# Patient Record
Sex: FEMALE | Race: WHITE | NOT HISPANIC OR LATINO | Employment: OTHER | URBAN - METROPOLITAN AREA
[De-identification: names, ages, dates, MRNs, and addresses within clinical notes are randomized per-mention and may not be internally consistent; named-entity substitution may affect disease eponyms.]

---

## 2017-01-18 PROBLEM — D23.0 OTHER BENIGN NEOPLASM OF SKIN OF LIP: Status: ACTIVE | Noted: 2017-01-18

## 2017-01-18 PROBLEM — D49.2 NEOPLASM OF UNSPECIFIED BEHAVIOR OF BONE, SOFT TISSUE, AND SKIN: Status: ACTIVE | Noted: 2017-01-18

## 2017-05-23 ENCOUNTER — PATIENT OUTREACH (OUTPATIENT)
Dept: HEALTH INFORMATION MANAGEMENT | Facility: OTHER | Age: 77
End: 2017-05-23

## 2017-05-23 NOTE — PROGRESS NOTES
Outcome: REQUESTED A CALL BACK -- PATIENT DID NOT HAVE HER CALENDER HANDY AND WILL CALL BACK. OK WITH US CALLING BACK AT A LATER TIME AS WELL. I ALSO GAVE HER A LIST OF PROVIDERS THAT ARE CURRENTLY ACCEPTING PATIENTS IN [facility] AND [facility].    WebIZ Checked & Epic Updated:  yes    HealthConnect Verified: no    Attempt # 1ST

## 2017-06-13 ENCOUNTER — HOSPITAL ENCOUNTER (EMERGENCY)
Facility: MEDICAL CENTER | Age: 77
End: 2017-06-13
Attending: EMERGENCY MEDICINE
Payer: MEDICARE

## 2017-06-13 ENCOUNTER — APPOINTMENT (OUTPATIENT)
Dept: RADIOLOGY | Facility: MEDICAL CENTER | Age: 77
End: 2017-06-13
Attending: EMERGENCY MEDICINE
Payer: MEDICARE

## 2017-06-13 VITALS
HEART RATE: 57 BPM | BODY MASS INDEX: 19.99 KG/M2 | RESPIRATION RATE: 16 BRPM | WEIGHT: 117.06 LBS | TEMPERATURE: 97.9 F | HEIGHT: 64 IN | DIASTOLIC BLOOD PRESSURE: 66 MMHG | OXYGEN SATURATION: 97 % | SYSTOLIC BLOOD PRESSURE: 151 MMHG

## 2017-06-13 DIAGNOSIS — R11.0 NAUSEA: ICD-10-CM

## 2017-06-13 DIAGNOSIS — R10.9 FLANK PAIN: ICD-10-CM

## 2017-06-13 LAB
ALBUMIN SERPL BCP-MCNC: 4 G/DL (ref 3.2–4.9)
ALBUMIN/GLOB SERPL: 1.5 G/DL
ALP SERPL-CCNC: 60 U/L (ref 30–99)
ALT SERPL-CCNC: 10 U/L (ref 2–50)
ANION GAP SERPL CALC-SCNC: 7 MMOL/L (ref 0–11.9)
APPEARANCE UR: CLEAR
AST SERPL-CCNC: 17 U/L (ref 12–45)
BASOPHILS # BLD AUTO: 1.2 % (ref 0–1.8)
BASOPHILS # BLD: 0.04 K/UL (ref 0–0.12)
BILIRUB SERPL-MCNC: 0.6 MG/DL (ref 0.1–1.5)
BILIRUB UR QL STRIP.AUTO: NEGATIVE
BUN SERPL-MCNC: 24 MG/DL (ref 8–22)
CALCIUM SERPL-MCNC: 9.2 MG/DL (ref 8.5–10.5)
CHLORIDE SERPL-SCNC: 104 MMOL/L (ref 96–112)
CO2 SERPL-SCNC: 22 MMOL/L (ref 20–33)
COLOR UR: NORMAL
CREAT SERPL-MCNC: 0.78 MG/DL (ref 0.5–1.4)
CULTURE IF INDICATED INDCX: NO UA CULTURE
EOSINOPHIL # BLD AUTO: 0.19 K/UL (ref 0–0.51)
EOSINOPHIL NFR BLD: 5.7 % (ref 0–6.9)
ERYTHROCYTE [DISTWIDTH] IN BLOOD BY AUTOMATED COUNT: 42 FL (ref 35.9–50)
GFR SERPL CREATININE-BSD FRML MDRD: >60 ML/MIN/1.73 M 2
GLOBULIN SER CALC-MCNC: 2.6 G/DL (ref 1.9–3.5)
GLUCOSE SERPL-MCNC: 67 MG/DL (ref 65–99)
GLUCOSE UR STRIP.AUTO-MCNC: NEGATIVE MG/DL
HCT VFR BLD AUTO: 43 % (ref 37–47)
HGB BLD-MCNC: 14.3 G/DL (ref 12–16)
IMM GRANULOCYTES # BLD AUTO: 0.01 K/UL (ref 0–0.11)
IMM GRANULOCYTES NFR BLD AUTO: 0.3 % (ref 0–0.9)
KETONES UR STRIP.AUTO-MCNC: NEGATIVE MG/DL
LEUKOCYTE ESTERASE UR QL STRIP.AUTO: NEGATIVE
LIPASE SERPL-CCNC: 53 U/L (ref 11–82)
LYMPHOCYTES # BLD AUTO: 1.18 K/UL (ref 1–4.8)
LYMPHOCYTES NFR BLD: 35.6 % (ref 22–41)
MCH RBC QN AUTO: 31 PG (ref 27–33)
MCHC RBC AUTO-ENTMCNC: 33.3 G/DL (ref 33.6–35)
MCV RBC AUTO: 93.3 FL (ref 81.4–97.8)
MICRO URNS: NORMAL
MONOCYTES # BLD AUTO: 0.29 K/UL (ref 0–0.85)
MONOCYTES NFR BLD AUTO: 8.8 % (ref 0–13.4)
NEUTROPHILS # BLD AUTO: 1.6 K/UL (ref 2–7.15)
NEUTROPHILS NFR BLD: 48.4 % (ref 44–72)
NITRITE UR QL STRIP.AUTO: NEGATIVE
NRBC # BLD AUTO: 0 K/UL
NRBC BLD AUTO-RTO: 0 /100 WBC
PH UR STRIP.AUTO: 5.5 [PH]
PLATELET # BLD AUTO: 190 K/UL (ref 164–446)
PMV BLD AUTO: 9.2 FL (ref 9–12.9)
POTASSIUM SERPL-SCNC: 3.6 MMOL/L (ref 3.6–5.5)
PROT SERPL-MCNC: 6.6 G/DL (ref 6–8.2)
PROT UR QL STRIP: NEGATIVE MG/DL
RBC # BLD AUTO: 4.61 M/UL (ref 4.2–5.4)
RBC UR QL AUTO: NEGATIVE
SODIUM SERPL-SCNC: 133 MMOL/L (ref 135–145)
SP GR UR STRIP.AUTO: 1.01
WBC # BLD AUTO: 3.3 K/UL (ref 4.8–10.8)

## 2017-06-13 PROCEDURE — 36415 COLL VENOUS BLD VENIPUNCTURE: CPT

## 2017-06-13 PROCEDURE — 99284 EMERGENCY DEPT VISIT MOD MDM: CPT

## 2017-06-13 PROCEDURE — 700111 HCHG RX REV CODE 636 W/ 250 OVERRIDE (IP): Performed by: EMERGENCY MEDICINE

## 2017-06-13 PROCEDURE — 700117 HCHG RX CONTRAST REV CODE 255: Performed by: EMERGENCY MEDICINE

## 2017-06-13 PROCEDURE — 96375 TX/PRO/DX INJ NEW DRUG ADDON: CPT

## 2017-06-13 PROCEDURE — 85025 COMPLETE CBC W/AUTO DIFF WBC: CPT

## 2017-06-13 PROCEDURE — 80053 COMPREHEN METABOLIC PANEL: CPT

## 2017-06-13 PROCEDURE — 83690 ASSAY OF LIPASE: CPT

## 2017-06-13 PROCEDURE — 74177 CT ABD & PELVIS W/CONTRAST: CPT

## 2017-06-13 PROCEDURE — 81003 URINALYSIS AUTO W/O SCOPE: CPT

## 2017-06-13 PROCEDURE — 96374 THER/PROPH/DIAG INJ IV PUSH: CPT

## 2017-06-13 RX ORDER — ONDANSETRON 2 MG/ML
4 INJECTION INTRAMUSCULAR; INTRAVENOUS ONCE
Status: COMPLETED | OUTPATIENT
Start: 2017-06-13 | End: 2017-06-13

## 2017-06-13 RX ORDER — HYDROCODONE BITARTRATE AND ACETAMINOPHEN 5; 325 MG/1; MG/1
.5-1 TABLET ORAL EVERY 6 HOURS PRN
Qty: 10 TAB | Refills: 0 | Status: SHIPPED | OUTPATIENT
Start: 2017-06-13 | End: 2017-06-29

## 2017-06-13 RX ORDER — ONDANSETRON 4 MG/1
4 TABLET, ORALLY DISINTEGRATING ORAL EVERY 8 HOURS PRN
Qty: 20 TAB | Refills: 0 | Status: SHIPPED | OUTPATIENT
Start: 2017-06-13 | End: 2017-06-29

## 2017-06-13 RX ADMIN — HYDROMORPHONE HYDROCHLORIDE 1 MG: 1 INJECTION, SOLUTION INTRAMUSCULAR; INTRAVENOUS; SUBCUTANEOUS at 13:12

## 2017-06-13 RX ADMIN — IOHEXOL 100 ML: 350 INJECTION, SOLUTION INTRAVENOUS at 13:28

## 2017-06-13 RX ADMIN — ONDANSETRON 4 MG: 2 INJECTION INTRAMUSCULAR; INTRAVENOUS at 13:12

## 2017-06-13 ASSESSMENT — PAIN SCALES - GENERAL
PAINLEVEL_OUTOF10: 3
PAINLEVEL_OUTOF10: 7

## 2017-06-13 NOTE — ED AVS SNAPSHOT
Home Care Instructions                                                                                                                Mariama Gonzales   MRN: [id number]    Department:  [facility], Emergency Dept   Date of Visit:  6/13/2017            [facility], Emergency Dept    [address] [place] [id number]    Phone:  [phone]      You were seen by     Amarjit Khanna M.D.      Your Diagnosis Was     Flank pain     R10.9       These are the medications you received during your hospitalization from 06/13/2017 1126 to 06/13/2017 1456     Date/Time Order Dose Route Action    06/13/2017 1312 HYDROmorphone (DILAUDID) injection 0.5-1 mg 1 mg Intravenous Given    06/13/2017 1312 ondansetron (ZOFRAN) syringe/vial injection 4 mg 4 mg Intravenous Given    06/13/2017 1328 iohexol (OMNIPAQUE) 350 mg/mL 100 mL Intravenous Given      Follow-up Information     1. Follow up with [facility], Emergency Dept.    Specialty:  Emergency Medicine    Why:  If symptoms worsen    Contact information    [address] [place] [id number] [phone]        2. Follow up with Danielito Corona M.D..    Specialty:  Family Medicine    Contact information    [address] [facility] [address]  [phone]        Medication Information     Review all of your home medications and newly ordered medications with your primary doctor and/or pharmacist as soon as possible. Follow medication instructions as directed by your doctor and/or pharmacist.     Please keep your complete medication list with you and share with your physician. Update the information when medications are discontinued, doses are changed, or new medications (including over-the-counter products) are added; and carry medication information at all times in the event of emergency situations.               Medication List      START taking these medications        Instructions    Morning Afternoon Evening  Bedtime    hydrocodone-acetaminophen 5-325 MG Tabs per tablet   Commonly known as:  NORCO        Take 0.5-1 Tabs by mouth every 6 hours as needed.   Dose:  0.5-1 Tab                        ondansetron 4 MG Tbdp   Commonly known as:  ZOFRAN ODT        Take 1 Tab by mouth every 8 hours as needed for Nausea/Vomiting.   Dose:  4 mg                          ASK your doctor about these medications        Instructions    Morning Afternoon Evening Bedtime    amoxicillin-clavulanate 875-125 MG Tabs   Commonly known as:  AUGMENTIN        Take 1 Tab by mouth 2 times a day.   Dose:  1 Tab                        aspirin  MG Tbec   Commonly known as:  ECOTRIN        Take 1 Tab by mouth every day.   Dose:  325 mg                        BIOTIN 5000 PO        Take  by mouth every day.                        CALCIUM MAGNESIUM PO        Take  by mouth.                        doxycycline 100 MG Tabs   Commonly known as:  VIBRAMYCIN        Take 1 Tab by mouth 2 times a day.   Dose:  100 mg                        FOLIC ACID PO        Take  by mouth every day.                        GLUCOSAMINE-CHONDROITIN PO        Take  by mouth.                        LEVSIN PO        Take  by mouth as needed.                        MULTIVITAMIN PO        Take  by mouth every day.                        NAPROSYN 375 MG Tabs   Generic drug:  naproxen        Take 375 mg by mouth 2 times a day, with meals.   Dose:  375 mg                        PEPCID PO        Take  by mouth.                        ranitidine 150 MG Tabs   Commonly known as:  ZANTAC        Take 150 mg by mouth 2 times a day.   Dose:  150 mg                        SIMVASTATIN PO        Take 10 mg by mouth every evening.   Dose:  10 mg                        Turmeric Curcumin Caps        Take  by mouth.                        * VALIUM PO        Take 5 mg by mouth as needed.   Dose:  5 mg                        * diazepam 10 MG tablet   Commonly known as:  VALIUM        Take 1 Tab by  mouth every 12 hours as needed for Anxiety.   Dose:  10 mg                        VITAMIN D3 PO        Take 50,000 mg by mouth every 7 days.   Dose:  25392 mg                        * Notice:  This list has 2 medication(s) that are the same as other medications prescribed for you. Read the directions carefully, and ask your doctor or other care provider to review them with you.         Where to Get Your Medications      These medications were sent to [id number] [place] - [address]  [address] [place] [address]     Phone:  [phone]    - ondansetron 4 MG Tbdp      You can get these medications from any pharmacy     Bring a paper prescription for each of these medications    - hydrocodone-acetaminophen 5-325 MG Tabs per tablet            Procedures and tests performed during your visit     CARDIAC MONITORING    CBC WITH DIFFERENTIAL    COMP METABOLIC PANEL    CONSENT FOR CONTRAST INJECTION    CT-ABDOMEN-PELVIS WITH    ESTIMATED GFR    LIPASE    O2 Protocol    SALINE LOCK    URINALYSIS,CULTURE IF INDICATED        Discharge Instructions       Please follow-up with your primary care provider for blood pressure management.    Back Pain, Adult  Back pain is very common in adults. The cause of back pain is rarely dangerous and the pain often gets better over time. The cause of your back pain may not be known. Some common causes of back pain include:  · Strain of the muscles or ligaments supporting the spine.  · Wear and tear (degeneration) of the spinal disks.  · Arthritis.  · Direct injury to the back.  For many people, back pain may return. Since back pain is rarely dangerous, most people can learn to manage this condition on their own.  HOME CARE INSTRUCTIONS  Watch your back pain for any changes. The following actions may help to lessen any discomfort you are feeling:  · Remain active. It is stressful on your back to sit or  one place for long periods of time. Do not sit, drive, or stand  in one place for more than 30 minutes at a time. Take short walks on even surfaces as soon as you are able. Try to increase the length of time you walk each day.  · Exercise regularly as directed by your health care provider. Exercise helps your back heal faster. It also helps avoid future injury by keeping your muscles strong and flexible.  · Do not stay in bed. Resting more than 1-2 days can delay your recovery.  · Pay attention to your body when you bend and lift. The most comfortable positions are those that put less stress on your recovering back. Always use proper lifting techniques, including:  ¨ Bending your knees.  ¨ Keeping the load close to your body.  ¨ Avoiding twisting.  · Find a comfortable position to sleep. Use a firm mattress and lie on your side with your knees slightly bent. If you lie on your back, put a pillow under your knees.  · Avoid feeling anxious or stressed. Stress increases muscle tension and can worsen back pain. It is important to recognize when you are anxious or stressed and learn ways to manage it, such as with exercise.  · Take medicines only as directed by your health care provider. Over-the-counter medicines to reduce pain and inflammation are often the most helpful. Your health care provider may prescribe muscle relaxant drugs. These medicines help dull your pain so you can more quickly return to your normal activities and healthy exercise.  · Apply ice to the injured area:  ¨ Put ice in a plastic bag.  ¨ Place a towel between your skin and the bag.  ¨ Leave the ice on for 20 minutes, 2-3 times a day for the first 2-3 days. After that, ice and heat may be alternated to reduce pain and spasms.  · Maintain a healthy weight. Excess weight puts extra stress on your back and makes it difficult to maintain good posture.  SEEK MEDICAL CARE IF:  · You have pain that is not relieved with rest or medicine.  · You have increasing pain going down into the legs or buttocks.  · You have pain  that does not improve in one week.  · You have night pain.  · You lose weight.  · You have a fever or chills.  SEEK IMMEDIATE MEDICAL CARE IF:   · You develop new bowel or bladder control problems.  · You have unusual weakness or numbness in your arms or legs.  · You develop nausea or vomiting.  · You develop abdominal pain.  · You feel faint.     This information is not intended to replace advice given to you by your health care provider. Make sure you discuss any questions you have with your health care provider.     Document Released: 12/18/2006 Document Revised: 01/08/2016 Document Reviewed: 04/21/2015  [email or web address] Interactive Patient Education ©2016 [email or web address]            Patient Information     Patient Information    Following emergency treatment: all patient requiring follow-up care must return either to a private physician or a clinic if your condition worsens before you are able to obtain further medical attention, please return to the emergency room.     Billing Information    At [facility], we work to make the billing process streamlined for our patients.  Our Representatives are here to answer any questions you may have regarding your hospital bill.  If you have insurance coverage and have supplied your insurance information to us, we will submit a claim to your insurer on your behalf.  Should you have any questions regarding your bill, we can be reached online or by phone as follows:  Online: You are able pay your bills online or live chat with our representatives about any billing questions you may have. We are here to help Monday - Friday from 8:00am to 7:30pm and 9:00am - 12:00pm on Saturdays.  Please visit [email or web address]  for more information.   Phone:  [phone] or [phone]    Please note that your emergency physician, surgeon, pathologist, radiologist, anesthesiologist, and other specialists are not employed by [facility] and will therefore bill  separately for their services.  Please contact them directly for any questions concerning their bills at the numbers below:     Emergency Physician Services:  [phone]  [facility]:  [phone]  [facility]:  [phone]  [facility]:  [phone]    1. Your final bill may vary from the amount quoted upon discharge if all procedures are not complete at that time, or if your doctor has additional procedures of which we are not aware. You will receive an additional bill if you return to the Emergency Department at [facility] for suture removal regardless of the facility of which the sutures were placed.     2. Please arrange for settlement of this account at the emergency registration.    3. All self-pay accounts are due in full at the time of treatment.  If you are unable to meet this obligation then payment is expected within 4-5 days.     4. If you have had radiology studies (CT, X-ray, Ultrasound, MRI), you have received a preliminary result during your emergency department visit. Please contact the radiology department [phone] to receive a copy of your final result. Please discuss the Final result with your primary physician or with the follow up physician provided.     Crisis Hotline:  [place] Crisis Hotline:  4-262-CEHGBBK or 1-498.768.5577  [place] Crisis Hotline:    1-565.527.6610 or 875-036-4611         ED Discharge Follow Up Questions    1. In order to provide you with very good care, we would like to follow up with a phone call in the next few days.  May we have your permission to contact you?     YES /  NO    2. What is the best phone number to call you? (       )_____-__________    3. What is the best time to call you?      Morning  /  Afternoon  /  Evening                   Patient Signature:  ____________________________________________________________    Date:  ____________________________________________________________

## 2017-06-13 NOTE — ED AVS SNAPSHOT
6/13/2017    Mariama Gonzales  [address] [place] [address]    Dear Mariama:    [facility] wants to ensure your discharge home is safe and you or your loved ones have had all of your questions answered regarding your care after you leave the hospital.    Below is a list of resources and contact information should you have any questions regarding your hospital stay, follow-up instructions, or active medical symptoms.    Questions or Concerns Regarding… Contact   Medical Questions Related to Your Discharge  (7 days a week, 8am-5pm) Contact a Nurse Care Coordinator   [phone]   Medical Questions Not Related to Your Discharge  (24 hours a day / 7 days a week)  Contact the Nurse Health Line   [phone]    Medications or Discharge Instructions Refer to your discharge packet   or contact your [facility] Primary Care Provider   [phone]   Follow-up Appointment(s) Schedule your appointment via [facility]   or contact Scheduling [phone]   Billing Review your statement via [facility]  or contact Billing [phone]   Medical Records Review your records via [facility]   or contact Medical Records [phone]     You may receive a telephone call within two days of discharge. This call is to make certain you understand your discharge instructions and have the opportunity to have any questions answered. You can also easily access your medical information, test results and upcoming appointments via the [facility] free online health management tool. You can learn more and sign up at [employer]. For assistance setting up your [facility] account, please call [phone].    Once again, we want to ensure your discharge home is safe and that you have a clear understanding of any next steps in your care. If you have any questions or concerns, please do not hesitate to contact us, we are here for you. Thank you for choosing [facility] for your healthcare needs.    Sincerely,    Your [facility] Healthcare Team

## 2017-06-13 NOTE — ED AVS SNAPSHOT
Opinews[employer] Access Code: [email or web address]  Expires: 7/13/2017 12:46 PM    OpinewsTV  A secure, online tool to manage your health information     [employer]’s OpinewsTV® is a secure, online tool that connects you to your personalized health information from the privacy of your home -- day or night - making it very easy for you to manage your healthcare. Once the activation process is completed, you can even access your medical information using the OpinewsTV christian, which is available for free in the Apple Christian store or Google Play store.     OpinewsTV provides the following levels of access (as shown below):   My Chart Features   [facility] Primary Care Doctor [facility]  Specialists [facility]  Urgent  Care Non-[facility]  Primary Care  Doctor   Email your healthcare team securely and privately 24/7 X X X X   Manage appointments: schedule your next appointment; view details of past/upcoming appointments X      Request prescription refills. X      View recent personal medical records, including lab and immunizations X X X X   View health record, including health history, allergies, medications X X X X   Read reports about your outpatient visits, procedures, consult and ER notes X X X X   See your discharge summary, which is a recap of your hospital and/or ER visit that includes your diagnosis, lab results, and care plan. X X       How to register for OpinewsTV:  1. Go to  [email or web address].  2. Click on the Sign Up Now box, which takes you to the New Member Sign Up page. You will need to provide the following information:  a. Enter your Opine[employer] Access Code exactly as it appears at the top of this page. (You will not need to use this code after you’ve completed the sign-up process. If you do not sign up before the expiration date, you must request a new code.)   b. Enter your date of birth.   c. Enter your home email address.   d. Click Submit, and follow the next screen’s instructions.  3. Create a OpinewsTV ID. This will be your OpinewsTV  login ID and cannot be changed, so think of one that is secure and easy to remember.  4. Create a [employer] [place] password. You can change your password at any time.  5. Enter your Password Reset Question and Answer. This can be used at a later time if you forget your password.   6. Enter your e-mail address. This allows you to receive e-mail notifications when new information is available in [employer] [place].  7. Click Sign Up. You can now view your health information.    For assistance activating your [employer] [place] account, call [phone]

## 2017-06-13 NOTE — ED NOTES
Reviewed all discharge instructions with patient, Reviewed all prescriptions, discussed no driving or ETOH on narcotics, Pt denies questions. Pt escorted to lobby with daughter.

## 2017-06-13 NOTE — ED NOTES
"Chief Complaint   Patient presents with   • Flank Pain     pt recently diagnosed with UTI, reports that is has gotten worse and was sent for CT of right kidney.     Blood pressure 151/66, pulse 69, temperature 36.6 °C (97.9 °F), temperature source Temporal, resp. rate 16, height 1.626 m (5' 4\"), weight 53.1 kg (117 lb 1 oz), SpO2 99 %.  Pt informed of wait times. Educated on triage process.  Asked to return to triage RN for any new or worsening of symptoms. Thanked for patience.        "

## 2017-06-13 NOTE — DISCHARGE INSTRUCTIONS
Please follow-up with your primary care provider for blood pressure management.    Back Pain, Adult  Back pain is very common in adults. The cause of back pain is rarely dangerous and the pain often gets better over time. The cause of your back pain may not be known. Some common causes of back pain include:  · Strain of the muscles or ligaments supporting the spine.  · Wear and tear (degeneration) of the spinal disks.  · Arthritis.  · Direct injury to the back.  For many people, back pain may return. Since back pain is rarely dangerous, most people can learn to manage this condition on their own.  HOME CARE INSTRUCTIONS  Watch your back pain for any changes. The following actions may help to lessen any discomfort you are feeling:  · Remain active. It is stressful on your back to sit or  one place for long periods of time. Do not sit, drive, or  one place for more than 30 minutes at a time. Take short walks on even surfaces as soon as you are able. Try to increase the length of time you walk each day.  · Exercise regularly as directed by your health care provider. Exercise helps your back heal faster. It also helps avoid future injury by keeping your muscles strong and flexible.  · Do not stay in bed. Resting more than 1-2 days can delay your recovery.  · Pay attention to your body when you bend and lift. The most comfortable positions are those that put less stress on your recovering back. Always use proper lifting techniques, including:  ¨ Bending your knees.  ¨ Keeping the load close to your body.  ¨ Avoiding twisting.  · Find a comfortable position to sleep. Use a firm mattress and lie on your side with your knees slightly bent. If you lie on your back, put a pillow under your knees.  · Avoid feeling anxious or stressed. Stress increases muscle tension and can worsen back pain. It is important to recognize when you are anxious or stressed and learn ways to manage it, such as with exercise.  · Take  medicines only as directed by your health care provider. Over-the-counter medicines to reduce pain and inflammation are often the most helpful. Your health care provider may prescribe muscle relaxant drugs. These medicines help dull your pain so you can more quickly return to your normal activities and healthy exercise.  · Apply ice to the injured area:  ¨ Put ice in a plastic bag.  ¨ Place a towel between your skin and the bag.  ¨ Leave the ice on for 20 minutes, 2-3 times a day for the first 2-3 days. After that, ice and heat may be alternated to reduce pain and spasms.  · Maintain a healthy weight. Excess weight puts extra stress on your back and makes it difficult to maintain good posture.  SEEK MEDICAL CARE IF:  · You have pain that is not relieved with rest or medicine.  · You have increasing pain going down into the legs or buttocks.  · You have pain that does not improve in one week.  · You have night pain.  · You lose weight.  · You have a fever or chills.  SEEK IMMEDIATE MEDICAL CARE IF:   · You develop new bowel or bladder control problems.  · You have unusual weakness or numbness in your arms or legs.  · You develop nausea or vomiting.  · You develop abdominal pain.  · You feel faint.     This information is not intended to replace advice given to you by your health care provider. Make sure you discuss any questions you have with your health care provider.     Document Released: 12/18/2006 Document Revised: 01/08/2016 Document Reviewed: 04/21/2015  [employer] Interactive Patient Education ©2016 [employer]

## 2017-06-13 NOTE — ED PROVIDER NOTES
ED Provider Note    Scribed for Amarjit Khanna M.D. by Anam Carr. 6/13/2017  12:24 PM    Primary care provider: Danielito Corona M.D.  Means of arrival: Walk in  History obtained from: Patient  History limited by: None    CHIEF COMPLAINT  Chief Complaint   Patient presents with   • Flank Pain     pt recently diagnosed with UTI, reports that is has gotten worse and was sent for CT of right kidney.       CHANTAL Gonzales is a 76 y.o. female who presents to the Emergency Department complaining of right sided flank pain onset 2 weeks ago. Patient states she initially believed the pain was due to her sciatica. She reports the pain has been gradually worsening. The pain radiated to her stomach area recently, prompting her to visit Urgent Care. After doing a culture at Urgent Care, she was put on macrobid. Patient completed taking the macrobid 7 days ago with no relief. She denies exacerbation of the pain when sitting up. Patient reports associated chills, nausea, vomiting, and back pain. She denies dysuria or fevers. Patient denies any past abdominal surgeries.    REVIEW OF SYSTEMS  Pertinent positives include right sided flank pain, abdominal pain, chills, nausea, vomiting, back pain. Pertinent negatives include no dysuria, fevers.  All other systems reviewed and negative.  C    PAST MEDICAL HISTORY   has a past medical history of Lupus; Sjogren's syndrome; Fibromyalgia; Lichen planus; GERD (gastroesophageal reflux disease); Mitral valve prolapse; Arthritis; Kidney calculi; GERD (gastroesophageal reflux disease); Dental disorder; Snoring; CATARACT; Pain; Psychiatric problem; Shingles (10/4/11); Other convulsions; Allergy, unspecified not elsewhere classified; Anxiety; Unspecified asthma(493.90); Blood transfusion, without reported diagnosis; Depression; Headache(784.0); Headache, classical migraine; Osteoporosis, unspecified; Urinary tract infection, site not specified; and Migraine without aura, without  mention of intractable migraine without mention of status migrainosus.    SURGICAL HISTORY   has past surgical history that includes mastectomy (1972); mammoplasty augmentation (1988); other (2007); breast biopsy (1970's); other (1988); shoulder decompression arthroscopic (10/6/2011); clavicle distal excision (10/6/2011); closed reduction (8/6/2012); hip cannulated screw (8/6/2012); hip cannulated screw (1/28/2014); abdominal exploration; eye surgery; and open reduction.    SOCIAL HISTORY  Social History   Substance Use Topics   • Smoking status: Former Smoker -- 34 years     Quit date: 01/28/1978   • Smokeless tobacco: Former User     Quit date: 05/27/1978   • Alcohol Use: Yes      Comment: rarely      History   Drug Use No       FAMILY HISTORY  Family History   Problem Relation Age of Onset   • Arthritis Mother    • Cancer Father    • Heart Disease Father    • Hypertension Father    • Diabetes Maternal Aunt    • Cancer Maternal Uncle    • Heart Disease Maternal Grandmother    • Diabetes Maternal Grandfather    • Genetic Paternal Grandmother    • Heart Disease Paternal Grandfather    • Hyperlipidemia Paternal Grandfather    • Diabetes Maternal Aunt    • Genetic Maternal Uncle    • Cancer Paternal Aunt    • Cancer Paternal Aunt    • Cancer Paternal Uncle        CURRENT MEDICATIONS  No current facility-administered medications on file prior to encounter.     Current Outpatient Prescriptions on File Prior to Encounter   Medication Sig Dispense Refill   • amoxicillin-clavulanate (AUGMENTIN) 875-125 MG Tab Take 1 Tab by mouth 2 times a day. 14 Tab 0   • doxycycline (VIBRAMYCIN) 100 MG Tab Take 1 Tab by mouth 2 times a day. 20 Tab 0   • aspirin EC (ECOTRIN) 325 MG Tablet Delayed Response Take 1 Tab by mouth every day. 40 Tab 0   • diazepam (VALIUM) 10 MG tablet Take 1 Tab by mouth every 12 hours as needed for Anxiety. 15 Tab 0   • Famotidine (PEPCID PO) Take  by mouth.     • GLUCOSAMINE-CHONDROITIN PO Take  by mouth.    "  • Calcium-Magnesium-Vitamin D (CALCIUM MAGNESIUM PO) Take  by mouth.     • FOLIC ACID PO Take  by mouth every day.     • ranitidine (ZANTAC) 150 MG TABS Take 150 mg by mouth 2 times a day.     • Hyoscyamine Sulfate (LEVSIN PO) Take  by mouth as needed.     • Diazepam (VALIUM PO) Take 5 mg by mouth as needed.     • SIMVASTATIN PO Take 10 mg by mouth every evening.     • BIOTIN 5000 PO Take  by mouth every day.     • Cholecalciferol (VITAMIN D3 PO) Take 50,000 mg by mouth every 7 days.     • Multiple Vitamin (MULTIVITAMIN PO) Take  by mouth every day.     • naproxen (NAPROSYN) 375 MG TABS Take 375 mg by mouth 2 times a day, with meals.     • Misc Natural Products (TURMERIC CURCUMIN) CAPS Take  by mouth.        ALLERGIES  Allergies   Allergen Reactions   • Ciprofloxacin    • Other Drug      \"tumor necrosis factor drugs\" infections   • Sulfa Drugs Hives       PHYSICAL EXAM  VITAL SIGNS: /66 mmHg  Pulse 69  Temp(Src) 36.6 °C (97.9 °F) (Temporal)  Resp 16  Ht 1.626 m (5' 4\")  Wt 53.1 kg (117 lb 1 oz)  BMI 20.08 kg/m2  SpO2 99%  Constitutional: Well developed, Well nourished, mild distress, Non-toxic appearance.   HENT: Normocephalic, Atraumatic, Bilateral external ears normal, Oropharynx moist, No oral exudates.   Eyes: PERRLA, EOMI, Conjunctiva normal, No discharge.   Neck: No tenderness, Supple, No stridor.   Lymphatic: No lymphadenopathy noted.   Cardiovascular: Normal heart rate, Normal rhythm.   Thorax & Lungs: Clear to auscultation bilaterally, No respiratory distress, No wheezing, No crackles.   Abdomen: Soft, Slight right-sided abdominal tenderness, lower greater than upper. No masses, No pulsatile masses. Right CVA tenderness.  Skin: Warm, Dry, No erythema, No rash.   Extremities:, No edema No cyanosis.   Musculoskeletal: No tenderness to palpation or major deformities noted.  Intact distal pulses  Neurologic: Awake, alert. Moves all extremities spontaneously.  Psychiatric: Affect normal, Judgment " normal. Slightly anxious.        LABS  Results for orders placed or performed during the hospital encounter of 06/13/17   CBC WITH DIFFERENTIAL   Result Value Ref Range    WBC 3.3 (L) 4.8 - 10.8 K/uL    RBC 4.61 4.20 - 5.40 M/uL    Hemoglobin 14.3 12.0 - 16.0 g/dL    Hematocrit 43.0 37.0 - 47.0 %    MCV 93.3 81.4 - 97.8 fL    MCH 31.0 27.0 - 33.0 pg    MCHC 33.3 (L) 33.6 - 35.0 g/dL    RDW 42.0 35.9 - 50.0 fL    Platelet Count 190 164 - 446 K/uL    MPV 9.2 9.0 - 12.9 fL    Neutrophils-Polys 48.40 44.00 - 72.00 %    Lymphocytes 35.60 22.00 - 41.00 %    Monocytes 8.80 0.00 - 13.40 %    Eosinophils 5.70 0.00 - 6.90 %    Basophils 1.20 0.00 - 1.80 %    Immature Granulocytes 0.30 0.00 - 0.90 %    Nucleated RBC 0.00 /100 WBC    Neutrophils (Absolute) 1.60 (L) 2.00 - 7.15 K/uL    Lymphs (Absolute) 1.18 1.00 - 4.80 K/uL    Monos (Absolute) 0.29 0.00 - 0.85 K/uL    Eos (Absolute) 0.19 0.00 - 0.51 K/uL    Baso (Absolute) 0.04 0.00 - 0.12 K/uL    Immature Granulocytes (abs) 0.01 0.00 - 0.11 K/uL    NRBC (Absolute) 0.00 K/uL   COMP METABOLIC PANEL   Result Value Ref Range    Sodium 133 (L) 135 - 145 mmol/L    Potassium 3.6 3.6 - 5.5 mmol/L    Chloride 104 96 - 112 mmol/L    Co2 22 20 - 33 mmol/L    Anion Gap 7.0 0.0 - 11.9    Glucose 67 65 - 99 mg/dL    Bun 24 (H) 8 - 22 mg/dL    Creatinine 0.78 0.50 - 1.40 mg/dL    Calcium 9.2 8.5 - 10.5 mg/dL    AST(SGOT) 17 12 - 45 U/L    ALT(SGPT) 10 2 - 50 U/L    Alkaline Phosphatase 60 30 - 99 U/L    Total Bilirubin 0.6 0.1 - 1.5 mg/dL    Albumin 4.0 3.2 - 4.9 g/dL    Total Protein 6.6 6.0 - 8.2 g/dL    Globulin 2.6 1.9 - 3.5 g/dL    A-G Ratio 1.5 g/dL   LIPASE   Result Value Ref Range    Lipase 53 11 - 82 U/L   URINALYSIS,CULTURE IF INDICATED   Result Value Ref Range    Color Lt. Yellow     Character Clear     Specific Gravity 1.010 <1.035    Ph 5.5 5.0-8.0    Glucose Negative Negative mg/dL    Ketones Negative Negative mg/dL    Protein Negative Negative mg/dL    Bilirubin Negative  Negative    Nitrite Negative Negative    Leukocyte Esterase Negative Negative    Occult Blood Negative Negative    Micro Urine Req see below     Culture Indicated No UA Culture   ESTIMATED GFR   Result Value Ref Range    GFR If African American >60 >60 mL/min/1.73 m 2    GFR If Non African American >60 >60 mL/min/1.73 m 2    All labs reviewed by me.    RADIOLOGY  CT-ABDOMEN-PELVIS WITH   Final Result      1.  Mild left pelvocaliectasis. Nonspecific finding but could be related to infection.   2.  No right hydronephrosis.   3.  Gallbladder is not well demonstrated.   4.  Diverticula of the colon. No evidence diverticulitis. No free fluid.   5.  The appendix is not delineated.              COURSE & MEDICAL DECISION MAKING  Pertinent Labs & Imaging studies reviewed. (See chart for details)    I reviewed the patient's medical records which showed no prior records.    12:24 PM - Patient seen and examined at bedside. Ordered estimated GFR, CBC, CMP, lipase, urinalysis culture to evaluate her symptoms. The differential diagnoses include but are not limited to: pyelonephritis, appendicitis, gallbladder, kidney stone    2:46 PM Patient reevaluated at bedside. Discussed lab and radiology results as seen above which show no infection or stones. Advised using heat compresses for the pain and to follow up with PCP.     2:49 PM The patient will be discharged with Norco and Zofran ODT and should return if symptoms worsen or if new symptoms arise. The patient understands and agrees to plan.      Decision Making:  Patient with right-sided back pain since she was recently diagnosed with urinary tract infection, workup here shows a normal CT scan along with blood tests and urine, I believe the patient's back pain is likely muscle skeletal nature, we'll get the patient perception for some pain medicines, she'll follow up with her primary care physician, follow-up with physical therapy or chiropractic, use heat, return with worsening  symptoms.    I reviewed prescription monitoring program for patient's narcotic use before prescribing a scheduled drug.The patient will not drink alcohol nor drive with prescribed medications. The patient will return for new or worsening symptoms and is stable at the time of discharge.    The patient is referred to a primary physician for blood pressure management, diabetic screening, and for all other preventative health concerns.    DISPOSITION:  Patient will be discharged home in stable condition.    FOLLOW UP:  [facility], Emergency Dept  [address] [facility] [id number] [phone]    If symptoms worsen    Danielito Corona M.D.  [address] [facility] [address]  [phone]            OUTPATIENT MEDICATIONS:  Discharge Medication List as of 6/13/2017  2:56 PM      START taking these medications    Details   hydrocodone-acetaminophen (NORCO) 5-325 MG Tab per tablet Take 0.5-1 Tabs by mouth every 6 hours as needed., Disp-10 Tab, R-0, Print Rx Paper      ondansetron (ZOFRAN ODT) 4 MG TABLET DISPERSIBLE Take 1 Tab by mouth every 8 hours as needed for Nausea/Vomiting., Disp-20 Tab, R-0, Normal                FINAL IMPRESSION  1. Flank pain    2. Nausea          I, Anam Carr (Scribe), am scribing for, and in the presence of, Amarjit Khanna M.D..    Electronically signed by: Anam Carr (Scribe), 6/13/2017    IAmarjit M.D. personally performed the services described in this documentation, as scribed by Anam Carr in my presence, and it is both accurate and complete.    The note accurately reflects work and decisions made by me.  Amarjit Khanna  6/13/2017  5:48 PM

## 2017-06-19 ENCOUNTER — HOSPITAL ENCOUNTER (EMERGENCY)
Dept: HOSPITAL 8 - ED | Age: 77
Discharge: HOME | End: 2017-06-19
Payer: MEDICARE

## 2017-06-19 VITALS — WEIGHT: 114.2 LBS | HEIGHT: 64 IN | BODY MASS INDEX: 19.5 KG/M2

## 2017-06-19 VITALS — SYSTOLIC BLOOD PRESSURE: 165 MMHG

## 2017-06-19 VITALS — DIASTOLIC BLOOD PRESSURE: 73 MMHG

## 2017-06-19 DIAGNOSIS — M79.7: ICD-10-CM

## 2017-06-19 DIAGNOSIS — M32.9: ICD-10-CM

## 2017-06-19 DIAGNOSIS — R10.9: Primary | ICD-10-CM

## 2017-06-19 DIAGNOSIS — E07.9: ICD-10-CM

## 2017-06-19 DIAGNOSIS — J45.909: ICD-10-CM

## 2017-06-19 DIAGNOSIS — E78.5: ICD-10-CM

## 2017-06-19 LAB
AST SERPL-CCNC: 23 U/L (ref 15–37)
BUN SERPL-MCNC: 17 MG/DL (ref 7–18)

## 2017-06-19 PROCEDURE — 71010: CPT

## 2017-06-19 PROCEDURE — 96374 THER/PROPH/DIAG INJ IV PUSH: CPT

## 2017-06-19 PROCEDURE — 99285 EMERGENCY DEPT VISIT HI MDM: CPT

## 2017-06-19 PROCEDURE — 85025 COMPLETE CBC W/AUTO DIFF WBC: CPT

## 2017-06-19 PROCEDURE — 74176 CT ABD & PELVIS W/O CONTRAST: CPT

## 2017-06-19 PROCEDURE — 81003 URINALYSIS AUTO W/O SCOPE: CPT

## 2017-06-19 PROCEDURE — 87040 BLOOD CULTURE FOR BACTERIA: CPT

## 2017-06-19 PROCEDURE — 96361 HYDRATE IV INFUSION ADD-ON: CPT

## 2017-06-19 PROCEDURE — 96375 TX/PRO/DX INJ NEW DRUG ADDON: CPT

## 2017-06-19 PROCEDURE — 36415 COLL VENOUS BLD VENIPUNCTURE: CPT

## 2017-06-19 PROCEDURE — 83605 ASSAY OF LACTIC ACID: CPT

## 2017-06-19 PROCEDURE — 80053 COMPREHEN METABOLIC PANEL: CPT

## 2017-06-20 NOTE — PROGRESS NOTES
Attempt #:2    WebIZ Checked & Epic Updated: yes  HealthConnect Verified: no  Verify PCP: yes    Communication Preference Obtained: yes     Review Care Team: yes    Annual Wellness Visit Scheduling  1. Scheduling Status:Scheduled- Scheduled AWV and New Patient Apt.      Care Gap Scheduling (Attempt to Schedule EACH Overdue Care Gap!)     Health Maintenance Due   Topic Date Due   • Annual Wellness Visit  Scheduled   • PAP SMEAR  Excluded   • COLONOSCOPY  Has apt. Scheduled already   • IMM DTaP/Tdap/Td Vaccine (1 - Tdap) Informed/ Will discuss at New Pt Apt.   • MAMMOGRAM  Informed/ Will discuss at New Pt Apt.   • BONE DENSITY  Informed/ Will discuss at New Pt Apt.         Facio Activation: sent activation code  Facio Chirstian: no  Virtual Visits: no  Opt In to Text Messages: no

## 2017-06-29 ENCOUNTER — HOSPITAL ENCOUNTER (OUTPATIENT)
Facility: MEDICAL CENTER | Age: 77
End: 2017-06-29
Attending: INTERNAL MEDICINE
Payer: MEDICARE

## 2017-06-29 ENCOUNTER — OFFICE VISIT (OUTPATIENT)
Dept: MEDICAL GROUP | Facility: PHYSICIAN GROUP | Age: 77
End: 2017-06-29
Payer: MEDICARE

## 2017-06-29 VITALS
HEART RATE: 72 BPM | BODY MASS INDEX: 19.46 KG/M2 | OXYGEN SATURATION: 96 % | SYSTOLIC BLOOD PRESSURE: 128 MMHG | DIASTOLIC BLOOD PRESSURE: 84 MMHG | RESPIRATION RATE: 16 BRPM | HEIGHT: 64 IN | TEMPERATURE: 99.4 F | WEIGHT: 114 LBS

## 2017-06-29 DIAGNOSIS — L43.9 LICHEN PLANUS: ICD-10-CM

## 2017-06-29 DIAGNOSIS — J45.909 REACTIVE AIRWAY DISEASE WITHOUT COMPLICATION: ICD-10-CM

## 2017-06-29 DIAGNOSIS — E78.5 OTHER AND UNSPECIFIED HYPERLIPIDEMIA: ICD-10-CM

## 2017-06-29 DIAGNOSIS — R10.9 RIGHT LATERAL ABDOMINAL PAIN: ICD-10-CM

## 2017-06-29 DIAGNOSIS — G43.019 INTRACTABLE MIGRAINE WITHOUT AURA AND WITHOUT STATUS MIGRAINOSUS: ICD-10-CM

## 2017-06-29 DIAGNOSIS — I35.9 AORTIC VALVE DISEASE: ICD-10-CM

## 2017-06-29 DIAGNOSIS — M35.00 SJOGREN'S SYNDROME, WITH UNSPECIFIED ORGAN INVOLVEMENT (HCC): ICD-10-CM

## 2017-06-29 DIAGNOSIS — E87.1 HYPONATREMIA: ICD-10-CM

## 2017-06-29 DIAGNOSIS — I05.9 MITRAL VALVE DISORDER: ICD-10-CM

## 2017-06-29 DIAGNOSIS — K22.70 BARRETT'S ESOPHAGUS WITHOUT DYSPLASIA: ICD-10-CM

## 2017-06-29 DIAGNOSIS — M32.9 SYSTEMIC LUPUS ERYTHEMATOSUS, UNSPECIFIED SLE TYPE, UNSPECIFIED ORGAN INVOLVEMENT STATUS (HCC): ICD-10-CM

## 2017-06-29 DIAGNOSIS — R10.9 RIGHT FLANK PAIN: ICD-10-CM

## 2017-06-29 LAB
APPEARANCE UR: ABNORMAL
BILIRUB UR STRIP-MCNC: ABNORMAL MG/DL
COLOR UR AUTO: ABNORMAL
GLUCOSE UR STRIP.AUTO-MCNC: ABNORMAL MG/DL
KETONES UR STRIP.AUTO-MCNC: 160 MG/DL
LEUKOCYTE ESTERASE UR QL STRIP.AUTO: ABNORMAL
NITRITE UR QL STRIP.AUTO: ABNORMAL
PH UR STRIP.AUTO: 6.5 [PH] (ref 5–8)
PROT UR QL STRIP: ABNORMAL MG/DL
RBC UR QL AUTO: ABNORMAL
SP GR UR STRIP.AUTO: 1
UROBILINOGEN UR STRIP-MCNC: ABNORMAL MG/DL

## 2017-06-29 PROCEDURE — 87186 SC STD MICRODIL/AGAR DIL: CPT

## 2017-06-29 PROCEDURE — 81002 URINALYSIS NONAUTO W/O SCOPE: CPT | Performed by: INTERNAL MEDICINE

## 2017-06-29 PROCEDURE — 81001 URINALYSIS AUTO W/SCOPE: CPT

## 2017-06-29 PROCEDURE — 99204 OFFICE O/P NEW MOD 45 MIN: CPT | Performed by: INTERNAL MEDICINE

## 2017-06-29 PROCEDURE — 87086 URINE CULTURE/COLONY COUNT: CPT

## 2017-06-29 PROCEDURE — 87077 CULTURE AEROBIC IDENTIFY: CPT

## 2017-06-29 RX ORDER — CIPROFLOXACIN 500 MG/1
500 TABLET, FILM COATED ORAL 2 TIMES DAILY
Qty: 10 TAB | Refills: 0 | Status: SHIPPED | OUTPATIENT
Start: 2017-06-29 | End: 2017-11-03

## 2017-06-29 RX ORDER — CURCUMIN 100 %
POWDER (GRAM) MISCELLANEOUS
COMMUNITY

## 2017-06-29 RX ORDER — ALBUTEROL SULFATE 90 UG/1
2 AEROSOL, METERED RESPIRATORY (INHALATION) EVERY 6 HOURS PRN
Qty: 8.5 G | Refills: 1 | Status: SHIPPED | OUTPATIENT
Start: 2017-06-29 | End: 2018-06-26

## 2017-06-29 RX ORDER — TOPIRAMATE 100 MG/1
100 TABLET, FILM COATED ORAL 2 TIMES DAILY
COMMUNITY
End: 2017-11-03

## 2017-06-29 ASSESSMENT — PATIENT HEALTH QUESTIONNAIRE - PHQ9
CLINICAL INTERPRETATION OF PHQ2 SCORE: 4
5. POOR APPETITE OR OVEREATING: 0 - NOT AT ALL

## 2017-06-29 ASSESSMENT — PAIN SCALES - GENERAL: PAINLEVEL: 7=MODERATE-SEVERE PAIN

## 2017-06-29 NOTE — MR AVS SNAPSHOT
"        Mariama Gonzales   2017 4:20 PM   Office Visit   MRN: [id number]    Department:  [facility]   Dept Phone:  [phone]    Description:  Female : 1940   Provider:  Todd Coronel M.D.           Reason for Visit     Establish Care PT HAS ABDOMINAL/BACK PX WAS TREATED AT [facility]     Referral Needed ent-SINUS AND EARS KEEP POPPING FEELS LIKE SHE IS UNDER WATER      Allergies as of 2017     Allergen Noted Reactions    Ciprofloxacin 2017       Other Drug 2010       \"tumor necrosis factor drugs\" infections    Sulfa Drugs 2010   Hives      You were diagnosed with     Gtz's esophagus without dysplasia   [596336]       Right lateral abdominal pain   [069011]       Right flank pain   [190486]       Systemic lupus erythematosus, unspecified SLE type, unspecified organ involvement status (CMS-HCC)   [3014988]       Other and unspecified hyperlipidemia   [272.4.ICD-9-CM]       Intractable migraine without aura and without status migrainosus   [440164]       Hyponatremia   [128193]       Aortic valve disease   [477119]       Lichen planus   [697.0.ICD-9-CM]       Mitral valve disorder   [031272]       Sjogren's syndrome, with unspecified organ involvement (CMS-HCC)   [1374186]       Reactive airway disease without complication   [6441110]         Vital Signs     Blood Pressure Pulse Temperature Respirations Height Weight    128/84 mmHg 72 37.4 °C (99.4 °F) 16 1.626 m (5' 4\") 51.71 kg (114 lb)    Body Mass Index Oxygen Saturation Breastfeeding? Smoking Status          19.56 kg/m2 96% No Former Smoker        Basic Information     Date Of Birth Sex Race Ethnicity Preferred Language    1940 Female White Non- English      Your appointments     2017  3:00 PM   ANNUAL EXAM PREVENTATIVE with SOCRATES Zuluaga   [facility] [place] (--)    [address]  [address]  [place] [id number] [phone]            2017  2:20 PM   Established Patient with " Todd Coronel M.D.   [facility] [place] (--)    [address] [place] [address]  [address]  [place] [id number] [phone]           You will be receiving a confirmation call a few days before your appointment from our automated call confirmation system.              Problem List              ICD-10-CM Priority Class Noted - Resolved    Other and unspecified hyperlipidemia E78.5 High  7/14/2011 - Present    Lupus (systemic lupus erythematosus) (CMS-HCC) M32.9   7/14/2011 - Present    Sjogren's syndrome (CMS-[facility]) M35.00   7/14/2011 - Present    Aortic valve disease I35.9   7/14/2011 - Present    Mitral valve disorder I05.9   7/14/2011 - Present    Lichen planus L43.9   7/14/2011 - Present    Hyponatremia E87.1   1/28/2014 - Present    Migraine without aura G43.009   Unknown - Present    Gtz's esophagus without dysplasia K22.70   6/29/2017 - Present    Right lateral abdominal pain R10.9   6/29/2017 - Present    Right flank pain R10.9   6/29/2017 - Present    Reactive airway disease without complication J45.909   6/29/2017 - Present      Health Maintenance        Date Due Completion Dates    PAP SMEAR 6/27/1962 ---    COLONOSCOPY 6/27/1991 ---    IMM DTaP/Tdap/Td Vaccine (1 - Tdap) 8/7/2010 8/6/2010    MAMMOGRAM 8/26/2014 8/26/2013, 6/29/2012, 12/3/2010, 8/11/2009, 8/11/2009, 7/28/2008, 7/28/2008, 5/8/2007, 5/8/2007, 2/23/2006, 10/20/2004    BONE DENSITY 12/3/2015 12/3/2010            Current Immunizations     13-VALENT PCV PREVNAR 11/24/2015    Hep A/HEP B Combined Vaccine (TwinRix) 12/16/2013, 11/16/2013    Influenza TIV (IM) 10/28/2013    Influenza Vaccine Adult HD 11/24/2015    Pneumococcal polysaccharide vaccine (PPSV-23) 12/28/2012    SHINGLES VACCINE 1/4/2012    TD Vaccine 8/6/2010      Below and/or attached are the medications your provider expects you to take. Review all of your home medications and newly ordered medications with your provider and/or pharmacist. Follow medication instructions as directed by your  provider and/or pharmacist. Please keep your medication list with you and share with your provider. Update the information when medications are discontinued, doses are changed, or new medications (including over-the-counter products) are added; and carry medication information at all times in the event of emergency situations     Allergies:  CIPROFLOXACIN - (reactions not documented)     OTHER DRUG - (reactions not documented)     SULFA DRUGS - Hives               Medications  Valid as of: June 29, 2017 -  5:34 PM    Generic Name Brand Name Tablet Size Instructions for use    Albuterol Sulfate (Aero Soln) albuterol 108 (90 BASE) MCG/ACT Inhale 2 Puffs by mouth every 6 hours as needed for Shortness of Breath.        Aspirin (Tablet Delayed Response) ECOTRIN 325 MG Take 1 Tab by mouth every day.        Biotin   Take  by mouth every day.        Ciprofloxacin HCl (Tab) CIPRO 500 MG Take 1 Tab by mouth 2 times a day.        Coenzyme Q10 (Cap) coenzyme Q-10 30 MG Take 60 mg by mouth every day.        Folic Acid   Take  by mouth every day.        Glucosamine-Chondroitin   Take  by mouth.        Hyoscyamine Sulfate   Take  by mouth as needed.        Multiple Vitamins-Minerals   Take  by mouth every day.        Naproxen (Tab) NAPROSYN 375 MG Take 375 mg by mouth 2 times a day, with meals.        Simvastatin   Take 10 mg by mouth every evening.        Topiramate (Tab) TOPAMAX 100 MG Take 100 mg by mouth 2 times a day.        Turmeric (Curcuma Longa) (Powder) Curcumin  by Does not apply route.        .                 Medicines prescribed today were sent to:     [id number] [place] - [address]    [address] [facility] [place] [address]    Phone: [phone] Fax: [phone]    Open 24 Hours?: No      Medication refill instructions:       If your prescription bottle indicates you have medication refills left, it is not necessary to call your provider’s office. Please contact your pharmacy and they will refill your  medication.    If your prescription bottle indicates you do not have any refills left, you may request refills at any time through one of the following ways: The online Travel.ru system (except Urgent Care), by calling your provider’s office, or by asking your pharmacy to contact your provider’s office with a refill request. Medication refills are processed only during regular business hours and may not be available until the next business day. Your provider may request additional information or to have a follow-up visit with you prior to refilling your medication.   *Please Note: Medication refills are assigned a new Rx number when refilled electronically. Your pharmacy may indicate that no refills were authorized even though a new prescription for the same medication is available at the pharmacy. Please request the medicine by name with the pharmacy before contacting your provider for a refill.        Your To Do List     Future Labs/Procedures Complete By Expires    URINALYSIS,CULTURE IF INDICATED  As directed 6/29/2018         Travel.ru Access Code: [email or web address]  Expires: 7/13/2017 12:46 PM    Travel.ru  A secure, online tool to manage your health information     [employer]’s Travel.ru® is a secure, online tool that connects you to your personalized health information from the privacy of your home -- day or night - making it very easy for you to manage your healthcare. Once the activation process is completed, you can even access your medical information using the Travel.ru christian, which is available for free in the Apple Christian store or Google Play store.     Travel.ru provides the following levels of access (as shown below):   My Chart Features   [facility] Primary Care Doctor [facility]  Specialists [facility]  Urgent  Care Non-[facility]  Primary Care  Doctor   Email your healthcare team securely and privately 24/7 X X X    Manage appointments: schedule your next appointment; view details of past/upcoming appointments X      Request  prescription refills. X      View recent personal medical records, including lab and immunizations X X X X   View health record, including health history, allergies, medications X X X X   Read reports about your outpatient visits, procedures, consult and ER notes X X X X   See your discharge summary, which is a recap of your hospital and/or ER visit that includes your diagnosis, lab results, and care plan. X X       How to register for Adviqo:  1. Go to  [email or web address].  2. Click on the Sign Up Now box, which takes you to the New Member Sign Up page. You will need to provide the following information:  a. Enter your Adviqo Access Code exactly as it appears at the top of this page. (You will not need to use this code after you’ve completed the sign-up process. If you do not sign up before the expiration date, you must request a new code.)   b. Enter your date of birth.   c. Enter your home email address.   d. Click Submit, and follow the next screen’s instructions.  3. Create a Adviqo ID. This will be your Adviqo login ID and cannot be changed, so think of one that is secure and easy to remember.  4. Create a Adviqo password. You can change your password at any time.  5. Enter your Password Reset Question and Answer. This can be used at a later time if you forget your password.   6. Enter your e-mail address. This allows you to receive e-mail notifications when new information is available in Adviqo.  7. Click Sign Up. You can now view your health information.    For assistance activating your Adviqo account, call [phone]

## 2017-06-30 LAB
APPEARANCE UR: ABNORMAL
BACTERIA #/AREA URNS HPF: ABNORMAL /HPF
BILIRUB UR QL STRIP.AUTO: NEGATIVE
CAOX CRY #/AREA URNS HPF: ABNORMAL /HPF
COLOR UR: ABNORMAL
CULTURE IF INDICATED INDCX: YES UA CULTURE
EPI CELLS #/AREA URNS HPF: NEGATIVE /HPF
GLUCOSE UR STRIP.AUTO-MCNC: NEGATIVE MG/DL
HYALINE CASTS #/AREA URNS LPF: ABNORMAL /LPF
KETONES UR STRIP.AUTO-MCNC: ABNORMAL MG/DL
LEUKOCYTE ESTERASE UR QL STRIP.AUTO: ABNORMAL
MICRO URNS: ABNORMAL
NITRITE UR QL STRIP.AUTO: NEGATIVE
PH UR STRIP.AUTO: 6 [PH]
PROT UR QL STRIP: NEGATIVE MG/DL
RBC # URNS HPF: ABNORMAL /HPF
RBC UR QL AUTO: NEGATIVE
SP GR UR STRIP.AUTO: 1.02
WBC #/AREA URNS HPF: ABNORMAL /HPF

## 2017-06-30 NOTE — PROGRESS NOTES
New Patient to Establish    Reason to establish: Right flank pain, denied abdominal pain, chronic medical problems    Mariama Gonzales is a 77 y.o. female here today for evaluation and management of:    Other and unspecified hyperlipidemia   (2013). Will reevaluate at next apt    Aortic valve disease  Mild AI, not symptomatic . Follows with cardiology.     Gtz's esophagus without dysplasia  She has gastroenterologist. Not sure why she is not on PPI. She denies dysphagia, intervention would loss, hematochezia, melanotic stool. Sometimes she is nauseated.    Hyponatremia  Chronic since 2015. Not symptomatic. euvolemic    Lichen planus  She has lischen planus of her mouth. Stable     Lupus (systemic lupus erythematosus)  She tells me that she has lupus and sjogren. JOSÉ ANTONIO negative, RF negative. Then she tells me that she has fibromyalgia. Not on meds??     Migraine without aura  She tells me that she has migraines headache. She is on Topamax for prevention.    Mitral valve disorder  History of mitral valve prolapse. She denies palpitation, chest pain, shortness of breath. Echocardiogram with Dr. Grimaldo at [place]    Right flank pain  She tells me that she is having right upper abdominal pain and right flank pain for 5 weeks. Pain is worse when she would lie in her back. She had a slight fever today. She denies chills. She has some dysuria. Pain is almost constant and she described most that sharp pain . She feels that the pain starts in her back and goes to the right upper quadrant abdomen . She denies diarrhea . She has nausea because of Gtz's esophagus . She has never had kidney stone .She was treated for UTI 3 weeks ago with macrobid. She saw gastroenterology who ordered CT scan with contrast. She denies hematuria. She has no pain on the left side. She had done an abdomen/pelvis  CT 06/13/2017.   1.  Mild left pelvocaliectasis. Nonspecific finding but could be related to infection.  2.  No right  hydronephrosis.  3.  Gallbladder is not well demonstrated.  4.  Diverticula of the colon. No evidence diverticulitis. No free fluid.  5.  The appendix is not delineated    Right lateral abdominal pain  See above    Sjogren's syndrome  No sjogren antibody on file. Follow up with rheumatology. She has joint pain chronically         Past Medical History   Diagnosis Date   • Lupus (CMS-[facility])    • Sjogren's syndrome (CMS-[facility])    • Fibromyalgia    • Lichen planus    • GERD (gastroesophageal reflux disease)    • Mitral valve prolapse    • Arthritis    • Kidney calculi    • GERD (gastroesophageal reflux disease)    • Dental disorder      lichen planus   • Snoring    • CATARACT    • Pain      joints, muscles   • Psychiatric problem      depression   • Shingles 10/4/11     dr velasco aware of this back of head   • Other convulsions      Isolated seizure March, 2014   • Allergy, unspecified not elsewhere classified    • Anxiety    • Unspecified asthma    • Blood transfusion, without reported diagnosis    • Depression    • Headache    • Headache, classical migraine    • Osteoporosis, unspecified    • Urinary tract infection, site not specified    • Migraine without aura, without mention of intractable migraine without mention of status migrainosus        Current Outpatient Prescriptions   Medication Sig Dispense Refill   • coenzyme Q-10 30 MG capsule Take 60 mg by mouth every day.     • Turmeric, Curcuma Longa, (CURCUMIN) Powder by Does not apply route.     • topiramate (TOPAMAX) 100 MG Tab Take 100 mg by mouth 2 times a day.     • ciprofloxacin (CIPRO) 500 MG Tab Take 1 Tab by mouth 2 times a day. 10 Tab 0   • albuterol 108 (90 BASE) MCG/ACT Aero Soln inhalation aerosol Inhale 2 Puffs by mouth every 6 hours as needed for Shortness of Breath. 8.5 g 1   • aspirin EC (ECOTRIN) 325 MG Tablet Delayed Response Take 1 Tab by mouth every day. 40 Tab 0   • GLUCOSAMINE-CHONDROITIN PO Take  by mouth.     • FOLIC ACID PO Take  by mouth every  day.     • Hyoscyamine Sulfate (LEVSIN PO) Take  by mouth as needed.     • SIMVASTATIN PO Take 10 mg by mouth every evening.     • BIOTIN 5000 PO Take  by mouth every day.     • Multiple Vitamin (MULTIVITAMIN PO) Take  by mouth every day.     • naproxen (NAPROSYN) 375 MG TABS Take 375 mg by mouth 2 times a day, with meals.       No current facility-administered medications for this visit.       Allergies as of 06/29/2017 - Jason as Reviewed 06/29/2017   Allergen Reaction Noted   • Ciprofloxacin  06/13/2017   • Other drug  12/01/2010   • Sulfa drugs Hives 12/01/2010       Social History     Social History   • Marital Status:      Spouse Name: N/A   • Number of Children: N/A   • Years of Education: N/A     Occupational History   • Not on file.     Social History Main Topics   • Smoking status: Former Smoker -- 34 years     Quit date: 01/28/1978   • Smokeless tobacco: Former User     Quit date: 05/27/1978   • Alcohol Use: Yes      Comment: rarely   • Drug Use: No   • Sexual Activity: Not on file     Other Topics Concern   • Not on file     Social History Narrative       Family History   Problem Relation Age of Onset   • Arthritis Mother    • Cancer Father    • Heart Disease Father    • Hypertension Father    • Diabetes Maternal Aunt    • Cancer Maternal Uncle    • Heart Disease Maternal Grandmother    • Diabetes Maternal Grandfather    • Genetic Paternal Grandmother    • Heart Disease Paternal Grandfather    • Hyperlipidemia Paternal Grandfather    • Diabetes Maternal Aunt    • Genetic Maternal Uncle    • Cancer Paternal Aunt    • Cancer Paternal Aunt    • Cancer Paternal Uncle        Past Surgical History   Procedure Laterality Date   • Mastectomy  1972     b/l   • Mammoplasty augmentation  1988     removed, post op complications   • Other  2007     reconstruction of shoulder to cover chest wall   • Breast biopsy  1970's     x 5   • Other  1988     17 reconstruction surgeries post mammoplasty   • Shoulder  "decompression arthroscopic  10/6/2011     Performed by LARY CAR at SURGERY [facility]   • Clavicle distal excision  10/6/2011     Performed by LARY CAR at [facility] ORS   • Closed reduction  8/6/2012     Performed by PEDRO DELGADO at [facility] ORS   • Hip cannulated screw  8/6/2012     Performed by PEDRO DELGADO at [facility] ORS   • Hip cannulated screw  1/28/2014     Performed by Pedro Delgado M.D. at SURGERY [facility] ORS   • Abdominal exploration     • Eye surgery     • Open reduction         ROS: All systems reviewed are negative except for HPI    /84 mmHg  Pulse 72  Temp(Src) 37.4 °C (99.4 °F)  Resp 16  Ht 1.626 m (5' 4\")  Wt 51.71 kg (114 lb)  BMI 19.56 kg/m2  SpO2 96%  Breastfeeding? No    Physical Exam  General:  Alert and oriented, No apparent distress.  Eyes: Pupils equal and reactive. No scleral icterus. EOMI  Throat: Clear no erythema or exudates noted. Lichen palnus . Oral mucosa moist, oral dental intact  Neck: Supple. No cervical or supraclavicular lymphadenopathy noted. Thyroid not enlarged.  Lungs: normal effort,  Clear to auscultation  Cardiovascular: Regular rate and rhythm. No murmurs, rubs or gallops, pulses intact   Abdomen:  Soft, +BS, some tenderness on the right side. No rebound or guarding noted. No hepato or splenomegaly . Gibson signs negative  Extremities: No clubbing, cyanosis, edema.  Neuro: cranial nerves intact, sensation intact   Back she has positive CVA in the right flank   Skin: Clear. No rash or suspicious skin lesions noted.      Assessment and Plan    1. Gtz's esophagus without dysplasia  Stable, not sure why she is not on PPI. Will try to get records at next apt     2. Right lateral abdominal pain  3. Right flank pain  UA dipstick in the clinic positive for LE, trace blood. Concerned for possible complicated UTI.  CVA positive.   Discussed with patient in regards to treatment. Agreed to " start antibiotic ciprofloxacin. She reports that has some muscle from cipro but she agreed to continue treatment since it is the best option for complicated UTI  - POCT Urinalysis    4. Systemic lupus erythematosus, unspecified SLE type, unspecified organ involvement status (CMS-[facility])  5. Sjogren's syndrome, with unspecified organ involvement (CMS-[facility])  Rheumatologic markers negative in the past. CRP slightly elevated in the past. Will try to get some records at next apt       6. Other and unspecified hyperlipidemia  Continue same treatment. Reevaluate at next appointment    7. Intractable migraine without aura and without status migrainosus  Stable continue, Topamax    8. Hyponatremia  Continue monitor. Patient have had one episode of seizure because of hyponatremia. 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 Aortic valve disease  10. Mitral valve disorder  Asymptomatic, stable, follow-up with cardiology      11. Lichen planus  Continue to monitor      Total 40 minutes face-to-face time spent with patient, with greater than 50% of the total time discussing patient's issues and symptoms as listed above in assessment and plan, as well as managing coordination of care for future evaluation and treatment.      Followup: Return in about 2 weeks (around 7/13/2017) for Long.      Signed by: Todd Coronel M.D.

## 2017-06-30 NOTE — ASSESSMENT & PLAN NOTE
She tells me that she has lupus and sjogren. JOSÉ ANTONIO negative, RF negative. Then she tells me that she has fibromyalgia. Not on meds??

## 2017-06-30 NOTE — ASSESSMENT & PLAN NOTE
History of mitral valve prolapse. She denies palpitation, chest pain, shortness of breath. Echocardiogram with Dr. Grimaldo at [place]

## 2017-06-30 NOTE — ASSESSMENT & PLAN NOTE
She has gastroenterologist. Not sure why she is not on PPI. She denies dysphagia, intervention would loss, hematochezia, melanotic stool. Sometimes she is nauseated.

## 2017-06-30 NOTE — ASSESSMENT & PLAN NOTE
She tells me that she is having right upper abdominal pain and right flank pain for 5 weeks. Pain is worse when she would lie in her back. She had a slight fever today. She denies chills. She has some dysuria. Pain is almost constant and she described most that sharp pain . She feels that the pain starts in her back and goes to the right upper quadrant abdomen . She denies diarrhea . She has nausea because of Gtz's esophagus . She has never had kidney stone .She was treated for UTI 3 weeks ago with macrobid. She saw gastroenterology who ordered CT scan with contrast. She denies hematuria. She has no pain on the left side. She had done an abdomen/pelvis  CT 06/13/2017.   1.  Mild left pelvocaliectasis. Nonspecific finding but could be related to infection.  2.  No right hydronephrosis.  3.  Gallbladder is not well demonstrated.  4.  Diverticula of the colon. No evidence diverticulitis. No free fluid.  5.  The appendix is not delineated

## 2017-07-02 LAB
BACTERIA UR CULT: ABNORMAL
BACTERIA UR CULT: ABNORMAL
SIGNIFICANT IND 70042: ABNORMAL
SOURCE SOURCE: ABNORMAL

## 2017-07-05 ENCOUNTER — TELEPHONE (OUTPATIENT)
Dept: MEDICAL GROUP | Facility: PHYSICIAN GROUP | Age: 77
End: 2017-07-05

## 2017-07-06 ENCOUNTER — TELEPHONE (OUTPATIENT)
Dept: MEDICAL GROUP | Facility: PHYSICIAN GROUP | Age: 77
End: 2017-07-06

## 2017-07-06 NOTE — TELEPHONE ENCOUNTER
Called and spoke with patient regarding  Lab results. Patient said she got her cat scan done 06/13/2017 and still has not received a call with her results. CAMILLE

## 2017-07-06 NOTE — TELEPHONE ENCOUNTER
----- Message from Todd Coronel M.D. sent at 7/5/2017 12:32 PM PDT -----  Please let the patient noted that she had urinary tract infection and bacteria is sensitive to the antibiotic I have prescribed her last  Thank you,  Todd Coronel M.D.

## 2017-07-06 NOTE — TELEPHONE ENCOUNTER
Called and spoke with patients son. Asked where they got labs done. Son said [facility]. Advised patients son to call [facility] to get results from them. CAMILLE

## 2017-08-02 ENCOUNTER — TELEPHONE (OUTPATIENT)
Dept: MEDICAL GROUP | Facility: PHYSICIAN GROUP | Age: 77
End: 2017-08-02

## 2017-08-02 NOTE — TELEPHONE ENCOUNTER
ESTABLISHED PATIENT PRE-VISIT PLANNING     Note: Patient will not be contacted if there is no indication to call.     1.  Reviewed notes from the last few office visits within the medical group: Yes    2.  If any orders were placed at last visit or intended to be done for this visit (i.e. 6 mos follow-up), do we have Results/Consult Notes?        •  Labs - Labs were not ordered at last office visit.       •  Imaging - Imaging was not ordered at last office visit.       •  Referrals - No referrals were ordered at last office visit.    3. Is this appointment scheduled as a Hospital Follow-Up? No    4.  Immunizations were updated in [facility] using WebIZ?: Yes       •  Web Iz Recommendations: FLU, HEPATITIS A , HEPATITIS B and TDAP    5.  Patient is due for the following Health Maintenance Topics:   Health Maintenance Due   Topic Date Due   • Annual Wellness Visit  1940   • PAP SMEAR  06/27/1962   • COLONOSCOPY  06/27/1991   • IMM DTaP/Tdap/Td Vaccine (1 - Tdap) 08/07/2010   • MAMMOGRAM  08/26/2014   • BONE DENSITY  12/03/2015       - Patient has completed FLU, HEPATITIS A , HEPATITIS B, PNEUMOVAX (PPSV23), PREVNAR (PCV13) , TD and ZOSTAVAX (Shingles) Immunization(s) per WebIZ. Chart has been updated.    6.  Patient was NOT informed to arrive 15 min prior to their scheduled appointment and bring in their medication bottles.

## 2017-08-03 ENCOUNTER — OFFICE VISIT (OUTPATIENT)
Dept: MEDICAL GROUP | Facility: PHYSICIAN GROUP | Age: 77
End: 2017-08-03
Payer: MEDICARE

## 2017-08-03 ENCOUNTER — HOSPITAL ENCOUNTER (OUTPATIENT)
Dept: LAB | Facility: MEDICAL CENTER | Age: 77
End: 2017-08-03
Attending: INTERNAL MEDICINE
Payer: MEDICARE

## 2017-08-03 ENCOUNTER — HOSPITAL ENCOUNTER (OUTPATIENT)
Facility: MEDICAL CENTER | Age: 77
End: 2017-08-03
Attending: INTERNAL MEDICINE
Payer: MEDICARE

## 2017-08-03 VITALS
TEMPERATURE: 98.4 F | OXYGEN SATURATION: 95 % | DIASTOLIC BLOOD PRESSURE: 70 MMHG | BODY MASS INDEX: 19.12 KG/M2 | HEIGHT: 64 IN | HEART RATE: 86 BPM | SYSTOLIC BLOOD PRESSURE: 108 MMHG | RESPIRATION RATE: 16 BRPM | WEIGHT: 112 LBS

## 2017-08-03 DIAGNOSIS — R10.9 RIGHT FLANK PAIN: ICD-10-CM

## 2017-08-03 DIAGNOSIS — R10.9 RIGHT LATERAL ABDOMINAL PAIN: ICD-10-CM

## 2017-08-03 DIAGNOSIS — M35.00 SJOGREN'S SYNDROME, WITH UNSPECIFIED ORGAN INVOLVEMENT (HCC): ICD-10-CM

## 2017-08-03 DIAGNOSIS — M54.40 CHRONIC RIGHT-SIDED LOW BACK PAIN WITH SCIATICA, SCIATICA LATERALITY UNSPECIFIED: ICD-10-CM

## 2017-08-03 DIAGNOSIS — E78.5 OTHER AND UNSPECIFIED HYPERLIPIDEMIA: ICD-10-CM

## 2017-08-03 DIAGNOSIS — G89.29 CHRONIC RIGHT-SIDED LOW BACK PAIN WITH SCIATICA, SCIATICA LATERALITY UNSPECIFIED: ICD-10-CM

## 2017-08-03 DIAGNOSIS — E87.1 HYPONATREMIA: ICD-10-CM

## 2017-08-03 DIAGNOSIS — M32.9 SYSTEMIC LUPUS ERYTHEMATOSUS, UNSPECIFIED SLE TYPE, UNSPECIFIED ORGAN INVOLVEMENT STATUS (HCC): ICD-10-CM

## 2017-08-03 LAB
ALBUMIN SERPL BCP-MCNC: 4.4 G/DL (ref 3.2–4.9)
ALBUMIN/GLOB SERPL: 1.5 G/DL
ALP SERPL-CCNC: 60 U/L (ref 30–99)
ALT SERPL-CCNC: 14 U/L (ref 2–50)
ANION GAP SERPL CALC-SCNC: 8 MMOL/L (ref 0–11.9)
APPEARANCE UR: ABNORMAL
APPEARANCE UR: CLEAR
AST SERPL-CCNC: 24 U/L (ref 12–45)
BASOPHILS # BLD AUTO: 1 % (ref 0–1.8)
BASOPHILS # BLD: 0.05 K/UL (ref 0–0.12)
BILIRUB SERPL-MCNC: 0.6 MG/DL (ref 0.1–1.5)
BILIRUB UR QL STRIP.AUTO: NEGATIVE
BILIRUB UR STRIP-MCNC: ABNORMAL MG/DL
BUN SERPL-MCNC: 23 MG/DL (ref 8–22)
C3 SERPL-MCNC: 88 MG/DL (ref 87–200)
C4 SERPL-MCNC: 15 MG/DL (ref 19–52)
CALCIUM SERPL-MCNC: 9.9 MG/DL (ref 8.5–10.5)
CHLORIDE SERPL-SCNC: 98 MMOL/L (ref 96–112)
CO2 SERPL-SCNC: 25 MMOL/L (ref 20–33)
COLOR UR AUTO: ABNORMAL
COLOR UR: ABNORMAL
CREAT SERPL-MCNC: 0.81 MG/DL (ref 0.5–1.4)
CRP SERPL HS-MCNC: 0.07 MG/DL (ref 0–0.75)
CULTURE IF INDICATED INDCX: NO UA CULTURE
EOSINOPHIL # BLD AUTO: 0.11 K/UL (ref 0–0.51)
EOSINOPHIL NFR BLD: 2.3 % (ref 0–6.9)
ERYTHROCYTE [DISTWIDTH] IN BLOOD BY AUTOMATED COUNT: 42 FL (ref 35.9–50)
ERYTHROCYTE [SEDIMENTATION RATE] IN BLOOD BY WESTERGREN METHOD: 8 MM/HOUR (ref 0–30)
GFR SERPL CREATININE-BSD FRML MDRD: >60 ML/MIN/1.73 M 2
GLOBULIN SER CALC-MCNC: 2.9 G/DL (ref 1.9–3.5)
GLUCOSE SERPL-MCNC: 76 MG/DL (ref 65–99)
GLUCOSE UR STRIP.AUTO-MCNC: ABNORMAL MG/DL
GLUCOSE UR STRIP.AUTO-MCNC: NEGATIVE MG/DL
HCT VFR BLD AUTO: 44.5 % (ref 37–47)
HGB BLD-MCNC: 14.7 G/DL (ref 12–16)
IMM GRANULOCYTES # BLD AUTO: 0.01 K/UL (ref 0–0.11)
IMM GRANULOCYTES NFR BLD AUTO: 0.2 % (ref 0–0.9)
KETONES UR STRIP.AUTO-MCNC: ABNORMAL MG/DL
KETONES UR STRIP.AUTO-MCNC: ABNORMAL MG/DL
LEUKOCYTE ESTERASE UR QL STRIP.AUTO: ABNORMAL
LEUKOCYTE ESTERASE UR QL STRIP.AUTO: NEGATIVE
LYMPHOCYTES # BLD AUTO: 1.31 K/UL (ref 1–4.8)
LYMPHOCYTES NFR BLD: 27.4 % (ref 22–41)
MCH RBC QN AUTO: 31.3 PG (ref 27–33)
MCHC RBC AUTO-ENTMCNC: 33 G/DL (ref 33.6–35)
MCV RBC AUTO: 94.9 FL (ref 81.4–97.8)
MICRO URNS: ABNORMAL
MONOCYTES # BLD AUTO: 0.38 K/UL (ref 0–0.85)
MONOCYTES NFR BLD AUTO: 7.9 % (ref 0–13.4)
NEUTROPHILS # BLD AUTO: 2.92 K/UL (ref 2–7.15)
NEUTROPHILS NFR BLD: 61.2 % (ref 44–72)
NITRITE UR QL STRIP.AUTO: ABNORMAL
NITRITE UR QL STRIP.AUTO: NEGATIVE
NRBC # BLD AUTO: 0 K/UL
NRBC BLD AUTO-RTO: 0 /100 WBC
PH UR STRIP.AUTO: 6 [PH]
PH UR STRIP.AUTO: 6 [PH] (ref 5–8)
PLATELET # BLD AUTO: 248 K/UL (ref 164–446)
PMV BLD AUTO: 9.6 FL (ref 9–12.9)
POTASSIUM SERPL-SCNC: 4.2 MMOL/L (ref 3.6–5.5)
PROT SERPL-MCNC: 7.3 G/DL (ref 6–8.2)
PROT UR QL STRIP: ABNORMAL MG/DL
PROT UR QL STRIP: NEGATIVE MG/DL
RBC # BLD AUTO: 4.69 M/UL (ref 4.2–5.4)
RBC UR QL AUTO: ABNORMAL
RBC UR QL AUTO: NEGATIVE
RHEUMATOID FACT SER IA-ACNC: <10 IU/ML (ref 0–14)
SODIUM SERPL-SCNC: 131 MMOL/L (ref 135–145)
SP GR UR STRIP.AUTO: 1.01
SP GR UR STRIP.AUTO: 1.02
UROBILINOGEN UR STRIP-MCNC: ABNORMAL MG/DL
UROBILINOGEN UR STRIP.AUTO-MCNC: 1 MG/DL
WBC # BLD AUTO: 4.8 K/UL (ref 4.8–10.8)

## 2017-08-03 PROCEDURE — 86038 ANTINUCLEAR ANTIBODIES: CPT

## 2017-08-03 PROCEDURE — 99214 OFFICE O/P EST MOD 30 MIN: CPT | Performed by: INTERNAL MEDICINE

## 2017-08-03 PROCEDURE — 86225 DNA ANTIBODY NATIVE: CPT

## 2017-08-03 PROCEDURE — 80074 ACUTE HEPATITIS PANEL: CPT

## 2017-08-03 PROCEDURE — 86431 RHEUMATOID FACTOR QUANT: CPT

## 2017-08-03 PROCEDURE — 85652 RBC SED RATE AUTOMATED: CPT

## 2017-08-03 PROCEDURE — 80048 BASIC METABOLIC PNL TOTAL CA: CPT

## 2017-08-03 PROCEDURE — 85025 COMPLETE CBC W/AUTO DIFF WBC: CPT

## 2017-08-03 PROCEDURE — 86235 NUCLEAR ANTIGEN ANTIBODY: CPT | Mod: 91

## 2017-08-03 PROCEDURE — 86140 C-REACTIVE PROTEIN: CPT

## 2017-08-03 PROCEDURE — 81002 URINALYSIS NONAUTO W/O SCOPE: CPT | Performed by: INTERNAL MEDICINE

## 2017-08-03 PROCEDURE — 36415 COLL VENOUS BLD VENIPUNCTURE: CPT

## 2017-08-03 PROCEDURE — 80053 COMPREHEN METABOLIC PANEL: CPT

## 2017-08-03 PROCEDURE — 86200 CCP ANTIBODY: CPT

## 2017-08-03 PROCEDURE — 86160 COMPLEMENT ANTIGEN: CPT

## 2017-08-03 RX ORDER — CIPROFLOXACIN 500 MG/1
500 TABLET, FILM COATED ORAL 2 TIMES DAILY
Qty: 28 TAB | Refills: 0 | Status: SHIPPED | OUTPATIENT
Start: 2017-08-03 | End: 2017-09-01

## 2017-08-03 NOTE — MR AVS SNAPSHOT
"        Mariama Matthewsvelia   8/3/2017 11:00 AM   Office Visit   MRN: [id number]    Department:  [facility]   Dept Phone:  [phone]    Description:  Female : 1940   Provider:  Todd Coronel M.D.           Reason for Visit     Back Pain stomach pain/ 2 week follow up visit       Allergies as of 8/3/2017     Allergen Noted Reactions    Ciprofloxacin 2017       Other Drug 2010       \"tumor necrosis factor drugs\" infections    Sulfa Drugs 2010   Hives      You were diagnosed with     Right flank pain   [046172]       Chronic right-sided low back pain with sciatica, sciatica laterality unspecified   [0172621]       Systemic lupus erythematosus, unspecified SLE type, unspecified organ involvement status (CMS-HCC)   [8229277]       Sjogren's syndrome, with unspecified organ involvement (CMS-[facility])   [2996866]       Right lateral abdominal pain   [300321]       Other and unspecified hyperlipidemia   [272.4.ICD-9-CM]         Vital Signs     Blood Pressure Pulse Temperature Respirations Height Weight    108/70 mmHg 86 36.9 °C (98.4 °F) 16 1.626 m (5' 4\") 50.803 kg (112 lb)    Body Mass Index Oxygen Saturation Breastfeeding? Smoking Status          19.22 kg/m2 95% No Former Smoker        Basic Information     Date Of Birth Sex Race Ethnicity Preferred Language    1940 Female White Non- English      Your appointments     Aug 10, 2017  4:00 PM   ANNUAL EXAM PREVENTATIVE with SOCRATES Zuluaga   [facility] (--)    [address]  [address]  [facility] [id number] [phone]            Aug 29, 2017  1:00 PM   Established Patient with Todd Coronel M.D.   [facility] (--)    [address]  [address]  [place] [address] [phone]           You will be receiving a confirmation call a few days before your appointment from our automated call confirmation system.              Problem List              ICD-10-CM Priority Class Noted - Resolved    Other and " unspecified hyperlipidemia E78.5 High  7/14/2011 - Present    Lupus (systemic lupus erythematosus) (CMS-HCC) M32.9   7/14/2011 - Present    Sjogren's syndrome (CMS-HCC) M35.00   7/14/2011 - Present    Aortic valve disease I35.9   7/14/2011 - Present    Mitral valve disorder I05.9   7/14/2011 - Present    Lichen planus L43.9   7/14/2011 - Present    Hyponatremia E87.1   1/28/2014 - Present    Migraine without aura G43.009   Unknown - Present    Gtz's esophagus without dysplasia K22.70   6/29/2017 - Present    Right lateral abdominal pain R10.9   6/29/2017 - Present    Right flank pain R10.9   6/29/2017 - Present    Reactive airway disease without complication J45.909   6/29/2017 - Present    Chronic right-sided low back pain with sciatica M54.40, G89.29   8/3/2017 - Present      Health Maintenance        Date Due Completion Dates    PAP SMEAR 6/27/1962 ---    COLONOSCOPY 6/27/1991 ---    IMM DTaP/Tdap/Td Vaccine (1 - Tdap) 8/7/2010 8/6/2010, 4/22/2006    MAMMOGRAM 8/26/2014 8/26/2013, 6/29/2012, 12/3/2010, 8/11/2009, 8/11/2009, 7/28/2008, 7/28/2008, 5/8/2007, 5/8/2007, 2/23/2006, 10/20/2004    BONE DENSITY 12/3/2015 12/3/2010    IMM INFLUENZA (1) 9/1/2017 11/24/2015, 10/31/2014, 10/28/2013, 11/9/2012, 10/22/2011, 9/7/2010            Current Immunizations     13-VALENT PCV PREVNAR 11/24/2015    Hep A/HEP B Combined Vaccine (TwinRix) 12/16/2013, 11/16/2013    Hepatitis B Vaccine Recombivax (Adol/Adult) 2/28/2012    INFLUENZA VACCINE H1N1 1/19/2010    Influenza TIV (IM) 10/28/2013, 9/7/2010    Influenza Vaccine Adult HD 11/24/2015, 10/22/2011    Influenza Vaccine Quad Inj (Preserved) 10/31/2014, 11/9/2012    Pneumococcal polysaccharide vaccine (PPSV-23) 12/28/2012    SHINGLES VACCINE 1/4/2012    TD Vaccine 8/6/2010, 4/22/2006      Below and/or attached are the medications your provider expects you to take. Review all of your home medications and newly ordered medications with your provider and/or pharmacist. Follow  medication instructions as directed by your provider and/or pharmacist. Please keep your medication list with you and share with your provider. Update the information when medications are discontinued, doses are changed, or new medications (including over-the-counter products) are added; and carry medication information at all times in the event of emergency situations     Allergies:  CIPROFLOXACIN - (reactions not documented)     OTHER DRUG - (reactions not documented)     SULFA DRUGS - Hives               Medications  Valid as of: August 03, 2017 - 12:08 PM    Generic Name Brand Name Tablet Size Instructions for use    Albuterol Sulfate (Aero Soln) albuterol 108 (90 BASE) MCG/ACT Inhale 2 Puffs by mouth every 6 hours as needed for Shortness of Breath.        Aspirin (Tablet Delayed Response) ECOTRIN 325 MG Take 1 Tab by mouth every day.        Biotin   Take  by mouth every day.        Ciprofloxacin HCl (Tab) CIPRO 500 MG Take 1 Tab by mouth 2 times a day.        Ciprofloxacin HCl (Tab) CIPRO 500 MG Take 1 Tab by mouth 2 times a day.        Coenzyme Q10 (Cap) coenzyme Q-10 30 MG Take 60 mg by mouth every day.        Folic Acid   Take  by mouth every day.        Glucosamine-Chondroitin   Take  by mouth.        Hyoscyamine Sulfate   Take  by mouth as needed.        Multiple Vitamins-Minerals   Take  by mouth every day.        Naproxen (Tab) NAPROSYN 375 MG Take 375 mg by mouth 2 times a day, with meals.        Simvastatin   Take 10 mg by mouth every evening.        Topiramate (Tab) TOPAMAX 100 MG Take 100 mg by mouth 2 times a day.        Turmeric (Curcuma Longa) (Powder) Curcumin  by Does not apply route.        .                 Medicines prescribed today were sent to:     [id number] [place] - [address]    [address] [place] [address]    Phone: [phone] Fax: [phone]    Open 24 Hours?: No      Medication refill instructions:       If your prescription bottle indicates you have medication  refills left, it is not necessary to call your provider’s office. Please contact your pharmacy and they will refill your medication.    If your prescription bottle indicates you do not have any refills left, you may request refills at any time through one of the following ways: The online [employer] system (except Urgent Care), by calling your provider’s office, or by asking your pharmacy to contact your provider’s office with a refill request. Medication refills are processed only during regular business hours and may not be available until the next business day. Your provider may request additional information or to have a follow-up visit with you prior to refilling your medication.   *Please Note: Medication refills are assigned a new Rx number when refilled electronically. Your pharmacy may indicate that no refills were authorized even though a new prescription for the same medication is available at the pharmacy. Please request the medicine by name with the pharmacy before contacting your provider for a refill.        Your To Do List     Future Labs/Procedures Complete By Expires    JOSÉ ANTONIO ANTIBODY WITH REFLEX  As directed 8/4/2018    CBC WITH DIFFERENTIAL  As directed 8/4/2018    CCP ANTIBODY  As directed 8/4/2018    COMP METABOLIC PANEL  As directed 8/4/2018    COMPLEMENT C3+C4 SERUM  As directed 8/3/2018    CRP QUANTITIVE (NON-CARDIAC)  As directed 8/3/2018    RHEUMATOID ARTHRITIS FACTOR  As directed 8/3/2018    SSA 52 AND 60 (RO)(JEANNA) AB, IGG  As directed 8/3/2018    WESTERGREN SED RATE  As directed 8/3/2018    WESTERGREN SED RATE  As directed 8/3/2018         [employer] Access Code: [email or web address]  Expires: 9/2/2017 11:04 AM    [employer]  A secure, online tool to manage your health information     [employer]’s [employer]® is a secure, online tool that connects you to your personalized health information from the privacy of your home -- day or night - making it very easy for you to manage your healthcare. Once the  activation process is completed, you can even access your medical information using the Skyonic christian, which is available for free in the Apple Christian store or Google Play store.     Sky[employer] provides the following levels of access (as shown below):   My Chart Features   [facility] Doctor [facility]  Specialists [facility]  Urgent  Care Non-[facility]  Primary Care  Doctor   Email your healthcare team securely and privately 24/7 X X X    Manage appointments: schedule your next appointment; view details of past/upcoming appointments X      Request prescription refills. X      View recent personal medical records, including lab and immunizations X X X X   View health record, including health history, allergies, medications X X X X   Read reports about your outpatient visits, procedures, consult and ER notes X X X X   See your discharge summary, which is a recap of your hospital and/or ER visit that includes your diagnosis, lab results, and care plan. X X       How to register for [employer]:  1. Go to  [email or web address].  2. Click on the Sign Up Now box, which takes you to the New Member Sign Up page. You will need to provide the following information:  a. Enter your Skyonic Access Code exactly as it appears at the top of this page. (You will not need to use this code after you’ve completed the sign-up process. If you do not sign up before the expiration date, you must request a new code.)   b. Enter your date of birth.   c. Enter your home email address.   d. Click Submit, and follow the next screen’s instructions.  3. Create a Skyonic ID. This will be your Skyonic login ID and cannot be changed, so think of one that is secure and easy to remember.  4. Create a [employer] password. You can change your password at any time.  5. Enter your Password Reset Question and Answer. This can be used at a later time if you forget your password.   6. Enter your e-mail address. This allows you to receive e-mail notifications when new  information is available in [employer].  7. Click Sign Up. You can now view your health information.    For assistance activating your [employer] account, call [phone]

## 2017-08-04 NOTE — ASSESSMENT & PLAN NOTE
She reports that she had bad all low back pain in the possibly sciatic pain radiation. Stable, flank pain she is experiencing is not the same. She denies numbness or tingling

## 2017-08-04 NOTE — PROGRESS NOTES
Subjective:   Mariama Gonzales is a 77 y.o. female here today for flank pain, lab work, review of chronic medical problems     Other and unspecified hyperlipidemia  She tells me she had some lab work done with [facility]. She is on simvastatin, tolerating well     Sjogren's syndrome  She tells me that she has sjogren and lupus. Previous lab work negative. She used to follow up with Dr. Steiner. Not able to tolerate biological, methotrexate. Stopped them. She has osteoarthritis, but no joint swelling or subluxation.     Right flank pain  At previous apt pt presented complaining right upper abdominal pain which was radiating into her right flank. this pain is being going on for more than 2 months. She has had an abdominal CT which showed pyelocaliectasis. Urine culture at her last appointment came back positive for Escherichia coli, sensitive to cipro. I treated patient with ciprofloxacin for 5 days. She felt somehow better however she still continued to have this persistent pain. Pain is worse if she lies down on her back. She reports that this is more of a muscle internal, sharp pain not her back pain. She has had back pain in the past but this is not the same. She is seeing GI and they are considering repeat colonoscopy. She is concerned for possible pancreatic cancer because her   of pancreatic cancer. She denies n/v. LFTs normal. CT abdomen negative for mass     Hyponatremia  See previous lab work. Asymptomatic     Chronic right-sided low back pain with sciatica  She reports that she had bad all low back pain in the possibly sciatic pain radiation. Stable, flank pain she is experiencing is not the same. She denies numbness or tingling        Current medicines (including changes today)  Current Outpatient Prescriptions   Medication Sig Dispense Refill   • ciprofloxacin (CIPRO) 500 MG Tab Take 1 Tab by mouth 2 times a day. 28 Tab 0   • coenzyme Q-10 30 MG capsule Take 60 mg by mouth every day.     •  Turmeric, Curcuma Longa, (CURCUMIN) Powder by Does not apply route.     • topiramate (TOPAMAX) 100 MG Tab Take 100 mg by mouth 2 times a day.     • albuterol 108 (90 BASE) MCG/ACT Aero Soln inhalation aerosol Inhale 2 Puffs by mouth every 6 hours as needed for Shortness of Breath. 8.5 g 1   • aspirin EC (ECOTRIN) 325 MG Tablet Delayed Response Take 1 Tab by mouth every day. 40 Tab 0   • GLUCOSAMINE-CHONDROITIN PO Take  by mouth.     • FOLIC ACID PO Take  by mouth every day.     • Hyoscyamine Sulfate (LEVSIN PO) Take  by mouth as needed.     • SIMVASTATIN PO Take 10 mg by mouth every evening.     • BIOTIN 5000 PO Take  by mouth every day.     • Multiple Vitamin (MULTIVITAMIN PO) Take  by mouth every day.     • naproxen (NAPROSYN) 375 MG TABS Take 375 mg by mouth 2 times a day, with meals.     • ciprofloxacin (CIPRO) 500 MG Tab Take 1 Tab by mouth 2 times a day. 10 Tab 0     No current facility-administered medications for this visit.     She  has a past medical history of Lupus (CMS-HCC); Sjogren's syndrome (CMS-[facility]); Fibromyalgia; Lichen planus; GERD (gastroesophageal reflux disease); Mitral valve prolapse; Arthritis; Kidney calculi; GERD (gastroesophageal reflux disease); Dental disorder; Snoring; CATARACT; Pain; Psychiatric problem; Shingles (10/4/11); Other convulsions; Allergy, unspecified not elsewhere classified; Anxiety; Unspecified asthma; Blood transfusion, without reported diagnosis; Depression; Headache; Headache, classical migraine; Osteoporosis, unspecified; Urinary tract infection, site not specified; and Migraine without aura, without mention of intractable migraine without mention of status migrainosus.    Current Outpatient Prescriptions   Medication Sig Dispense Refill   • ciprofloxacin (CIPRO) 500 MG Tab Take 1 Tab by mouth 2 times a day. 28 Tab 0   • coenzyme Q-10 30 MG capsule Take 60 mg by mouth every day.     • Turmeric, Curcuma Longa, (CURCUMIN) Powder by Does not apply route.     •  topiramate (TOPAMAX) 100 MG Tab Take 100 mg by mouth 2 times a day.     • albuterol 108 (90 BASE) MCG/ACT Aero Soln inhalation aerosol Inhale 2 Puffs by mouth every 6 hours as needed for Shortness of Breath. 8.5 g 1   • aspirin EC (ECOTRIN) 325 MG Tablet Delayed Response Take 1 Tab by mouth every day. 40 Tab 0   • GLUCOSAMINE-CHONDROITIN PO Take  by mouth.     • FOLIC ACID PO Take  by mouth every day.     • Hyoscyamine Sulfate (LEVSIN PO) Take  by mouth as needed.     • SIMVASTATIN PO Take 10 mg by mouth every evening.     • BIOTIN 5000 PO Take  by mouth every day.     • Multiple Vitamin (MULTIVITAMIN PO) Take  by mouth every day.     • naproxen (NAPROSYN) 375 MG TABS Take 375 mg by mouth 2 times a day, with meals.     • ciprofloxacin (CIPRO) 500 MG Tab Take 1 Tab by mouth 2 times a day. 10 Tab 0     No current facility-administered medications for this visit.       Allergies as of 08/03/2017 - Jason as Reviewed 08/03/2017   Allergen Reaction Noted   • Ciprofloxacin  06/13/2017   • Other drug  12/01/2010   • Sulfa drugs Hives 12/01/2010       Social History     Social History   • Marital Status:      Spouse Name: N/A   • Number of Children: N/A   • Years of Education: N/A     Occupational History   • Not on file.     Social History Main Topics   • Smoking status: Former Smoker -- 34 years     Quit date: 01/28/1978   • Smokeless tobacco: Former User     Quit date: 05/27/1978   • Alcohol Use: Yes      Comment: rarely   • Drug Use: No   • Sexual Activity: Not on file     Other Topics Concern   • Not on file     Social History Narrative        Family History   Problem Relation Age of Onset   • Arthritis Mother    • Cancer Father    • Heart Disease Father    • Hypertension Father    • Diabetes Maternal Aunt    • Cancer Maternal Uncle    • Heart Disease Maternal Grandmother    • Diabetes Maternal Grandfather    • Genetic Paternal Grandmother    • Heart Disease Paternal Grandfather    • Hyperlipidemia Paternal  "Grandfather    • Diabetes Maternal Aunt    • Genetic Maternal Uncle    • Cancer Paternal Aunt    • Cancer Paternal Aunt    • Cancer Paternal Uncle        Past Surgical History   Procedure Laterality Date   • Mastectomy  1972     b/l   • Mammoplasty augmentation  1988     removed, post op complications   • Other  2007     reconstruction of shoulder to cover chest wall   • Breast biopsy  1970's     x 5   • Other  1988     17 reconstruction surgeries post mammoplasty   • Shoulder decompression arthroscopic  10/6/2011     Performed by LARY CAR at SURGERY [facility]   • Clavicle distal excision  10/6/2011     Performed by LARY CAR at SURGERY [facility]   • Closed reduction  8/6/2012     Performed by PEDRO DELGADO at [facility]   • Hip cannulated screw  8/6/2012     Performed by [facility]RO [facility]ADO at [facility]   • Hip cannulated screw  1/28/2014     Performed by Pedro Delgado M.D. at SURGERY [facility] ORS   • Abdominal exploration     • Eye surgery     • Open reduction         ROS   All systems reviewed are negative except for HPI       Objective:     Blood pressure 108/70, pulse 86, temperature 36.9 °C (98.4 °F), resp. rate 16, height 1.626 m (5' 4\"), weight 50.803 kg (112 lb), SpO2 95 %, not currently breastfeeding. Body mass index is 19.22 kg/(m^2).   Physical Exam:  General: Alert and oriented, No apparent distress, frail.   Eyes: Pupils equal and reactive. No scleral icterus. EOMI   Throat: Clear no erythema or exudates noted. Lichen palnus . Oral mucosa moist, oral dental intact   Neck: Supple. No cervical or supraclavicular lymphadenopathy noted. Thyroid not enlarged.   Lungs: normal effort, Clear to auscultation   Cardiovascular: Regular rate and rhythm. No murmurs, rubs or gallops, pulses intact   Abdomen: Soft, +BS, some tenderness on the right side. No rebound or guarding noted. No hepato or splenomegaly . Gibson signs negative   Extremities: " No clubbing, cyanosis, edema.   Neuro: cranial nerves intact, sensation intact   Back she has positive CVA in the right flank   Skin: Clear. No rash or suspicious skin lesions noted.        Assessment and Plan:   The following treatment plan was discussed    1. Right flank pain  Possible persistent UTI?? UA repeat today in the clinic, inclocusive,   Will treat again with cipro for 14 days.  Abdominal US to follow  Consider MRI of her back if still persistent   - US-RENAL  - UA/M W/RFLX CULTURE, ROUTINE  - COMP METABOLIC PANEL; Future  - CBC WITH DIFFERENTIAL; Future  - POCT Urinalysis    2. Chronic right-sided low back pain with sciatica, sciatica laterality unspecified  Stable at this time  - COMP METABOLIC PANEL; Future  - CBC WITH DIFFERENTIAL; Future    3. Systemic lupus erythematosus, unspecified SLE type, unspecified organ involvement status (CMS-HCC)  4. Sjogren's syndrome, with unspecified organ involvement (CMS-HCC)  Reevaluate.   - SSA 52 AND 60 (RO)(JEANNA) AB, IGG; Future  - WESTERGREN SED RATE; Future  - CCP ANTIBODY; Future  - JOSÉ ANTONIO ANTIBODY WITH REFLEX; Future  - CRP QUANTITIVE (NON-CARDIAC); Future  - RHEUMATOID ARTHRITIS FACTOR; Future  - COMPLEMENT C3+C4 SERUM; Future  - WESTERGREN SED RATE; Future  - COMP METABOLIC PANEL; Future  - CBC WITH DIFFERENTIAL; Future      5. Right lateral abdominal pain  As per above   - COMP METABOLIC PANEL; Future  - CBC WITH DIFFERENTIAL; Future    6. Other and unspecified hyperlipidemia  Continue same treatment, lab work to get from previous physician   - COMP METABOLIC PANEL; Future  - CBC WITH DIFFERENTIAL; Future    7. Hyponatremia  Continue to monitor       Followup: Return in about 2 weeks (around 8/17/2017) for Long.

## 2017-08-04 NOTE — ASSESSMENT & PLAN NOTE
She tells me that she has sjogren and lupus. Previous lab work negative. She used to follow up with Dr. Steiner. Not able to tolerate biological, methotrexate. Stopped them. She has osteoarthritis, but no joint swelling or subluxation.

## 2017-08-04 NOTE — ASSESSMENT & PLAN NOTE
At previous apt pt presented complaining right upper abdominal pain which was radiating into her right flank. this pain is being going on for more than 2 months. She has had an abdominal CT which showed pyelocaliectasis. Urine culture at her last appointment came back positive for Escherichia coli, sensitive to cipro. I treated patient with ciprofloxacin for 5 days. She felt somehow better however she still continued to have this persistent pain. Pain is worse if she lies down on her back. She reports that this is more of a muscle internal, sharp pain not her back pain. She has had back pain in the past but this is not the same. She is seeing GI and they are considering repeat colonoscopy. She is concerned for possible pancreatic cancer because her   of pancreatic cancer. She denies n/v. LFTs normal. CT abdomen negative for mass

## 2017-08-05 LAB
CCP IGG SERPL-ACNC: 4 UNITS (ref 0–19)
NUCLEAR IGG SER QL IA: NORMAL
SSA52 R0ENA AB IGG Q0420: 3 AU/ML (ref 0–40)
SSA60 R0ENA AB IGG Q0419: 1 AU/ML (ref 0–40)

## 2017-08-07 ENCOUNTER — HOSPITAL ENCOUNTER (OUTPATIENT)
Dept: RADIOLOGY | Facility: MEDICAL CENTER | Age: 77
End: 2017-08-07
Attending: INTERNAL MEDICINE
Payer: MEDICARE

## 2017-08-07 PROCEDURE — 76775 US EXAM ABDO BACK WALL LIM: CPT

## 2017-08-08 ENCOUNTER — TELEPHONE (OUTPATIENT)
Dept: MEDICAL GROUP | Facility: PHYSICIAN GROUP | Age: 77
End: 2017-08-08

## 2017-08-08 NOTE — TELEPHONE ENCOUNTER
Called and spoke with patient regarding lab results. Patient had no questions at this time. CAMILLE

## 2017-08-08 NOTE — TELEPHONE ENCOUNTER
----- Message from Migdalia Hollis M.D. sent at 8/8/2017 10:31 AM PDT -----  Please let patient know that her lab results look good overall. Rheumatology tests were normal.  Dr. Hollis covering for Dr. Coronel

## 2017-08-08 NOTE — TELEPHONE ENCOUNTER
----- Message from Migdalia Hollis M.D. sent at 8/8/2017  7:07 AM PDT -----  Please let patient know that her ultrasound showed minimal enlargement of where the urine empties out of her kidneys bilaterally. It looks to be improving from her previous CT scan and this is not dangerous. I recommend that she keep her follow-up appointment so this can be discussed in depth.  Migdalia Hollis M.D.

## 2017-08-10 ENCOUNTER — APPOINTMENT (OUTPATIENT)
Dept: MEDICAL GROUP | Facility: PHYSICIAN GROUP | Age: 77
End: 2017-08-10
Payer: MEDICARE

## 2017-08-29 ENCOUNTER — APPOINTMENT (OUTPATIENT)
Dept: MEDICAL GROUP | Facility: PHYSICIAN GROUP | Age: 77
End: 2017-08-29
Payer: MEDICARE

## 2017-09-01 ENCOUNTER — HOSPITAL ENCOUNTER (EMERGENCY)
Facility: MEDICAL CENTER | Age: 77
End: 2017-09-01
Attending: EMERGENCY MEDICINE
Payer: MEDICARE

## 2017-09-01 ENCOUNTER — TELEPHONE (OUTPATIENT)
Dept: MEDICAL GROUP | Facility: PHYSICIAN GROUP | Age: 77
End: 2017-09-01

## 2017-09-01 VITALS
HEART RATE: 61 BPM | HEIGHT: 64 IN | WEIGHT: 114.64 LBS | BODY MASS INDEX: 19.57 KG/M2 | DIASTOLIC BLOOD PRESSURE: 86 MMHG | RESPIRATION RATE: 20 BRPM | SYSTOLIC BLOOD PRESSURE: 178 MMHG | OXYGEN SATURATION: 97 % | TEMPERATURE: 99.1 F

## 2017-09-01 DIAGNOSIS — M54.42 ACUTE RIGHT-SIDED LOW BACK PAIN WITH BILATERAL SCIATICA: ICD-10-CM

## 2017-09-01 DIAGNOSIS — M54.41 ACUTE RIGHT-SIDED LOW BACK PAIN WITH BILATERAL SCIATICA: ICD-10-CM

## 2017-09-01 LAB
ALBUMIN SERPL BCP-MCNC: 4 G/DL (ref 3.2–4.9)
ALBUMIN/GLOB SERPL: 1.5 G/DL
ALP SERPL-CCNC: 53 U/L (ref 30–99)
ALT SERPL-CCNC: 16 U/L (ref 2–50)
ANION GAP SERPL CALC-SCNC: 7 MMOL/L (ref 0–11.9)
AST SERPL-CCNC: 27 U/L (ref 12–45)
BASOPHILS # BLD AUTO: 0.6 % (ref 0–1.8)
BASOPHILS # BLD: 0.03 K/UL (ref 0–0.12)
BILIRUB SERPL-MCNC: 0.8 MG/DL (ref 0.1–1.5)
BUN SERPL-MCNC: 16 MG/DL (ref 8–22)
CALCIUM SERPL-MCNC: 9.1 MG/DL (ref 8.4–10.2)
CHLORIDE SERPL-SCNC: 97 MMOL/L (ref 96–112)
CO2 SERPL-SCNC: 23 MMOL/L (ref 20–33)
CREAT SERPL-MCNC: 0.74 MG/DL (ref 0.5–1.4)
EOSINOPHIL # BLD AUTO: 0.16 K/UL (ref 0–0.51)
EOSINOPHIL NFR BLD: 3.3 % (ref 0–6.9)
ERYTHROCYTE [DISTWIDTH] IN BLOOD BY AUTOMATED COUNT: 38.7 FL (ref 35.9–50)
GFR SERPL CREATININE-BSD FRML MDRD: >60 ML/MIN/1.73 M 2
GLOBULIN SER CALC-MCNC: 2.7 G/DL (ref 1.9–3.5)
GLUCOSE SERPL-MCNC: 84 MG/DL (ref 65–99)
HCT VFR BLD AUTO: 36.9 % (ref 37–47)
HGB BLD-MCNC: 12.9 G/DL (ref 12–16)
IMM GRANULOCYTES # BLD AUTO: 0.01 K/UL (ref 0–0.11)
IMM GRANULOCYTES NFR BLD AUTO: 0.2 % (ref 0–0.9)
LYMPHOCYTES # BLD AUTO: 1.55 K/UL (ref 1–4.8)
LYMPHOCYTES NFR BLD: 31.8 % (ref 22–41)
MCH RBC QN AUTO: 31.5 PG (ref 27–33)
MCHC RBC AUTO-ENTMCNC: 35 G/DL (ref 33.6–35)
MCV RBC AUTO: 90 FL (ref 81.4–97.8)
MONOCYTES # BLD AUTO: 0.45 K/UL (ref 0–0.85)
MONOCYTES NFR BLD AUTO: 9.2 % (ref 0–13.4)
NEUTROPHILS # BLD AUTO: 2.68 K/UL (ref 2–7.15)
NEUTROPHILS NFR BLD: 54.9 % (ref 44–72)
NRBC # BLD AUTO: 0 K/UL
NRBC BLD AUTO-RTO: 0 /100 WBC
PLATELET # BLD AUTO: 196 K/UL (ref 164–446)
PMV BLD AUTO: 9 FL (ref 9–12.9)
POTASSIUM SERPL-SCNC: 3.6 MMOL/L (ref 3.6–5.5)
PROT SERPL-MCNC: 6.7 G/DL (ref 6–8.2)
RBC # BLD AUTO: 4.1 M/UL (ref 4.2–5.4)
SODIUM SERPL-SCNC: 127 MMOL/L (ref 135–145)
WBC # BLD AUTO: 4.9 K/UL (ref 4.8–10.8)

## 2017-09-01 PROCEDURE — 85025 COMPLETE CBC W/AUTO DIFF WBC: CPT

## 2017-09-01 PROCEDURE — 83970 ASSAY OF PARATHORMONE: CPT

## 2017-09-01 PROCEDURE — 80074 ACUTE HEPATITIS PANEL: CPT

## 2017-09-01 PROCEDURE — 99284 EMERGENCY DEPT VISIT MOD MDM: CPT

## 2017-09-01 PROCEDURE — 94760 N-INVAS EAR/PLS OXIMETRY 1: CPT

## 2017-09-01 PROCEDURE — 80053 COMPREHEN METABOLIC PANEL: CPT

## 2017-09-01 PROCEDURE — 82330 ASSAY OF CALCIUM: CPT

## 2017-09-01 PROCEDURE — 36415 COLL VENOUS BLD VENIPUNCTURE: CPT

## 2017-09-01 PROCEDURE — 80048 BASIC METABOLIC PNL TOTAL CA: CPT

## 2017-09-01 ASSESSMENT — PAIN SCALES - GENERAL: PAINLEVEL_OUTOF10: 5

## 2017-09-02 ENCOUNTER — PATIENT OUTREACH (OUTPATIENT)
Dept: HEALTH INFORMATION MANAGEMENT | Facility: OTHER | Age: 77
End: 2017-09-02

## 2017-09-02 LAB
CA-I SERPL-SCNC: 1.18 MMOL/L (ref 1.1–1.3)
PTH-INTACT SERPL-MCNC: 49.6 PG/ML (ref 14–72)

## 2017-09-02 NOTE — DISCHARGE INSTRUCTIONS
Please follow-up with your primary care provider for blood pressure management.    Chronic Back Pain   When back pain lasts longer than 3 months, it is called chronic back pain. People with chronic back pain often go through certain periods that are more intense (flare-ups).   CAUSES  Chronic back pain can be caused by wear and tear (degeneration) on different structures in your back. These structures include:  · The bones of your spine (vertebrae) and the joints surrounding your spinal cord and nerve roots (facets).  · The strong, fibrous tissues that connect your vertebrae (ligaments).  Degeneration of these structures may result in pressure on your nerves. This can lead to constant pain.  HOME CARE INSTRUCTIONS  · Avoid bending, heavy lifting, prolonged sitting, and activities which make the problem worse.  · Take brief periods of rest throughout the day to reduce your pain. Lying down or standing usually is better than sitting while you are resting.  · Take over-the-counter or prescription medicines only as directed by your caregiver.  SEEK IMMEDIATE MEDICAL CARE IF:   · You have weakness or numbness in one of your legs or feet.  · You have trouble controlling your bladder or bowels.  · You have nausea, vomiting, abdominal pain, shortness of breath, or fainting.     This information is not intended to replace advice given to you by your health care provider. Make sure you discuss any questions you have with your health care provider.     Document Released: 01/25/2006 Document Revised: 03/11/2013 Document Reviewed: 12/01/2012  [employer] Interactive Patient Education ©2016 [employer]

## 2017-09-02 NOTE — ED PROVIDER NOTES
ED Provider Note    CHIEF COMPLAINT  Chief Complaint   Patient presents with   • Sent by MD   • Abnormal Labs         HPI  Mariama Gonzales is a 77 y.o. female who presents to the knee secondary to lab abnormalities. Patient states that she followed up with her primary care physician got blood tests done and reported calcium of 6.8 along with renal insufficiency. The patient says she's been feeling well except for the last 2-3 months patient has been having right lower back pain, worse with touch worse with movement. Patient was on 2 different antibiotics. She also had multiple CAT scans done. Patient denies any numbness, tingling, weakness, chest pain, shortness breath, abdominal pains, nausea vomiting.    REVIEW OF SYSTEMS  See HPI for further details. All other systems reviewed and are negative.     PAST MEDICAL HISTORY  Past Medical History:   Diagnosis Date   • Shingles 10/4/11    dr velasco aware of this back of head   • Allergy, unspecified not elsewhere classified    • Anxiety    • Arthritis    • Blood transfusion, without reported diagnosis    • CATARACT    • Dental disorder     lichen planus   • Depression    • Fibromyalgia    • GERD (gastroesophageal reflux disease)    • GERD (gastroesophageal reflux disease)    • Headache    • Headache, classical migraine    • Kidney calculi    • Lichen planus    • Lupus (CMS-HCC)    • Migraine without aura, without mention of intractable migraine without mention of status migrainosus    • Mitral valve prolapse    • Osteoporosis, unspecified    • Other convulsions     Isolated seizure March, 2014   • Pain     joints, muscles   • Psychiatric problem     depression   • Sjogren's syndrome (CMS-HCC)    • Snoring    • Unspecified asthma    • Urinary tract infection, site not specified        FAMILY HISTORY  Family History   Problem Relation Age of Onset   • Arthritis Mother    • Cancer Father    • Heart Disease Father    • Hypertension Father    • Diabetes Maternal Aunt    • Cancer  Maternal Uncle    • Heart Disease Maternal Grandmother    • Diabetes Maternal Grandfather    • Genetic Paternal Grandmother    • Heart Disease Paternal Grandfather    • Hyperlipidemia Paternal Grandfather    • Diabetes Maternal Aunt    • Genetic Maternal Uncle    • Cancer Paternal Aunt    • Cancer Paternal Aunt    • Cancer Paternal Uncle        SOCIAL HISTORY  Social History     Social History   • Marital status:      Spouse name: N/A   • Number of children: N/A   • Years of education: N/A     Social History Main Topics   • Smoking status: Former Smoker     Years: 34.00     Quit date: 1/28/1978   • Smokeless tobacco: Former User     Quit date: 5/27/1978   • Alcohol use Yes      Comment: Rarely   • Drug use: No   • Sexual activity: Not on file     Other Topics Concern   • Not on file     Social History Narrative   • No narrative on file       SURGICAL HISTORY  Past Surgical History:   Procedure Laterality Date   • HIP CANNULATED SCREW  1/28/2014    Performed by Cristian Delgado M.D. at SURGERY [facility]   • CLOSED REDUCTION  8/6/2012    Performed by CRISTIAN DELGADO at SURGERY [facility]   • HIP CANNULATED SCREW  8/6/2012    Performed by CRISTIAN DELGADO at SURGERY [facility]   • SHOULDER DECOMPRESSION ARTHROSCOPIC  10/6/2011    Performed by LARY CAR at SURGERY [facility]   • CLAVICLE DISTAL EXCISION  10/6/2011    Performed by LARY CAR at [facility]   • OTHER  2007    reconstruction of shoulder to cover chest wall   • MAMMOPLASTY AUGMENTATION  1988    removed, post op complications   • OTHER  1988    17 reconstruction surgeries post mammoplasty   • MASTECTOMY  1972    b/l   • BREAST BIOPSY  1970's    x 5   • ABDOMINAL EXPLORATION     • EYE SURGERY     • OPEN REDUCTION         CURRENT MEDICATIONS  Home Medications     Reviewed by Eugene Song R.N. (Registered Nurse) on 09/01/17 at 1933  Med List Status: Complete   Medication Last Dose Status  "  albuterol 108 (90 BASE) MCG/ACT Aero Soln inhalation aerosol  Active   aspirin EC (ECOTRIN) 325 MG Tablet Delayed Response  Active   BIOTIN 5000 PO 8/18/2017 Active   ciprofloxacin (CIPRO) 500 MG Tab 8/25/2017 Active   coenzyme Q-10 30 MG capsule 8/25/2017 Active   FOLIC ACID PO 8/18/2017 Active   GLUCOSAMINE-CHONDROITIN PO  Active   Hyoscyamine Sulfate (LEVSIN PO) 8/27/2017 Active   Multiple Vitamin (MULTIVITAMIN PO)  Active   naproxen (NAPROSYN) 375 MG TABS 9/1/2017 Active   SIMVASTATIN PO 8/1/2017 Active   topiramate (TOPAMAX) 100 MG Tab 8/11/2017 Active   Turmeric, Curcuma Longa, (CURCUMIN) Powder  Active                ALLERGIES  Allergies   Allergen Reactions   • Ciprofloxacin    • Other Drug      \"tumor necrosis factor drugs\" infections   • Sulfa Drugs Hives       PHYSICAL EXAM  VITAL SIGNS: BP (!) 178/86   Pulse 71   Temp 37.3 °C (99.1 °F)   Resp 20   Ht 1.626 m (5' 4\") Comment: Stated  Wt 52 kg (114 lb 10.2 oz)   SpO2 96%   BMI 19.68 kg/m²   Constitutional: Well developed, Well nourished, no distress, Non-toxic appearance.   HENT: Normocephalic, Atraumatic, Bilateral external ears normal, Oropharynx moist, No oral exudates.   Eyes: PERRLA, EOMI, Conjunctiva normal, No discharge.   Neck: No tenderness, Supple, No stridor.   Lymphatic: No lymphadenopathy noted.   Cardiovascular: Normal heart rate, Normal rhythm.   Thorax & Lungs: Clear to auscultation bilaterally, No respiratory distress, No wheezing, No crackles.   Abdomen: Soft, No tenderness, No masses, No pulsatile masses.   Skin: Warm, Dry, No erythema, No rash.   Back: Right-sided paraspinal tenderness to palpation, no rashes seen  Extremities: Intact distal pulses, No edema, No tenderness, No cyanosis.   Musculoskeletal: No tenderness to palpation or major deformities noted.   Neurologic: Awake, alert. Moves all extremities spontaneously.  Psychiatric: Affect normal, Judgment normal, Mood normal.     Results for orders placed or performed " during the hospital encounter of 09/01/17   CBC WITH DIFFERENTIAL   Result Value Ref Range    WBC 4.9 4.8 - 10.8 K/uL    RBC 4.10 (L) 4.20 - 5.40 M/uL    Hemoglobin 12.9 12.0 - 16.0 g/dL    Hematocrit 36.9 (L) 37.0 - 47.0 %    MCV 90.0 81.4 - 97.8 fL    MCH 31.5 27.0 - 33.0 pg    MCHC 35.0 33.6 - 35.0 g/dL    RDW 38.7 35.9 - 50.0 fL    Platelet Count 196 164 - 446 K/uL    MPV 9.0 9.0 - 12.9 fL    Neutrophils-Polys 54.90 44.00 - 72.00 %    Lymphocytes 31.80 22.00 - 41.00 %    Monocytes 9.20 0.00 - 13.40 %    Eosinophils 3.30 0.00 - 6.90 %    Basophils 0.60 0.00 - 1.80 %    Immature Granulocytes 0.20 0.00 - 0.90 %    Nucleated RBC 0.00 /100 WBC    Neutrophils (Absolute) 2.68 2.00 - 7.15 K/uL    Lymphs (Absolute) 1.55 1.00 - 4.80 K/uL    Monos (Absolute) 0.45 0.00 - 0.85 K/uL    Eos (Absolute) 0.16 0.00 - 0.51 K/uL    Baso (Absolute) 0.03 0.00 - 0.12 K/uL    Immature Granulocytes (abs) 0.01 0.00 - 0.11 K/uL    NRBC (Absolute) 0.00 K/uL   COMP METABOLIC PANEL   Result Value Ref Range    Sodium 127 (L) 135 - 145 mmol/L    Potassium 3.6 3.6 - 5.5 mmol/L    Chloride 97 96 - 112 mmol/L    Co2 23 20 - 33 mmol/L    Anion Gap 7.0 0.0 - 11.9    Glucose 84 65 - 99 mg/dL    Bun 16 8 - 22 mg/dL    Creatinine 0.74 0.50 - 1.40 mg/dL    Calcium 9.1 8.4 - 10.2 mg/dL    AST(SGOT) 27 12 - 45 U/L    ALT(SGPT) 16 2 - 50 U/L    Alkaline Phosphatase 53 30 - 99 U/L    Total Bilirubin 0.8 0.1 - 1.5 mg/dL    Albumin 4.0 3.2 - 4.9 g/dL    Total Protein 6.7 6.0 - 8.2 g/dL    Globulin 2.7 1.9 - 3.5 g/dL    A-G Ratio 1.5 g/dL   PTH WITH IONIZED CALCIUM   Result Value Ref Range    Ionized Calcium 1.18 1.10 - 1.30 mmol/L   ESTIMATED GFR   Result Value Ref Range    GFR If African American >60 >60 mL/min/1.73 m 2    GFR If Non African American >60 >60 mL/min/1.73 m 2        COURSE & MEDICAL DECISION MAKING  Pertinent Labs & Imaging studies reviewed. (See chart for details)  Patient's family in secondary to a reported creatinine of 1.6 and a calcium of  6.8, repeat blood tests here show the patient's kidney function as well at 0.74 and calcium is normal at 9.1 including a normal ionized calcium. I believe the patient has right-sided low back pain. Patient will be discharged home, follow-up with the primary care physician.    Further history is obtained and apparently the patient did not get her blood drawn today. There is a results in the computer under her chart from 1355 stating that she has kidney and calcium abnormalities. This apparently was a entry error in her chart.      FINAL IMPRESSION  1. Acute right-sided low back pain with bilateral sciatica        Patient referred to primary care provider for blood pressure management     This dictation was created using voice recognition software. The accuracy of the dictation is limited to the abilities of the software. I expect there may be some errors of grammar and possibly content. The nursing notes were reviewed and certain aspects of this information were incorporated into this note.     Electronically signed by: Amarjit Khanna, 9/1/2017 8:31 PM

## 2017-09-02 NOTE — TELEPHONE ENCOUNTER
Ca is too low, 6.8. No albumin level, kidney function is worse. I would like her to be evaluated to ER for albumin level, making sure she does not need IV calcium. She has some leg swelling, but otherwise no other symptoms  Todd Coronel M.D.

## 2017-09-06 ENCOUNTER — TELEPHONE (OUTPATIENT)
Dept: MEDICAL GROUP | Facility: PHYSICIAN GROUP | Age: 77
End: 2017-09-06

## 2017-09-06 NOTE — TELEPHONE ENCOUNTER
ESTABLISHED PATIENT PRE-VISIT PLANNING     Note: Patient will not be contacted if there is no indication to call.     1.  Reviewed notes from the last few office visits within the medical group: Yes    2.  If any orders were placed at last visit or intended to be done for this visit (i.e. 6 mos follow-up), do we have Results/Consult Notes?        •  Labs - Labs ordered, completed and results are in chart.       •  Imaging - Imaging was not ordered at last office visit.       •  Referrals - No referrals were ordered at last office visit.    3. Is this appointment scheduled as a Hospital Follow-Up? No    4.  Immunizations were updated in Epic using WebIZ?: Epic matches WebIZ       •  Web Iz Recommendations: FLU, HEPATITIS A , HEPATITIS B and TDAP    5.  Patient is due for the following Health Maintenance Topics:   Health Maintenance Due   Topic Date Due   • Annual Wellness Visit  1940   • PAP SMEAR  06/27/1962   • COLONOSCOPY  06/27/1991   • IMM DTaP/Tdap/Td Vaccine (1 - Tdap) 08/07/2010   • MAMMOGRAM  08/26/2014   • BONE DENSITY  12/03/2015   • IMM INFLUENZA (1) 09/01/2017       - Patient has completed FLU, HEPATITIS A , HEPATITIS B, PNEUMOVAX (PPSV23), PREVNAR (PCV13) , TD and ZOSTAVAX (Shingles) Immunization(s) per WebIZ. Chart has been updated.      6.  Patient was NOT informed to arrive 15 min prior to their scheduled appointment and bring in their medication bottles.

## 2017-09-07 ENCOUNTER — APPOINTMENT (OUTPATIENT)
Dept: MEDICAL GROUP | Facility: PHYSICIAN GROUP | Age: 77
End: 2017-09-07
Payer: MEDICARE

## 2017-09-08 ENCOUNTER — TELEPHONE (OUTPATIENT)
Dept: MEDICAL GROUP | Facility: PHYSICIAN GROUP | Age: 77
End: 2017-09-08

## 2017-09-08 LAB
ANION GAP SERPL CALC-SCNC: NORMAL MMOL/L (ref 0–11.9)
BUN SERPL-MCNC: NORMAL MG/DL (ref 8–22)
CALCIUM SERPL-MCNC: NORMAL MG/DL (ref 8.5–10.5)
CHLORIDE SERPL-SCNC: NORMAL MMOL/L (ref 96–112)
CO2 SERPL-SCNC: NORMAL MMOL/L (ref 20–33)
CREAT SERPL-MCNC: NORMAL MG/DL (ref 0.5–1.4)
GFR SERPL CREATININE-BSD FRML MDRD: NORMAL ML/MIN/1.73 M 2
GLUCOSE SERPL-MCNC: NORMAL MG/DL (ref 65–99)
HAV IGM SERPL QL IA: NORMAL
HBV CORE IGM SER QL: NORMAL
HBV SURFACE AG SER QL: NORMAL
HCV AB SER QL: NORMAL
POTASSIUM SERPL-SCNC: NORMAL MMOL/L (ref 3.6–5.5)
SODIUM SERPL-SCNC: NORMAL MMOL/L (ref 135–145)

## 2017-09-08 NOTE — TELEPHONE ENCOUNTER
----- Message from Todd Coronel M.D. sent at 9/8/2017  4:07 PM PDT -----  Please let the patient know that the labs look good. We can discuss at her next appointment. Thank you  Todd Coronel M.D.

## 2017-09-08 NOTE — PROGRESS NOTES
Please let the patient know that the labs look good. We can discuss at her next appointment. Thank you  Todd Coronel M.D.

## 2017-09-11 ENCOUNTER — TELEPHONE (OUTPATIENT)
Dept: MEDICAL GROUP | Facility: PHYSICIAN GROUP | Age: 77
End: 2017-09-11

## 2017-09-18 ENCOUNTER — APPOINTMENT (OUTPATIENT)
Dept: MEDICAL GROUP | Facility: PHYSICIAN GROUP | Age: 77
End: 2017-09-18
Payer: MEDICARE

## 2017-09-26 ENCOUNTER — APPOINTMENT (OUTPATIENT)
Dept: MEDICAL GROUP | Facility: PHYSICIAN GROUP | Age: 77
End: 2017-09-26
Payer: MEDICARE

## 2017-10-02 ENCOUNTER — TELEPHONE (OUTPATIENT)
Dept: MEDICAL GROUP | Facility: PHYSICIAN GROUP | Age: 77
End: 2017-10-02

## 2017-10-02 NOTE — TELEPHONE ENCOUNTER
ESTABLISHED PATIENT PRE-VISIT PLANNING     Note: Patient will not be contacted if there is no indication to call.     1.  Reviewed notes from the last few office visits within the medical group: Yes    2.  If any orders were placed at last visit or intended to be done for this visit (i.e. 6 mos follow-up), do we have Results/Consult Notes?        •  Labs - Labs ordered, completed on 09/01/17 and results are in chart.       •  Imaging - Imaging ordered, completed and results are in chart.       •  Referrals - No referrals were ordered at last office visit.    3. Is this appointment scheduled as a Hospital Follow-Up? No    4.  Immunizations were updated in Epic using WebIZ?: Epic matches WebIZ       •  Web Iz Recommendations: FLU, HEPATITIS A , HEPATITIS B and TDAP    5.  Patient is due for the following Health Maintenance Topics:   Health Maintenance Due   Topic Date Due   • Annual Wellness Visit  1940   • PAP SMEAR  06/27/1962   • COLONOSCOPY  06/27/1991   • IMM DTaP/Tdap/Td Vaccine (1 - Tdap) 08/07/2010   • MAMMOGRAM  08/26/2014   • BONE DENSITY  12/03/2015   • IMM INFLUENZA (1) 09/01/2017       - Patient has completed FLU, HEPATITIS A , HEPATITIS B, PNEUMOVAX (PPSV23), PREVNAR (PCV13) , TD and ZOSTAVAX (Shingles) Immunization(s) per WebIZ. Chart has been updated.      6.  Patient was NOT informed to arrive 15 min prior to their scheduled appointment and bring in their medication bottles.

## 2017-10-03 ENCOUNTER — APPOINTMENT (OUTPATIENT)
Dept: MEDICAL GROUP | Facility: PHYSICIAN GROUP | Age: 77
End: 2017-10-03
Payer: MEDICARE

## 2017-10-31 ENCOUNTER — APPOINTMENT (OUTPATIENT)
Dept: MEDICAL GROUP | Facility: PHYSICIAN GROUP | Age: 77
End: 2017-10-31
Payer: MEDICARE

## 2017-11-02 ENCOUNTER — TELEPHONE (OUTPATIENT)
Dept: MEDICAL GROUP | Facility: PHYSICIAN GROUP | Age: 77
End: 2017-11-02

## 2017-11-02 NOTE — TELEPHONE ENCOUNTER
ESTABLISHED PATIENT PRE-VISIT PLANNING     Note: Patient will not be contacted if there is no indication to call.     1.  Reviewed notes from the last few office visits within the medical group: Yes    2.  If any orders were placed at last visit or intended to be done for this visit (i.e. 6 mos follow-up), do we have Results/Consult Notes?        •  Labs - Labs ordered, completed on 09/01/17 and results are in chart.   Note: If patient appointment is for lab review and patient did not complete labs,                check with provider if OK to reschedule patient until labs completed.       •  Imaging - Imaging ordered, completed and results are in chart.       •  Referrals - No referrals were ordered at last office visit.    3. Is this appointment scheduled as a Hospital Follow-Up? No    4.  Immunizations were updated in Digital Reasoning using WebIZ?: Yes       •  Web Iz Recommendations: HEPATITIS A , HEPATITIS B and TDAP    5.  Patient is due for the following Health Maintenance Topics:   Health Maintenance Due   Topic Date Due   • Annual Wellness Visit  1940   • PAP SMEAR  06/27/1962   • COLONOSCOPY  06/27/1991   • IMM DTaP/Tdap/Td Vaccine (1 - Tdap) 08/07/2010   • MAMMOGRAM  08/26/2014   • BONE DENSITY  12/03/2015   • IMM INFLUENZA (1) 09/01/2017       - Patient has completed FLU, HEPATITIS A , HEPATITIS B, TD and ZOSTAVAX (Shingles) Immunization(s) per WebIZ. Chart has been updated.      6.  Patient was NOT informed to arrive 15 min prior to their scheduled appointment and bring in their medication bottles.

## 2017-11-03 ENCOUNTER — OFFICE VISIT (OUTPATIENT)
Dept: MEDICAL GROUP | Facility: PHYSICIAN GROUP | Age: 77
End: 2017-11-03
Payer: MEDICARE

## 2017-11-03 VITALS
TEMPERATURE: 99.2 F | BODY MASS INDEX: 20.14 KG/M2 | DIASTOLIC BLOOD PRESSURE: 76 MMHG | HEIGHT: 64 IN | WEIGHT: 118 LBS | OXYGEN SATURATION: 94 % | HEART RATE: 71 BPM | SYSTOLIC BLOOD PRESSURE: 126 MMHG | RESPIRATION RATE: 14 BRPM

## 2017-11-03 DIAGNOSIS — K22.70 BARRETT'S ESOPHAGUS WITHOUT DYSPLASIA: ICD-10-CM

## 2017-11-03 DIAGNOSIS — E87.1 HYPONATREMIA: ICD-10-CM

## 2017-11-03 DIAGNOSIS — D72.819 LEUKOPENIA, UNSPECIFIED TYPE: ICD-10-CM

## 2017-11-03 DIAGNOSIS — G89.29 CHRONIC RIGHT-SIDED LOW BACK PAIN WITH SCIATICA, SCIATICA LATERALITY UNSPECIFIED: ICD-10-CM

## 2017-11-03 DIAGNOSIS — E55.9 VITAMIN D DEFICIENCY: ICD-10-CM

## 2017-11-03 DIAGNOSIS — R10.9 RIGHT LATERAL ABDOMINAL PAIN: ICD-10-CM

## 2017-11-03 DIAGNOSIS — R10.9 RIGHT FLANK PAIN: ICD-10-CM

## 2017-11-03 DIAGNOSIS — G43.019 INTRACTABLE MIGRAINE WITHOUT AURA AND WITHOUT STATUS MIGRAINOSUS: ICD-10-CM

## 2017-11-03 DIAGNOSIS — M54.40 CHRONIC RIGHT-SIDED LOW BACK PAIN WITH SCIATICA, SCIATICA LATERALITY UNSPECIFIED: ICD-10-CM

## 2017-11-03 DIAGNOSIS — M81.8 OTHER OSTEOPOROSIS WITHOUT CURRENT PATHOLOGICAL FRACTURE: ICD-10-CM

## 2017-11-03 DIAGNOSIS — L43.9 LICHEN PLANUS: ICD-10-CM

## 2017-11-03 DIAGNOSIS — M35.00 SJOGREN'S SYNDROME, WITH UNSPECIFIED ORGAN INVOLVEMENT (HCC): ICD-10-CM

## 2017-11-03 DIAGNOSIS — M32.9 SYSTEMIC LUPUS ERYTHEMATOSUS, UNSPECIFIED SLE TYPE, UNSPECIFIED ORGAN INVOLVEMENT STATUS (HCC): ICD-10-CM

## 2017-11-03 DIAGNOSIS — J45.20 MILD INTERMITTENT REACTIVE AIRWAY DISEASE WITHOUT COMPLICATION: ICD-10-CM

## 2017-11-03 DIAGNOSIS — I05.9 MITRAL VALVE DISORDER: ICD-10-CM

## 2017-11-03 DIAGNOSIS — E78.2 MIXED HYPERLIPIDEMIA: ICD-10-CM

## 2017-11-03 DIAGNOSIS — I35.9 AORTIC VALVE DISEASE: ICD-10-CM

## 2017-11-03 PROBLEM — J45.909 REACTIVE AIRWAY DISEASE WITHOUT COMPLICATION: Status: RESOLVED | Noted: 2017-06-29 | Resolved: 2017-11-03

## 2017-11-03 PROCEDURE — 99215 OFFICE O/P EST HI 40 MIN: CPT | Performed by: INTERNAL MEDICINE

## 2017-11-03 RX ORDER — TOPIRAMATE 100 MG/1
100 TABLET, FILM COATED ORAL 2 TIMES DAILY
Qty: 180 TAB | Refills: 3 | Status: SHIPPED | OUTPATIENT
Start: 2017-11-03 | End: 2018-05-17 | Stop reason: SDUPTHER

## 2017-11-03 NOTE — PROGRESS NOTES
Subjective:   Mariama Gonzales is a 77 y.o. female here today for lab work review, back pain     Vitamin D deficiency  She is on supplement. Continue to monitor    Sjogren's syndrome  She is being evaluated by Dr. Steiner,  and  Vahid in the past. She tells me that twice JOSÉ ANTONIO has been positive. All sjogren markers, JOSÉ ANTONIO, RF, CCP, Sed rate negative. She reports that at some point she had joint swelling, not able to open anything. She has dry mouth, dry eyes, aphthous lesion and lichen panus on her mouth. She was told to use tumeric. She had refused DMAR treatment. From lab work in the system, all lab work is negative in the past. She describes herself stable at this time     Reactive airway disease without complication  Chronic, she uses albuterol as needed.    Other osteoporosis without current pathological fracture  She has had a couple pathological fracture. She has osteoporosis. She cannot tolerate infusion therapy or oral bisphosphonates. Advised to continue vitamin D and weightbearing walking. She does not want to continue monitoring DEXA scan, because she is not going to take medication for that     Mitral valve disorder  History of mitral valve prolapse. She denies palpitation, chest pain, shortness of breath. Echocardiogram with Dr. Grimaldo at [facility] without aura  She tells me that she has migraines headache. She is on Topamax for prevention. She has had botox injections, but does not work for her.     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 Stable     Leukopenia  Chronic , up and down. No explanation, possible due to Sjogren ???. Likely reactive     Hyponatremia  Chronic. She drinks more then a gallon of water a day. Euvolemic.     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 CT scan showed mild left pelviectasis. Renal US showed mild dilation. She tells me that pain is  resolved, she still continues to have back pain, but it is due to chronic pain, with sciatic nerve radiation . She used to follow up with pain specialist . She is going to try physical therapy. Advised patient if pain is persistent and is not getting better to let us know. consider an MRI evaluation    Gtz's esophagus without dysplasia  Not on PPI. Follow up with GI    Aortic valve disease  Mild AI, echo with Dr. Scherer, at [facility] [place]       Current medicines (including changes today)  Current Outpatient Prescriptions   Medication Sig Dispense Refill   • topiramate (TOPAMAX) 100 MG Tab Take 1 Tab by mouth 2 times a day. 180 Tab 3   • coenzyme Q-10 30 MG capsule Take 60 mg by mouth every day.     • Turmeric, Curcuma Longa, (CURCUMIN) Powder by Does not apply route.     • albuterol 108 (90 BASE) MCG/ACT Aero Soln inhalation aerosol Inhale 2 Puffs by mouth every 6 hours as needed for Shortness of Breath. 8.5 g 1   • aspirin EC (ECOTRIN) 325 MG Tablet Delayed Response Take 1 Tab by mouth every day. 40 Tab 0   • GLUCOSAMINE-CHONDROITIN PO Take  by mouth.     • FOLIC ACID PO Take  by mouth every day.     • Hyoscyamine Sulfate (LEVSIN PO) Take  by mouth as needed.     • SIMVASTATIN PO Take 10 mg by mouth every evening.     • BIOTIN 5000 PO Take  by mouth every day.     • Multiple Vitamin (MULTIVITAMIN PO) Take  by mouth every day.     • naproxen (NAPROSYN) 375 MG TABS Take 375 mg by mouth 2 times a day, with meals.       No current facility-administered medications for this visit.      She  has a past medical history of Allergy, unspecified not elsewhere classified; Anxiety; Arthritis; Blood transfusion, without reported diagnosis; CATARACT; Dental disorder; Depression; Fibromyalgia; GERD (gastroesophageal reflux disease); GERD (gastroesophageal reflux disease); Headache(784.0); Headache, classical migraine; Kidney calculi; Lichen planus; Lupus; Migraine without aura, without mention of intractable migraine without mention  of status migrainosus; Mitral valve prolapse; Osteoporosis, unspecified; Other convulsions; Pain; Psychiatric problem; Shingles (10/4/11); Sjogren's syndrome (CMS-[facility]); Snoring; Unspecified asthma(493.90); and Urinary tract infection, site not specified.    Current Outpatient Prescriptions   Medication Sig Dispense Refill   • topiramate (TOPAMAX) 100 MG Tab Take 1 Tab by mouth 2 times a day. 180 Tab 3   • coenzyme Q-10 30 MG capsule Take 60 mg by mouth every day.     • Turmeric, Curcuma Longa, (CURCUMIN) Powder by Does not apply route.     • albuterol 108 (90 BASE) MCG/ACT Aero Soln inhalation aerosol Inhale 2 Puffs by mouth every 6 hours as needed for Shortness of Breath. 8.5 g 1   • aspirin EC (ECOTRIN) 325 MG Tablet Delayed Response Take 1 Tab by mouth every day. 40 Tab 0   • GLUCOSAMINE-CHONDROITIN PO Take  by mouth.     • FOLIC ACID PO Take  by mouth every day.     • Hyoscyamine Sulfate (LEVSIN PO) Take  by mouth as needed.     • SIMVASTATIN PO Take 10 mg by mouth every evening.     • BIOTIN 5000 PO Take  by mouth every day.     • Multiple Vitamin (MULTIVITAMIN PO) Take  by mouth every day.     • naproxen (NAPROSYN) 375 MG TABS Take 375 mg by mouth 2 times a day, with meals.       No current facility-administered medications for this visit.        Allergies as of 11/03/2017 - Reviewed 11/03/2017   Allergen Reaction Noted   • Ciprofloxacin  06/13/2017   • Other drug  12/01/2010   • Sulfa drugs Hives 12/01/2010       Social History     Social History   • Marital status:      Spouse name: N/A   • Number of children: N/A   • Years of education: N/A     Occupational History   • Not on file.     Social History Main Topics   • Smoking status: Former Smoker     Years: 34.00     Quit date: 1/28/1978   • Smokeless tobacco: Former User     Quit date: 5/27/1978   • Alcohol use Yes      Comment: Rarely   • Drug use: No   • Sexual activity: Not on file     Other Topics Concern   • Not on file     Social History  "Narrative   • No narrative on file        Family History   Problem Relation Age of Onset   • Arthritis Mother    • Cancer Father    • Heart Disease Father    • Hypertension Father    • Diabetes Maternal Aunt    • Cancer Maternal Uncle    • Heart Disease Maternal Grandmother    • Diabetes Maternal Grandfather    • Genetic Paternal Grandmother    • Heart Disease Paternal Grandfather    • Hyperlipidemia Paternal Grandfather    • Diabetes Maternal Aunt    • Genetic Maternal Uncle    • Cancer Paternal Aunt    • Cancer Paternal Aunt    • Cancer Paternal Uncle        Past Surgical History:   Procedure Laterality Date   • HIP CANNULATED SCREW  1/28/2014    Performed by Cristian Delgado M.D. at SURGERY [facility]   • CLOSED REDUCTION  8/6/2012    Performed by CRISTIAN DELGADO at SURGERY [facility]   • HIP CANNULATED SCREW  8/6/2012    Performed by CRISTIAN DELGADO at SURGERY [facility]   • SHOULDER DECOMPRESSION ARTHROSCOPIC  10/6/2011    Performed by LARY CAR at [facility]   • CLAVICLE DISTAL EXCISION  10/6/2011    Performed by LARY CAR at [facility]   • OTHER  2007    reconstruction of shoulder to cover chest wall   • MAMMOPLASTY AUGMENTATION  1988    removed, post op complications   • OTHER  1988    17 reconstruction surgeries post mammoplasty   • MASTECTOMY  1972    b/l   • BREAST BIOPSY  1970's    x 5   • ABDOMINAL EXPLORATION     • EYE SURGERY     • OPEN REDUCTION         ROS   All systems reviewed are negative except for HPI         Objective:     Blood pressure 126/76, pulse 71, temperature 37.3 °C (99.2 °F), resp. rate 14, height 1.626 m (5' 4\"), weight 53.5 kg (118 lb), SpO2 94 %, not currently breastfeeding. Body mass index is 20.25 kg/m².   Physical Exam:  Constitutional: Alert, no distress, thin  Skin: Warm, dry, good turgor, no rashes in visible areas.  Eye: Equal, round and reactive, conjunctiva clear, lids normal.  ENMT: Lips without lesions, " good dentition, oropharynx clear. I dont appreciate aphthous   Neck: Trachea midline, no masses, no thyromegaly. No cervical or supraclavicular lymphadenopathy  Respiratory: Unlabored respiratory effort, lungs clear to auscultation, no wheezes, no ronchi.  Cardiovascular: Normal S1, S2, no murmur, no edema.  Abdomen: Soft, non-tender, no masses, no hepatosplenomegaly.  Psych: Alert and oriented x3, normal affect and mood.        Assessment and Plan:   The following treatment plan was discussed    1. Right flank pain  Resolved   - COMP METABOLIC PANEL; Future    2. Right lateral abdominal pain  Resolved   - COMP METABOLIC PANEL; Future    3. 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 Consider MRI if pain is still persistent  - COMP METABOLIC PANEL; Future    4. Aortic valve disease  5. Mitral valve disorder  Follow up with cardiologist  - COMP METABOLIC PANEL; Future      6. Gtz's esophagus without dysplasia  Follow up with gastroenterologist. Pt refuses PPI at this time   - CBC WITH DIFFERENTIAL; Future  - COMP METABOLIC PANEL; Future    7. Hyponatremia  Euvolemic, due to higher water intake   - COMP METABOLIC PANEL; Future    8. Lichen planus  Unchanged   - COMP METABOLIC PANEL; Future    9. Systemic lupus erythematosus, unspecified SLE type, unspecified organ involvement status (CMS-HCC)  10. Sjogren's syndrome, with unspecified organ involvement (CMS-HCC)  Not clinically , lab work negative . Not sure why she was told she aha    - COMP METABOLIC PANEL; Future  - CBC WITH DIFFERENTIAL; Future  - COMP METABOLIC PANEL; Future    11. Mild intermittent reactive airway disease without complication  Continue albuterol as needed  - COMP METABOLIC PANEL; Future      12. Mixed hyperlipidemia  Healthy lifestyle   - COMP METABOLIC PANEL; Future  - LIPID PROFILE; Future      13. Intractable migraine without aura and without status migrainosus  Stable on  topomax   - COMP METABOLIC PANEL; Future    14. Other osteoporosis without current pathological fracture  No need for DEXA scan, she cannot tolerate treatment   - COMP METABOLIC PANEL; Future    15. Vitamin D deficiency  Continue to monitor  - COMP METABOLIC PANEL; Future  - VITAMIN D,25 HYDROXY; Future    16. Leukopenia, unspecified type  Continue to monitor   - CBC WITH DIFFERENTIAL; Future  - COMP METABOLIC PANEL; Future    Total 42 minutes face-to-face time spent with patient, with greater than 50% of the total time discussing patient's issues and symptoms as listed above in assessment and plan, as well as managing coordination of care for future evaluation and treatment.      Followup: Return in about 6 months (around 5/3/2018) for Short.

## 2017-11-03 NOTE — ASSESSMENT & PLAN NOTE
She is being evaluated by Dr. Steiner,  and  Vahid in the past. She tells me that twice JOSÉ ANTONIO has been positive. All sjogren markers, JOSÉ ANTONIO, RF, CCP, Sed rate negative. She reports that at some point she had joint swelling, not able to open anything. She has dry mouth, dry eyes, aphthous lesion and lichen panus on her mouth. She was told to use tumeric. She had refused DMAR treatment. From lab work in the system, all lab work is negative in the past. She describes herself stable at this time

## 2017-11-03 NOTE — ASSESSMENT & PLAN NOTE
History of mitral valve prolapse. She denies palpitation, chest pain, shortness of breath. Echocardiogram with Dr. Grimaldo at [facility]

## 2017-11-03 NOTE — ASSESSMENT & PLAN NOTE
At previous apt I had evaluated pt for flank pain. CT scan showed mild left pelviectasis. Renal US showed mild dilation. She tells me that pain is resolved, she still continues to have back pain, but it is due to chronic pain, with sciatic nerve radiation . She used to follow up with pain specialist . She is going to try physical therapy. Advised patient if pain is persistent and is not getting better to let us know. consider an MRI evaluation

## 2017-11-03 NOTE — ASSESSMENT & PLAN NOTE
She tells me that she has migraines headache. She is on Topamax for prevention. She has had botox injections, but does not work for her.

## 2017-12-26 ENCOUNTER — TELEPHONE (OUTPATIENT)
Dept: MEDICAL GROUP | Facility: PHYSICIAN GROUP | Age: 77
End: 2017-12-26

## 2017-12-26 NOTE — TELEPHONE ENCOUNTER
Received a letter from [facility] stating as of 1/1/18 Proair is no longer covered.  The covered alternative is Ventolin HFA.  Letter scanned to media.  FYI to PCP.

## 2018-05-17 ENCOUNTER — OFFICE VISIT (OUTPATIENT)
Dept: MEDICAL GROUP | Facility: MEDICAL CENTER | Age: 78
End: 2018-05-17
Payer: MEDICARE

## 2018-05-17 VITALS
OXYGEN SATURATION: 98 % | TEMPERATURE: 98.6 F | HEIGHT: 64 IN | DIASTOLIC BLOOD PRESSURE: 82 MMHG | SYSTOLIC BLOOD PRESSURE: 128 MMHG | WEIGHT: 118 LBS | HEART RATE: 72 BPM | BODY MASS INDEX: 20.14 KG/M2

## 2018-05-17 DIAGNOSIS — Z12.39 SCREENING FOR MALIGNANT NEOPLASM OF BREAST: ICD-10-CM

## 2018-05-17 DIAGNOSIS — M32.9 SYSTEMIC LUPUS ERYTHEMATOSUS, UNSPECIFIED SLE TYPE, UNSPECIFIED ORGAN INVOLVEMENT STATUS (HCC): ICD-10-CM

## 2018-05-17 DIAGNOSIS — M25.551 PAIN OF RIGHT HIP JOINT: ICD-10-CM

## 2018-05-17 DIAGNOSIS — Z12.31 ENCOUNTER FOR SCREENING MAMMOGRAM FOR MALIGNANT NEOPLASM OF BREAST: ICD-10-CM

## 2018-05-17 DIAGNOSIS — M35.00 SJOGREN'S SYNDROME, WITH UNSPECIFIED ORGAN INVOLVEMENT (HCC): ICD-10-CM

## 2018-05-17 DIAGNOSIS — Z12.11 SCREENING FOR MALIGNANT NEOPLASM OF COLON: ICD-10-CM

## 2018-05-17 DIAGNOSIS — G43.019 INTRACTABLE MIGRAINE WITHOUT AURA AND WITHOUT STATUS MIGRAINOSUS: ICD-10-CM

## 2018-05-17 DIAGNOSIS — Z23 NEED FOR VACCINATION: ICD-10-CM

## 2018-05-17 DIAGNOSIS — M79.7 FIBROMYALGIA: ICD-10-CM

## 2018-05-17 PROCEDURE — 99214 OFFICE O/P EST MOD 30 MIN: CPT | Performed by: PHYSICIAN ASSISTANT

## 2018-05-17 RX ORDER — TOPIRAMATE 100 MG/1
100 TABLET, FILM COATED ORAL DAILY
Qty: 100 TAB | Refills: 0 | Status: SHIPPED | OUTPATIENT
Start: 2018-05-17 | End: 2018-12-20

## 2018-05-17 NOTE — PROGRESS NOTES
"Chief Complaint   Patient presents with   • Establish Care     General Check up   • Pain     (R) starts fro pelvic bone & wraps aroung lower back, poss Sciatica       HPI  Mariama Gonzales is a 77 y.o. female here today for follow up. She wants to establish w Dr. Camacho internist. patient was on the care of Dr. Bueno however wants to switch to this location because it's a lot closer to her house. She was in [facility].       Pt has been having chronic hip pain R>>L. She used to get epidural injections and physical therapy at [place] by the shop which would help with the pain. She hasn't been to physical therapy for over a year now. And states she has lost her pain management doctor but is looking for a new one. Patient has lupus Sjogren's and fibromyalgia and used to be under the care of rheumatologist however states her rheumatologist has a very tight and she is in need of finding someone new. Currently not taking any medicine for her autoimmune diseases. States methotrexate A, and even steroids cause kidney and lung infections so she had to stay away from them.    Patient has chronic migraine headaches and is currently taking Topamax 100 mg once daily. she hasn't had a migraine for a while now.         Past medical, surgical, family, and social history is reviewed in Epic chart by me today.   Medications and allergies reviewed and updated in Epic chart by me today.     ROS:   As documented in history of present illness above    Exam:  Blood pressure 128/82, pulse 72, temperature 37 °C (98.6 °F), height 1.626 m (5' 4\"), weight 53.5 kg (118 lb), SpO2 98 %, not currently breastfeeding.  Constitutional: Alert, no distress, plus 3 vital signs  Skin:  Warm, dry, no rashes invisible areas  Cardiovascular: RRR, no murmur,   Abdomen: Soft, nontender, no masses or hepatosplenomegaly  Psych: Alert, pleasant, well-groomed, normal affect    A/P:    1. Screening for malignant neoplasm of colon    - REFERRAL TO GI FOR " COLONOSCOPY    2. Screening for malignant neoplasm of breast      3. Sjogren's syndrome, with unspecified organ involvement (HCC)    - REFERRAL TO RHEUMATOLOGY    4. Systemic lupus erythematosus, unspecified SLE type, unspecified organ involvement status (HCC)    - REFERRAL TO RHEUMATOLOGY    5. Fibromyalgia      6. Encounter for screening mammogram for malignant neoplasm of breast    - [id number]; Future    7. Pain of right hip joint    - REFERRAL TO PAIN CLINIC    8. Need for vaccination    - NON SPECIFIED; Shingrex vaccine, IM deltoid  Dispense: 1 Each; Refill: 0    9. Intractable migraine without aura and without status migrainosus    - topiramate (TOPAMAX) 100 MG Tab; Take 1 Tab by mouth every day.  Dispense: 100 Tab; Refill: 0    Advised to do ordered blood work    f/u prn

## 2018-05-22 ENCOUNTER — APPOINTMENT (RX ONLY)
Dept: URBAN - METROPOLITAN AREA CLINIC 4 | Facility: CLINIC | Age: 78
Setting detail: DERMATOLOGY
End: 2018-05-22

## 2018-05-22 DIAGNOSIS — D18.0 HEMANGIOMA: ICD-10-CM

## 2018-05-22 DIAGNOSIS — L57.0 ACTINIC KERATOSIS: ICD-10-CM

## 2018-05-22 DIAGNOSIS — L82.0 INFLAMED SEBORRHEIC KERATOSIS: ICD-10-CM

## 2018-05-22 DIAGNOSIS — L90.5 SCAR CONDITIONS AND FIBROSIS OF SKIN: ICD-10-CM

## 2018-05-22 DIAGNOSIS — L82.1 OTHER SEBORRHEIC KERATOSIS: ICD-10-CM

## 2018-05-22 DIAGNOSIS — L56.8 OTHER SPECIFIED ACUTE SKIN CHANGES DUE TO ULTRAVIOLET RADIATION: ICD-10-CM

## 2018-05-22 DIAGNOSIS — D22 MELANOCYTIC NEVI: ICD-10-CM

## 2018-05-22 DIAGNOSIS — L81.4 OTHER MELANIN HYPERPIGMENTATION: ICD-10-CM

## 2018-05-22 PROBLEM — D18.01 HEMANGIOMA OF SKIN AND SUBCUTANEOUS TISSUE: Status: ACTIVE | Noted: 2018-05-22

## 2018-05-22 PROBLEM — D22.9 MELANOCYTIC NEVI, UNSPECIFIED: Status: ACTIVE | Noted: 2018-05-22

## 2018-05-22 PROCEDURE — 17000 DESTRUCT PREMALG LESION: CPT | Mod: 59

## 2018-05-22 PROCEDURE — 17003 DESTRUCT PREMALG LES 2-14: CPT

## 2018-05-22 PROCEDURE — ? DIAGNOSIS COMMENT

## 2018-05-22 PROCEDURE — 99214 OFFICE O/P EST MOD 30 MIN: CPT | Mod: 25

## 2018-05-22 PROCEDURE — 17110 DESTRUCTION B9 LES UP TO 14: CPT

## 2018-05-22 PROCEDURE — ? PRESCRIPTION

## 2018-05-22 PROCEDURE — ? LIQUID NITROGEN

## 2018-05-22 PROCEDURE — ? COUNSELING

## 2018-05-22 RX ORDER — IMIQUIMOD 50 MG/G
CREAM TOPICAL
Qty: 1 | Refills: 0 | Status: ERX | COMMUNITY
Start: 2018-05-22

## 2018-05-22 RX ADMIN — IMIQUIMOD: 50 CREAM TOPICAL at 00:00

## 2018-05-22 ASSESSMENT — LOCATION DETAILED DESCRIPTION DERM
LOCATION DETAILED: LEFT SUPERIOR FLANK
LOCATION DETAILED: RIGHT MID BACK
LOCATION DETAILED: LEFT SUPERIOR LATERAL MIDBACK
LOCATION DETAILED: LEFT LATERAL SUPERIOR CHEST
LOCATION DETAILED: RIGHT INFERIOR LATERAL UPPER BACK
LOCATION DETAILED: RIGHT SUPERIOR FLANK
LOCATION DETAILED: MIDDLE STERNUM
LOCATION DETAILED: LEFT DISTAL PRETIBIAL REGION
LOCATION DETAILED: RIGHT INFERIOR VERMILION LIP
LOCATION DETAILED: LEFT LATERAL UPPER BACK
LOCATION DETAILED: LEFT INFERIOR LATERAL UPPER BACK

## 2018-05-22 ASSESSMENT — LOCATION ZONE DERM
LOCATION ZONE: TRUNK
LOCATION ZONE: LIP
LOCATION ZONE: LEG

## 2018-05-22 ASSESSMENT — LOCATION SIMPLE DESCRIPTION DERM
LOCATION SIMPLE: LEFT LOWER BACK
LOCATION SIMPLE: RIGHT LIP
LOCATION SIMPLE: LEFT UPPER BACK
LOCATION SIMPLE: RIGHT UPPER BACK
LOCATION SIMPLE: CHEST
LOCATION SIMPLE: LEFT PRETIBIAL REGION
LOCATION SIMPLE: RIGHT LOWER BACK
LOCATION SIMPLE: ABDOMEN

## 2018-05-22 NOTE — HPI: FULL BODY SKIN EXAMINATION
How Severe Are Your Spot(S)?: moderate
What Is The Reason For Today's Visit?: Full Body Skin Examination
What Is The Reason For Today's Visit? (Being Monitored For X): concerning skin lesions on an annual basis
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

## 2018-05-22 NOTE — PROCEDURE: DIAGNOSIS COMMENT
Comment: No evidence of recurrence
Detail Level: Detailed
Comment: Biopsy proven, this has been treated with liquid nitrogen 2-3 times,  keeps reoccurring

## 2018-05-22 NOTE — PROCEDURE: LIQUID NITROGEN
Consent: The patient's consent was obtained including but not limited to risks of crusting, scabbing, blistering, scarring, darker or lighter pigmentary change, recurrence, incomplete removal and infection.
Add 52 Modifier (Optional): no
Number Of Freeze-Thaw Cycles: 2 freeze-thaw cycles
Duration Of Freeze Thaw-Cycle (Seconds): 5-10
Detail Level: Detailed
Medical Necessity Information: It is in your best interest to select a reason for this procedure from the list below. All of these items fulfill various CMS LCD requirements except the new and changing color options.
Medical Necessity Clause: This procedure was medically necessary because the lesions that were treated were:
Post-Care Instructions: I reviewed with the patient in detail post-care instructions. Patient is to wear sunprotection, and avoid picking at any of the treated lesions. Pt may apply Vaseline to crusted or scabbing areas.
Duration Of Freeze Thaw-Cycle (Seconds): 5
Number Of Freeze-Thaw Cycles: 1 freeze-thaw cycle
Render Post-Care Instructions In Note?: yes

## 2018-06-13 ENCOUNTER — APPOINTMENT (OUTPATIENT)
Dept: RADIOLOGY | Facility: MEDICAL CENTER | Age: 78
End: 2018-06-13
Attending: PHYSICIAN ASSISTANT
Payer: MEDICARE

## 2018-06-19 ENCOUNTER — APPOINTMENT (RX ONLY)
Dept: URBAN - METROPOLITAN AREA CLINIC 4 | Facility: CLINIC | Age: 78
Setting detail: DERMATOLOGY
End: 2018-06-19

## 2018-06-19 DIAGNOSIS — L56.8 OTHER SPECIFIED ACUTE SKIN CHANGES DUE TO ULTRAVIOLET RADIATION: ICD-10-CM

## 2018-06-19 PROCEDURE — ? PRESCRIPTION

## 2018-06-19 PROCEDURE — ? PATIENT SPECIFIC COUNSELING

## 2018-06-19 PROCEDURE — ? DIAGNOSIS COMMENT

## 2018-06-19 PROCEDURE — 99212 OFFICE O/P EST SF 10 MIN: CPT

## 2018-06-19 RX ORDER — CLOBETASOL PROPIONATE 0.5 MG/G
1 GEL TOPICAL
Qty: 1 | Refills: 0 | Status: ERX | COMMUNITY
Start: 2018-06-19

## 2018-06-19 RX ADMIN — CLOBETASOL PROPIONATE 1: 0.5 GEL TOPICAL at 00:00

## 2018-06-19 ASSESSMENT — LOCATION ZONE DERM: LOCATION ZONE: LIP

## 2018-06-19 ASSESSMENT — LOCATION SIMPLE DESCRIPTION DERM: LOCATION SIMPLE: RIGHT LIP

## 2018-06-19 ASSESSMENT — LOCATION DETAILED DESCRIPTION DERM: LOCATION DETAILED: RIGHT INFERIOR VERMILION LIP

## 2018-06-19 NOTE — PROCEDURE: PATIENT SPECIFIC COUNSELING
Detail Level: Zone
The patient is advised to D/C the imiquimod and apply the Clobetasolgel as directed. She is encouraged to moisturize with ointment such as Cerave healing ointment as much ch as she can daily.

## 2018-06-26 ENCOUNTER — OFFICE VISIT (OUTPATIENT)
Dept: MEDICAL GROUP | Facility: MEDICAL CENTER | Age: 78
End: 2018-06-26
Payer: MEDICARE

## 2018-06-26 VITALS
DIASTOLIC BLOOD PRESSURE: 74 MMHG | OXYGEN SATURATION: 96 % | WEIGHT: 114.64 LBS | HEART RATE: 76 BPM | SYSTOLIC BLOOD PRESSURE: 112 MMHG | TEMPERATURE: 98.3 F | RESPIRATION RATE: 16 BRPM | BODY MASS INDEX: 19.57 KG/M2 | HEIGHT: 64 IN

## 2018-06-26 DIAGNOSIS — F33.41 RECURRENT MAJOR DEPRESSIVE DISORDER, IN PARTIAL REMISSION (HCC): ICD-10-CM

## 2018-06-26 DIAGNOSIS — K22.70 BARRETT'S ESOPHAGUS WITHOUT DYSPLASIA: ICD-10-CM

## 2018-06-26 DIAGNOSIS — M35.00 SJOGREN'S SYNDROME, WITH UNSPECIFIED ORGAN INVOLVEMENT (HCC): ICD-10-CM

## 2018-06-26 DIAGNOSIS — M32.19 SYSTEMIC LUPUS ERYTHEMATOSUS WITH OTHER ORGAN INVOLVEMENT, UNSPECIFIED SLE TYPE (HCC): ICD-10-CM

## 2018-06-26 DIAGNOSIS — M54.40 CHRONIC RIGHT-SIDED LOW BACK PAIN WITH SCIATICA, SCIATICA LATERALITY UNSPECIFIED: ICD-10-CM

## 2018-06-26 DIAGNOSIS — Z12.11 SCREENING FOR COLON CANCER: ICD-10-CM

## 2018-06-26 DIAGNOSIS — G43.009 MIGRAINE WITHOUT AURA AND WITHOUT STATUS MIGRAINOSUS, NOT INTRACTABLE: ICD-10-CM

## 2018-06-26 DIAGNOSIS — E87.1 HYPONATREMIA: ICD-10-CM

## 2018-06-26 DIAGNOSIS — M81.8 OTHER OSTEOPOROSIS WITHOUT CURRENT PATHOLOGICAL FRACTURE: ICD-10-CM

## 2018-06-26 DIAGNOSIS — J45.20 MILD INTERMITTENT REACTIVE AIRWAY DISEASE WITHOUT COMPLICATION: ICD-10-CM

## 2018-06-26 DIAGNOSIS — G89.29 CHRONIC RIGHT-SIDED LOW BACK PAIN WITH SCIATICA, SCIATICA LATERALITY UNSPECIFIED: ICD-10-CM

## 2018-06-26 DIAGNOSIS — Z76.89 ESTABLISHING CARE WITH NEW DOCTOR, ENCOUNTER FOR: ICD-10-CM

## 2018-06-26 PROBLEM — D72.819 LEUKOPENIA: Status: RESOLVED | Noted: 2017-11-03 | Resolved: 2018-06-26

## 2018-06-26 PROCEDURE — 99214 OFFICE O/P EST MOD 30 MIN: CPT | Performed by: INTERNAL MEDICINE

## 2018-06-26 RX ORDER — ALENDRONATE SODIUM 70 MG/1
70 TABLET ORAL
Qty: 4 TAB | Refills: 5 | Status: SHIPPED | OUTPATIENT
Start: 2018-06-26 | End: 2018-12-20

## 2018-06-26 RX ORDER — ALBUTEROL SULFATE 90 UG/1
2 AEROSOL, METERED RESPIRATORY (INHALATION) EVERY 6 HOURS PRN
Qty: 8.5 G | Refills: 2 | Status: SHIPPED | OUTPATIENT
Start: 2018-06-26 | End: 2020-04-17 | Stop reason: SDUPTHER

## 2018-06-26 RX ORDER — ALPRAZOLAM 0.25 MG/1
0.25 TABLET ORAL NIGHTLY PRN
COMMUNITY
End: 2019-03-05

## 2018-06-26 RX ORDER — ALPRAZOLAM 0.5 MG/1
0.5 TABLET ORAL NIGHTLY PRN
COMMUNITY
End: 2022-01-14

## 2018-06-26 RX ORDER — FLUVOXAMINE MALEATE 100 MG/1
100 CAPSULE, EXTENDED RELEASE ORAL
COMMUNITY
End: 2024-03-13

## 2018-06-26 ASSESSMENT — PATIENT HEALTH QUESTIONNAIRE - PHQ9
CLINICAL INTERPRETATION OF PHQ2 SCORE: 3
SUM OF ALL RESPONSES TO PHQ QUESTIONS 1-9: 15
5. POOR APPETITE OR OVEREATING: 1 - SEVERAL DAYS

## 2018-06-26 NOTE — PROGRESS NOTES
Chief Complaint   Patient presents with   • Establish Care   • Squamous Cell Carcinoma     sees    • Lupus       HISTORY OF PRESENT ILLNESS: Patient is a 77 y.o. female patient who presents today to discuss the evaluation and management of:          1. Establishing care with new doctor, encounter for    Previously followed by , moving here for geographic reasons. Last seen November 2017.    2. Recurrent major depressive disorder, in partial remission (HCC)    Followed by Dr. Puente of psychiatry, on Luvox for several years. She also takes one half of his Xanax at bedtime for anxiety and sleep.      3. Migraine without aura and without status migrainosus, not intractable    Patient takes Topamax 100 mg daily as preventative.    4. Hyponatremia    Patient states she drinks a lot of water and this is why her sodium is now is low. When last checked in September 2017 it was 127, hasn't been checked since.    5. Sjogren's syndrome, with unspecified organ involvement ([facility])    With chronic dry mouth and dry eyes. Has not been seen by rheumatology for quite a while as her previous one retired, however has  appointment to establish with new provider in December.    6. Systemic lupus erythematosus with other organ involvement, unspecified SLE type ([facility])    Patient unable to tolerate DMARDs, had full panel of blood work done last year with serologies negative. She states she has occasional leukopenia and joint pains. Not currently on any medications.    7. 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 Other osteoporosis without current pathological fracture    Last DEXA scan 2010, at that time had T score -3.2 the left femur. Patient in the past has refused biphosphonates, however she is willing to try Fosamax today.    9. Screening for colon cancer    Had been referred for  colonoscopy, however patient is very reluctant due to the nature of the prep. She becomes very dehydrated. Discussed alternatives such as FIT testing.    10. Mild intermittent reactive airway disease without complication    Mild intermittent asthma, requesting refill on albuterol inhaler.    11. 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 08/03/2017   • Gtz's esophagus without dysplasia 06/29/2017   • Reactive airway disease without complication 06/29/2017   • Migraine without aura    • Hyponatremia 01/28/2014   • Lupus (systemic lupus erythematosus) ([facility]) 07/14/2011   • Sjogren's syndrome ([facility]) 07/14/2011   • Aortic valve disease 07/14/2011   • Lichen planus 07/14/2011        Allergies:Ciprofloxacin; Other drug; and Sulfa drugs    Current meds including changes today  Current Outpatient Prescriptions   Medication Sig Dispense Refill   • ALPRAZolam (XANAX) 0.5 MG Tab Take 0.5 mg by mouth at bedtime as needed for Sleep.     • Fluvoxamine Maleate 100 MG CAPSULE SR 24 HR Take 100 mg by mouth every bedtime.     • ALPRAZolam (XANAX) 0.25 MG Tab Take 0.25 mg by mouth at bedtime as needed.     • albuterol 108 (90 Base) MCG/ACT Aero Soln inhalation aerosol Inhale 2 Puffs by mouth every 6 hours as needed for Shortness of Breath. 8.5 g 2   • alendronate (FOSAMAX) 70 MG Tab Take 1 Tab by mouth every 7 days. 4 Tab 5   • NON SPECIFIED Shingrex vaccine, IM deltoid 1 Each 0   • topiramate (TOPAMAX) 100 MG Tab Take 1 Tab by mouth every day. 100 Tab 0   • coenzyme Q-10 30 MG capsule Take 60 mg by mouth every day.     • Turmeric, Curcuma Longa, (CURCUMIN) Powder by Does not  "apply route.     • aspirin EC (ECOTRIN) 325 MG Tablet Delayed Response Take 1 Tab by mouth every day. 40 Tab 0   • GLUCOSAMINE-CHONDROITIN PO Take  by mouth.     • FOLIC ACID PO Take  by mouth every day.     • Hyoscyamine Sulfate (LEVSIN PO) Take  by mouth as needed.     • BIOTIN 5000 PO Take  by mouth every day.     • Multiple Vitamin (MULTIVITAMIN PO) Take  by mouth every day.     • naproxen (NAPROSYN) 375 MG TABS Take 375 mg by mouth 2 times a day, with meals.       No current facility-administered medications for this visit.      Social History   Substance Use Topics   • Smoking status: Former Smoker     Years: 34.00     Quit date: 1/28/1978   • Smokeless tobacco: Former User     Quit date: 5/27/1978   • Alcohol use Yes      Comment: Rarely     Social History     Social History Narrative   • No narrative on file       Family History   Problem Relation Age of Onset   • Arthritis Mother    • Cancer Father    • Heart Disease Father    • Hypertension Father    • Diabetes Maternal Aunt    • Cancer Maternal Uncle    • Heart Disease Maternal Grandmother    • Diabetes Maternal Grandfather    • Genetic Paternal Grandmother    • Heart Disease Paternal Grandfather    • Hyperlipidemia Paternal Grandfather    • Diabetes Maternal Aunt    • Genetic Maternal Uncle    • Cancer Paternal Aunt    • Cancer Paternal Aunt    • Cancer Paternal Uncle        Review of Systems:  No chest pain, No shortness of breath, No dyspnea on exertion  Gastrointestinal ROS: No abdominal pain, No nausea, vomiting, diarrhea, or constipation  Skin; recent squamous cell removed from lower lip      Exam:      Blood pressure 112/74, pulse 76, temperature 36.8 °C (98.3 °F), resp. rate 16, height 1.626 m (5' 4\"), weight 52 kg (114 lb 10.2 oz), SpO2 96 %, not currently breastfeeding.  General:  Well nourished, well developed female in NAD affect and mood within normal limits  Head is grossly normal.  Neck: Supple without adenopathy  Pulmonary: Clear to " ausculation.  Normal effort. No rales, rhonchi, or wheezing.  Cardiovascular: Regular rate and rhythm without murmur.   Extremities: no clubbing, cyanosis, or edema.  Neuro: moves all extremities symmetrically    Please note that this dictation was created using voice recognition software. I have made every reasonable attempt to correct obvious errors, but I expect that there are errors of grammar and possibly content that I did not discover before finalizing the note.    Assessment/Plan:  1. Establishing care with new doctor, encounter for        2. Recurrent major depressive disorder, in partial remission (HCC)    Stable, continue Luvox    3. Migraine without aura and without status migrainosus, not intractable    Stable, continue Topamax    4. Hyponatremia      - COMP METABOLIC PANEL; Future    5. Sjogren's syndrome, with unspecified organ involvement ([facility])        6. Systemic lupus erythematosus with other organ involvement, unspecified SLE type (HCC)      - CBC WITHOUT DIFFERENTIAL; Future    7. 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 Other osteoporosis without current pathological fracture      - VITAMIN D,25 HYDROXY; Future  - alendronate (FOSAMAX) 70 MG Tab; Take 1 Tab by mouth every 7 days.  Dispense: 4 Tab; Refill: 5    9. Screening for colon cancer      - OCCULT BLOOD FECES IMMUNOASSAY (FIT); Future    10. Mild intermittent reactive airway disease without complication      - albuterol 108 (90 Base) MCG/ACT Aero Soln inhalation aerosol; Inhale 2 Puffs by mouth every 6 hours as needed for Shortness of Breath.  Dispense: 8.5 g; Refill: 2    11. Gtz's esophagus without dysplasia        Followup: Return in about 6 months (around 12/26/2018).

## 2018-06-27 ENCOUNTER — RX ONLY (OUTPATIENT)
Age: 78
Setting detail: RX ONLY
End: 2018-06-27

## 2018-06-27 ENCOUNTER — APPOINTMENT (RX ONLY)
Dept: URBAN - METROPOLITAN AREA CLINIC 4 | Facility: CLINIC | Age: 78
Setting detail: DERMATOLOGY
End: 2018-06-27

## 2018-06-27 RX ORDER — TRIAMCINOLONE ACETONIDE 1 MG/G
OINTMENT TOPICAL
Qty: 1 | Refills: 1 | Status: ERX | COMMUNITY
Start: 2018-06-27

## 2018-07-18 ENCOUNTER — APPOINTMENT (RX ONLY)
Dept: URBAN - METROPOLITAN AREA CLINIC 4 | Facility: CLINIC | Age: 78
Setting detail: DERMATOLOGY
End: 2018-07-18

## 2018-07-18 DIAGNOSIS — L56.8 OTHER SPECIFIED ACUTE SKIN CHANGES DUE TO ULTRAVIOLET RADIATION: ICD-10-CM | Status: IMPROVED

## 2018-07-18 DIAGNOSIS — L57.0 ACTINIC KERATOSIS: ICD-10-CM

## 2018-07-18 PROCEDURE — 99212 OFFICE O/P EST SF 10 MIN: CPT | Mod: 25

## 2018-07-18 PROCEDURE — ? DIAGNOSIS COMMENT

## 2018-07-18 PROCEDURE — ? LIQUID NITROGEN

## 2018-07-18 PROCEDURE — ? PATIENT SPECIFIC COUNSELING

## 2018-07-18 PROCEDURE — 17000 DESTRUCT PREMALG LESION: CPT

## 2018-07-18 PROCEDURE — ? COUNSELING

## 2018-07-18 ASSESSMENT — LOCATION DETAILED DESCRIPTION DERM
LOCATION DETAILED: RIGHT LATERAL EYEBROW
LOCATION DETAILED: RIGHT INFERIOR VERMILION LIP

## 2018-07-18 ASSESSMENT — LOCATION ZONE DERM
LOCATION ZONE: LIP
LOCATION ZONE: FACE

## 2018-07-18 ASSESSMENT — LOCATION SIMPLE DESCRIPTION DERM
LOCATION SIMPLE: RIGHT EYEBROW
LOCATION SIMPLE: RIGHT LIP

## 2018-07-18 NOTE — PROCEDURE: LIQUID NITROGEN
Number Of Freeze-Thaw Cycles: 1 freeze-thaw cycle
Render Post-Care Instructions In Note?: yes
Detail Level: Detailed
Consent: The patient's consent was obtained including but not limited to risks of crusting, scabbing, blistering, scarring, darker or lighter pigmentary change, recurrence, incomplete removal and infection.
Duration Of Freeze Thaw-Cycle (Seconds): 5
Post-Care Instructions: I reviewed with the patient in detail post-care instructions. Patient is to wear sunprotection, and avoid picking at any of the treated lesions. Pt may apply Vaseline to crusted or scabbing areas.

## 2018-08-29 ENCOUNTER — HOSPITAL ENCOUNTER (OUTPATIENT)
Dept: LAB | Facility: MEDICAL CENTER | Age: 78
End: 2018-08-29
Attending: INTERNAL MEDICINE
Payer: MEDICARE

## 2018-08-29 DIAGNOSIS — M32.19 SYSTEMIC LUPUS ERYTHEMATOSUS WITH OTHER ORGAN INVOLVEMENT, UNSPECIFIED SLE TYPE (HCC): ICD-10-CM

## 2018-08-29 DIAGNOSIS — M81.8 OTHER OSTEOPOROSIS WITHOUT CURRENT PATHOLOGICAL FRACTURE: ICD-10-CM

## 2018-08-29 DIAGNOSIS — E87.1 HYPONATREMIA: ICD-10-CM

## 2018-08-29 LAB
25(OH)D3 SERPL-MCNC: 28 NG/ML (ref 30–100)
ALBUMIN SERPL BCP-MCNC: 4.6 G/DL (ref 3.2–4.9)
ALBUMIN/GLOB SERPL: 1.5 G/DL
ALP SERPL-CCNC: 58 U/L (ref 30–99)
ALT SERPL-CCNC: 14 U/L (ref 2–50)
ANION GAP SERPL CALC-SCNC: 8 MMOL/L (ref 0–11.9)
AST SERPL-CCNC: 25 U/L (ref 12–45)
BILIRUB SERPL-MCNC: 0.6 MG/DL (ref 0.1–1.5)
BUN SERPL-MCNC: 28 MG/DL (ref 8–22)
CALCIUM SERPL-MCNC: 9.6 MG/DL (ref 8.5–10.5)
CHLORIDE SERPL-SCNC: 102 MMOL/L (ref 96–112)
CO2 SERPL-SCNC: 26 MMOL/L (ref 20–33)
CREAT SERPL-MCNC: 0.85 MG/DL (ref 0.5–1.4)
ERYTHROCYTE [DISTWIDTH] IN BLOOD BY AUTOMATED COUNT: 44 FL (ref 35.9–50)
GLOBULIN SER CALC-MCNC: 3 G/DL (ref 1.9–3.5)
GLUCOSE SERPL-MCNC: 86 MG/DL (ref 65–99)
HCT VFR BLD AUTO: 46.5 % (ref 37–47)
HGB BLD-MCNC: 15.4 G/DL (ref 12–16)
MCH RBC QN AUTO: 31.4 PG (ref 27–33)
MCHC RBC AUTO-ENTMCNC: 33.1 G/DL (ref 33.6–35)
MCV RBC AUTO: 94.9 FL (ref 81.4–97.8)
PLATELET # BLD AUTO: 244 K/UL (ref 164–446)
PMV BLD AUTO: 9.5 FL (ref 9–12.9)
POTASSIUM SERPL-SCNC: 4 MMOL/L (ref 3.6–5.5)
PROT SERPL-MCNC: 7.6 G/DL (ref 6–8.2)
RBC # BLD AUTO: 4.9 M/UL (ref 4.2–5.4)
SODIUM SERPL-SCNC: 136 MMOL/L (ref 135–145)
WBC # BLD AUTO: 3.7 K/UL (ref 4.8–10.8)

## 2018-08-29 PROCEDURE — 85027 COMPLETE CBC AUTOMATED: CPT

## 2018-08-29 PROCEDURE — 36415 COLL VENOUS BLD VENIPUNCTURE: CPT

## 2018-08-29 PROCEDURE — 82306 VITAMIN D 25 HYDROXY: CPT

## 2018-08-29 PROCEDURE — 80053 COMPREHEN METABOLIC PANEL: CPT

## 2018-09-05 ENCOUNTER — APPOINTMENT (RX ONLY)
Dept: URBAN - METROPOLITAN AREA CLINIC 4 | Facility: CLINIC | Age: 78
Setting detail: DERMATOLOGY
End: 2018-09-05

## 2018-09-05 DIAGNOSIS — L56.8 OTHER SPECIFIED ACUTE SKIN CHANGES DUE TO ULTRAVIOLET RADIATION: ICD-10-CM

## 2018-09-05 PROCEDURE — ? COUNSELING

## 2018-09-05 PROCEDURE — 99212 OFFICE O/P EST SF 10 MIN: CPT

## 2018-09-05 PROCEDURE — ? DIAGNOSIS COMMENT

## 2018-09-05 PROCEDURE — ? TREATMENT REGIMEN

## 2018-09-05 ASSESSMENT — LOCATION DETAILED DESCRIPTION DERM: LOCATION DETAILED: RIGHT INFERIOR VERMILION LIP

## 2018-09-05 ASSESSMENT — LOCATION ZONE DERM: LOCATION ZONE: LIP

## 2018-09-05 ASSESSMENT — LOCATION SIMPLE DESCRIPTION DERM: LOCATION SIMPLE: RIGHT LIP

## 2018-09-05 NOTE — PROCEDURE: TREATMENT REGIMEN
Continue Regimen: Continue to apply Clobetasol as directedprn
Otc Regimen: Moisturize with greasy lip balms as much as possible
Detail Level: Zone

## 2018-09-12 ENCOUNTER — OFFICE VISIT (OUTPATIENT)
Dept: RHEUMATOLOGY | Facility: PHYSICIAN GROUP | Age: 78
End: 2018-09-12
Payer: MEDICARE

## 2018-09-12 VITALS
DIASTOLIC BLOOD PRESSURE: 70 MMHG | HEART RATE: 71 BPM | WEIGHT: 116.2 LBS | OXYGEN SATURATION: 98 % | BODY MASS INDEX: 19.84 KG/M2 | SYSTOLIC BLOOD PRESSURE: 138 MMHG | TEMPERATURE: 99 F | HEIGHT: 64 IN

## 2018-09-12 DIAGNOSIS — G89.29 CHRONIC RIGHT-SIDED LOW BACK PAIN WITH SCIATICA, SCIATICA LATERALITY UNSPECIFIED: ICD-10-CM

## 2018-09-12 DIAGNOSIS — M35.00 SJOGREN'S SYNDROME, WITH UNSPECIFIED ORGAN INVOLVEMENT (HCC): ICD-10-CM

## 2018-09-12 DIAGNOSIS — E55.9 VITAMIN D DEFICIENCY: ICD-10-CM

## 2018-09-12 DIAGNOSIS — M32.19 SYSTEMIC LUPUS ERYTHEMATOSUS WITH OTHER ORGAN INVOLVEMENT, UNSPECIFIED SLE TYPE (HCC): Primary | ICD-10-CM

## 2018-09-12 DIAGNOSIS — L43.9 LICHEN PLANUS: ICD-10-CM

## 2018-09-12 DIAGNOSIS — M81.8 OTHER OSTEOPOROSIS WITHOUT CURRENT PATHOLOGICAL FRACTURE: ICD-10-CM

## 2018-09-12 DIAGNOSIS — M54.40 CHRONIC RIGHT-SIDED LOW BACK PAIN WITH SCIATICA, SCIATICA LATERALITY UNSPECIFIED: ICD-10-CM

## 2018-09-12 DIAGNOSIS — M19.93 SECONDARY OSTEOARTHRITIS: ICD-10-CM

## 2018-09-12 PROCEDURE — 99204 OFFICE O/P NEW MOD 45 MIN: CPT | Performed by: INTERNAL MEDICINE

## 2018-09-12 RX ORDER — LIDOCAINE 50 MG/G
OINTMENT TOPICAL
COMMUNITY
Start: 2018-07-02 | End: 2022-04-22

## 2018-09-12 RX ORDER — TRIAMCINOLONE ACETONIDE 1 MG/G
OINTMENT TOPICAL
COMMUNITY
Start: 2018-06-27 | End: 2022-04-22

## 2018-09-12 NOTE — PROGRESS NOTES
"Rheumatology Consult Note        Reason for Consult: Known SLE and Sjogren's      Consult Requested By:  Siomara Miguel P.A.-C.        Chief complaint: \"Arthritis, lupus, Sjogren's\"      HPI: 77 y/o F with depression, migraines, osteoporosis, and reported SLE and Sjogren's presents today as referred by her PCP for SLE and Sjogren's.    Per the patient she was dx with SLE in 1990 based on periodic rash on her arms that left scars and joint pain.  She took Plaquenil around this time for 3-4 years, stopped as she says it was not working.  She took prednisone for about 15 years (about 10-50mg daily) and MTX she only took 2 doses, she reports severe lung infections and UTIs with both MTX and steroids.  Otherwise, she had only taken anti-inflammatories.  She reports that the skin starting clearing up and has not had rashes on her arms for the past 10 years. She does still note joint pain and feels she still gets a malar rash intermittently (not present now). She reports right eye cental vision loss for the past 3 years, she was seen by ophtho who feels it may be from longstanding lupus.      Her joint pain is mainly of her hands (PIPs and DIPs) associated with using her hands and she is now having trouble making fists as a result of the deformities. Denies red, hot swollen joints.  She does still have 2 hours of morning stiffness.  She also has chronic back pain for which she has seen pain management, has dx of spinal stenosis with right sided sciatica.  Plan for possible minimally invasive surgery in October. She has felt feverish over the last few weeks but no known fevers.  Has chronic cough.    She endorses photosensitivity, dry eyes, dry mouth, oral ulcers (however dx as lichen planus) and Raynaud's.  No hx of serositis or renal involvement of lupus. Denies night sweats or unexplained weight loss.     No hx of blood clots, or strokes.  Had 2 miscarriages in past, one at 3 months and one at 5 months.    Patient " reports hx of bilateral hip fractures and left arm fracture from ground level falls.  She reports 8 years ago she took 6 months of Forteo then stopped, has recent prescribed Alendronate by PCP but has not started yet.          Rheum Background:  Labs:  JOSÉ ANTONIO negative x 4, SSA, SSB, CCP, RF negative  C4 low, C3 normal (lower end of normal)  ESR/CRP normal  UA without blood or protein  8/2017:  HCV ab, Hep A, hep B surface ag and core IgM negative    Imaging:    CT abd/pelvis 6/2017:  1.  Mild left pelvocaliectasis. Nonspecific finding but could be related to infection.  2.  No right hydronephrosis.  3.  Gallbladder is not well demonstrated.  4.  Diverticula of the colon. No evidence diverticulitis. No free fluid.  5.  The appendix is not delineated.    - On plaquenil for about 3-4 years  methotrexate steroids cause kidney and lung infections     Osteoporosis:  Patient reports hx of bilateral hip fractures and left arm fracture from ground level falls.  She reports 8 years ago she took 6 months of Forteo then stopped, has recent prescribed Alendronate by PCP but has not started yet.      ROS:    Gen: (-) fatigue (-) weight loss (-) fever   Skin: (-) rash ,(+)photosensitivity   HEENT: (-) gland swelling, (+) dry eyes ,(+) dry mouth ,(-) mouth sores,   (-) hair loss, (-) conjuctivitis   CVS: (-) chest pain , (+) Raynauds   Resp: (-) cough, (-) shortness of breath  GI: (-) diarrhea, nausea, or vomiting ,(-) abdominal pain   : (-) dysuria (-) hematuria   Neuro: (-) pins / needles, (-) numbness, (-) weakness   Psych: (-)depression, (-) anxiety   Endo: (-) heat intolerance, (-) cold intolerance   Heme/Lymph:(-) bruising, (-) bleeding   MS: per HPI            Prior to Admission medications    Medication Sig Start Date End Date Taking? Authorizing Provider   ALPRAZolam (XANAX) 0.5 MG Tab Take 0.5 mg by mouth at bedtime as needed for Sleep.    Physician Outpatient   Fluvoxamine Maleate 100 MG CAPSULE SR 24 HR Take 100 mg by  "mouth every bedtime.    Physician Outpatient   ALPRAZolam (XANAX) 0.25 MG Tab Take 0.25 mg by mouth at bedtime as needed.    Physician Outpatient   albuterol 108 (90 Base) MCG/ACT Aero Soln inhalation aerosol Inhale 2 Puffs by mouth every 6 hours as needed for Shortness of Breath. 6/26/18   Haydee Camacho M.D.   alendronate (FOSAMAX) 70 MG Tab Take 1 Tab by mouth every 7 days. 6/26/18   Haydee Camacho M.D.   NON SPECIFIED Shingrex vaccine, IM deltoid 5/17/18   Siomara Miguel P.A.-C.   topiramate (TOPAMAX) 100 MG Tab Take 1 Tab by mouth every day. 5/17/18   Siomara Miguel P.A.-C.   coenzyme Q-10 30 MG capsule Take 60 mg by mouth every day.    Physician Outpatient   Turmeric, Curcuma Longa, (CURCUMIN) Powder by Does not apply route.    Physician Outpatient   aspirin EC (ECOTRIN) 325 MG Tablet Delayed Response Take 1 Tab by mouth every day. 8/11/16   Carlos Vaughan M.D.   GLUCOSAMINE-CHONDROITIN PO Take  by mouth.    Physician Outpatient   FOLIC ACID PO Take  by mouth every day.    Physician Outpatient   Hyoscyamine Sulfate (LEVSIN PO) Take  by mouth as needed. 7/14/11   Physician Outpatient   BIOTIN 5000 PO Take  by mouth every day.    Physician Outpatient   Multiple Vitamin (MULTIVITAMIN PO) Take  by mouth every day.    Physician Outpatient   naproxen (NAPROSYN) 375 MG TABS Take 375 mg by mouth 2 times a day, with meals.    Physician Outpatient            Allergies   Allergen Reactions   • Ciprofloxacin    • Other Drug      \"tumor necrosis factor drugs\" infections   • Sulfa Drugs Hives           Past Medical History:   Diagnosis Date   • Allergy, unspecified not elsewhere classified    • Anxiety    • Arthritis    • Blood transfusion, without reported diagnosis    • CATARACT    • Dental disorder     lichen planus   • Depression    • Fibromyalgia    • GERD (gastroesophageal reflux disease)    • GERD (gastroesophageal reflux disease)    • Headache(784.0)    • Headache, classical migraine    • " Kidney calculi    • Lichen planus    • Lupus    • Migraine without aura, without mention of intractable migraine without mention of status migrainosus    • Mitral valve prolapse    • Osteoporosis, unspecified    • Other convulsions     Isolated seizure March, 2014   • Pain     joints, muscles   • Psychiatric problem     depression   • Shingles 10/4/11    dr velasco aware of this back of head   • Sjogren's syndrome (HCC)    • Snoring    • Unspecified asthma(493.90)    • Urinary tract infection, site not specified             Past Surgical History:   Procedure Laterality Date   • HIP CANNULATED SCREW  1/28/2014    Performed by Cristian Delgado M.D. at SURGERY [facility]   • CLOSED REDUCTION  8/6/2012    Performed by CRISTIAN DELGADO at SURGERY [facility]   • HIP CANNULATED SCREW  8/6/2012    Performed by CRISTIAN DELGADO at [facility]   • SHOULDER DECOMPRESSION ARTHROSCOPIC  10/6/2011    Performed by LARY CAR at SURGERY [facility]   • CLAVICLE DISTAL EXCISION  10/6/2011    Performed by LARY CAR at SURGERY [facility]   • OTHER  2007    reconstruction of shoulder to cover chest wall   • MAMMOPLASTY AUGMENTATION  1988    removed, post op complications   • OTHER  1988    17 reconstruction surgeries post mammoplasty   • MASTECTOMY  1972    b/l   • BREAST BIOPSY  1970's    x 5   • ABDOMINAL EXPLORATION     • EYE SURGERY     • OPEN REDUCTION              Family History   Problem Relation Age of Onset   • Arthritis Mother    • Cancer Father    • Heart Disease Father    • Hypertension Father    • Diabetes Maternal Aunt    • Cancer Maternal Uncle    • Heart Disease Maternal Grandmother    • Diabetes Maternal Grandfather    • Genetic Paternal Grandmother    • Heart Disease Paternal Grandfather    • Hyperlipidemia Paternal Grandfather    • Diabetes Maternal Aunt    • Genetic Maternal Uncle    • Cancer Paternal Aunt    • Cancer Paternal Aunt    • Cancer Paternal Uncle   "           reports that she quit smoking about 40 years ago. She quit after 34.00 years of use. She quit smokeless tobacco use about 40 years ago. She reports that she drinks alcohol. She reports that she does not use drugs.             Physical Exam     /70   Pulse 71   Temp 37.2 °C (99 °F)   Ht 1.626 m (5' 4\")   Wt 52.7 kg (116 lb 3.2 oz)   SpO2 98%   BMI 19.95 kg/m²     GEN: well-appearing, NAD  Head: no focal alopecia, no hair thinning  ENT: no conjunctival injection, no oropharyngeal lesions/ulcers  Lymph:  No cervical or supraclavicular LAD  CV: nml S1, S2, no murmurs, RRR  Pulm: normal chest expansion, speaking in full sentences no wheezing  SPINE: Cervical:nontender, mnl ROM Thoracic: non-tender, normal alignment  Lumbar: + tenderness and normal alignment   NEURO:  AOx3  MUSCUSKELETAL:  no edema, dactylitis, entesitis     SHOULDERSno tenderness  ELBOW:  no tenderness no synovitis       WRISTS:   no tenderness no synovitis     MCPS:   no tenderness no synovitis  PIPS:    + tenderness and bony hypertrophy of blateral 3rd PIPs, + tenderness of bilateral 5th PIPs  DIPS: + tenderness with mild bony enlargement  KNEES:  no tenderness no synovitis              ANKLES:  no tenderness no synovitis           MTPS: no tenderness  IP TOES: no tenderness no synovitis  SKIN: no rashes, no skin changes, no nail changes. No periungual erythema.    Labs:    Results for orders placed or performed during the hospital encounter of 08/29/18   VITAMIN D,25 HYDROXY   Result Value Ref Range    25-Hydroxy   Vitamin D 25 28 (L) 30 - 100 ng/mL   CBC WITHOUT DIFFERENTIAL   Result Value Ref Range    WBC 3.7 (L) 4.8 - 10.8 K/uL    RBC 4.90 4.20 - 5.40 M/uL    Hemoglobin 15.4 12.0 - 16.0 g/dL    Hematocrit 46.5 37.0 - 47.0 %    MCV 94.9 81.4 - 97.8 fL    MCH 31.4 27.0 - 33.0 pg    MCHC 33.1 (L) 33.6 - 35.0 g/dL    RDW 44.0 35.9 - 50.0 fL    Platelet Count 244 164 - 446 K/uL    MPV 9.5 9.0 - 12.9 fL   COMP METABOLIC PANEL "   Result Value Ref Range    Sodium 136 135 - 145 mmol/L    Potassium 4.0 3.6 - 5.5 mmol/L    Chloride 102 96 - 112 mmol/L    Co2 26 20 - 33 mmol/L    Anion Gap 8.0 0.0 - 11.9    Glucose 86 65 - 99 mg/dL    Bun 28 (H) 8 - 22 mg/dL    Creatinine 0.85 0.50 - 1.40 mg/dL    Calcium 9.6 8.5 - 10.5 mg/dL    AST(SGOT) 25 12 - 45 U/L    ALT(SGPT) 14 2 - 50 U/L    Alkaline Phosphatase 58 30 - 99 U/L    Total Bilirubin 0.6 0.1 - 1.5 mg/dL    Albumin 4.6 3.2 - 4.9 g/dL    Total Protein 7.6 6.0 - 8.2 g/dL    Globulin 3.0 1.9 - 3.5 g/dL    A-G Ratio 1.5 g/dL   ESTIMATED GFR   Result Value Ref Range    GFR If African American >60 >60 mL/min/1.73 m 2    GFR If Non African American >60 >60 mL/min/1.73 m 2         Assessment / Plan:    // SLE- recent ANAs negative x4 (last done 8/2017), prior dx based on labs, photosensitive skin rash, and joint pains. Has had prolonged steroid use, 3-4 years of Plaquenil, and intolerance to MTX due to infections. Does have intermittent leukopenia and low C4.  No hx of renal, cardiac, or cerebral involvement.  Quiescent today and possibly in remission however will continue to monitor.  - No need for addtional systemic therapy at this time, will likely need to avoid Plaquenil given retinal issues as below or would need ophtho clearance if indicated in the future.  // Sjogren's - reports she thinks she had prior salivary gland biopsy, negative SSA/SSB  // Osteoporosis - dx of fragility fracture of bilateral hips and left wrist, s/p 6 months of Forteo 8 years ago.  Recently started on Alendronate by PCP but has not started yet (denies dysphagia)  // Secondary OA  // Hx of retinal hemorrhage right eye - per ophtho may be secondary to long standing SLE with vascular damage, no signs of active SLE in eye as of 8/2018  // Leukopenia- intermittent in past, mainly ne  // Fibromyalgia    - Repeat CBC and check ESR, CRP, C3, C4, and UA now prior to next visit  - Check JOSÉ ANTONIO and SPEP, if JOSÉ ANTONIO positive will order  further JEANNA panel  - Obtain bilateral hand xrays  - Referral to OT for hand OA  - Recommended Tylenol OTC and menthol/salycialte cream as needed  - Check TSH, Vit D, and PTH for evaluation of secondary causes of osteoporosis  - Obtain baseline DEXA and lumbar and thoracic plain films to eval for compression fractures  - Agree with initiation of Alendronate, risks, benefits and side effects discussed including osteonecrosis of the jaw, atypical fractures, and esophageal complications. Discussed correct use.  Patient able to swallow pills without difficulty.  - Patient will continue regular dental f/u for Sjogren's and have send bisphosphonate clearance, aware to continue at least annually  - Continue close follow up with ophthalmology  - Will send consult note to referring provider/PCP.        Patient will be in contact with me for any questions or concerns, will otherwise follow up as scheduled in 4 months.        Olga Garrett MD

## 2018-12-20 ENCOUNTER — OFFICE VISIT (OUTPATIENT)
Dept: MEDICAL GROUP | Facility: MEDICAL CENTER | Age: 78
End: 2018-12-20
Payer: MEDICARE

## 2018-12-20 ENCOUNTER — TELEPHONE (OUTPATIENT)
Dept: MEDICAL GROUP | Facility: MEDICAL CENTER | Age: 78
End: 2018-12-20

## 2018-12-20 VITALS
HEIGHT: 64 IN | HEART RATE: 68 BPM | SYSTOLIC BLOOD PRESSURE: 162 MMHG | BODY MASS INDEX: 20.32 KG/M2 | WEIGHT: 119 LBS | DIASTOLIC BLOOD PRESSURE: 88 MMHG | OXYGEN SATURATION: 96 % | TEMPERATURE: 98.6 F | RESPIRATION RATE: 16 BRPM

## 2018-12-20 DIAGNOSIS — G89.29 CHRONIC RIGHT-SIDED LOW BACK PAIN WITH RIGHT-SIDED SCIATICA: ICD-10-CM

## 2018-12-20 DIAGNOSIS — M81.8 OTHER OSTEOPOROSIS WITHOUT CURRENT PATHOLOGICAL FRACTURE: ICD-10-CM

## 2018-12-20 DIAGNOSIS — E78.00 PURE HYPERCHOLESTEROLEMIA: ICD-10-CM

## 2018-12-20 DIAGNOSIS — I10 ESSENTIAL HYPERTENSION: ICD-10-CM

## 2018-12-20 DIAGNOSIS — M32.0 DRUG-INDUCED SYSTEMIC LUPUS ERYTHEMATOSUS, UNSPECIFIED ORGAN INVOLVEMENT STATUS (HCC): ICD-10-CM

## 2018-12-20 DIAGNOSIS — M19.93 SECONDARY OSTEOARTHRITIS: ICD-10-CM

## 2018-12-20 DIAGNOSIS — R35.0 FREQUENT URINATION: ICD-10-CM

## 2018-12-20 DIAGNOSIS — M54.41 CHRONIC RIGHT-SIDED LOW BACK PAIN WITH RIGHT-SIDED SCIATICA: ICD-10-CM

## 2018-12-20 LAB
APPEARANCE UR: CLEAR
BILIRUB UR STRIP-MCNC: NORMAL MG/DL
COLOR UR AUTO: YELLOW
GLUCOSE UR STRIP.AUTO-MCNC: NORMAL MG/DL
KETONES UR STRIP.AUTO-MCNC: NORMAL MG/DL
LEUKOCYTE ESTERASE UR QL STRIP.AUTO: NORMAL
NITRITE UR QL STRIP.AUTO: NORMAL
PH UR STRIP.AUTO: 7 [PH] (ref 5–8)
PROT UR QL STRIP: NORMAL MG/DL
RBC UR QL AUTO: NORMAL
SP GR UR STRIP.AUTO: 1.01
UROBILINOGEN UR STRIP-MCNC: 0.2 MG/DL

## 2018-12-20 PROCEDURE — 99214 OFFICE O/P EST MOD 30 MIN: CPT | Performed by: INTERNAL MEDICINE

## 2018-12-20 PROCEDURE — 81002 URINALYSIS NONAUTO W/O SCOPE: CPT | Performed by: INTERNAL MEDICINE

## 2018-12-20 RX ORDER — TRIAMTERENE AND HYDROCHLOROTHIAZIDE 37.5; 25 MG/1; MG/1
1 CAPSULE ORAL EVERY MORNING
Qty: 30 CAP | Refills: 3 | Status: SHIPPED | OUTPATIENT
Start: 2018-12-20 | End: 2019-05-02

## 2018-12-20 RX ORDER — TOPIRAMATE 100 MG/1
100 TABLET, FILM COATED ORAL 2 TIMES DAILY
Qty: 180 TAB | Refills: 2 | Status: SHIPPED | OUTPATIENT
Start: 2018-12-20 | End: 2021-06-10

## 2018-12-20 NOTE — PROGRESS NOTES
Chief Complaint   Patient presents with   • Lupus     follow up     Chief complaint: 6-month follow-up of osteoporosis, lupus.  New complaint of frequent urination    HISTORY OF PRESENT ILLNESS: Patient is a 78 y.o. female patient who presents today to discuss the evaluation and management of:          1. Secondary osteoarthritis    Patient 1 to let me know that she is likely to have a left hip replacement done this year.  She fractured her hip several years ago and has pins, but continues to have pain.    2. Chronic right-sided low back pain with right-sided sciatica    Patient had a mini surgery in October on her lumbar spine with discectomy she is very pleased with how she is feeling after that.    3. Drug-induced systemic lupus erythematosus, unspecified organ involvement status (HCC)    Patient saw  as a new patient in September, she has follow-up next month.  She states she had lab work done, however I am unable to locate the results.  I had ordered lab work in August, at that time CBC, metabolic panel, vitamin D were normal with the exception of mild neutropenia.  She is not currently on any medication or steroids.    4. Frequent urination    Patient is complaining of frequent urination.  She had an E. coli UTI last year.    5. Pure hypercholesterolemia    Patient states she was told by previous doctor that she had high cholesterol and should be on atorvastatin.  She stopped it over 1 year ago however, as she was not sure if it was contributing to some of her joint aches from her lupus.    6. Essential hypertension    Patient has had several elevated blood pressure readings recently including today in the office.  She was treated with a low-dose diuretic in the past and did well with it.  She has been stressed out recently because of some family dynamics.    7. Other osteoporosis without current pathological fracture    Last DEXA scan in 2010, at that time she had osteoporosis.  I prescribed Fosamax  for her, however after taking 1 dose she developed stomach upset and is not taking it.        Patient Active Problem List    Diagnosis Date Noted   • Frequent urination 12/20/2018   • Pure hypercholesterolemia 12/20/2018   • Essential hypertension 12/20/2018   • Secondary osteoarthritis 09/12/2018   • Recurrent major depressive disorder, in partial remission (HCC) 06/26/2018   • Fibromyalgia 05/17/2018   • Other osteoporosis without current pathological fracture 11/03/2017   • Vitamin D deficiency 11/03/2017   • Chronic right-sided low back pain with sciatica 08/03/2017   • Gtz's esophagus without dysplasia 06/29/2017   • Reactive airway disease without complication 06/29/2017   • Migraine without aura    • Lupus (systemic lupus erythematosus) ([facility]) 07/14/2011   • Sjogren's syndrome ([facility]) 07/14/2011   • Aortic valve disease 07/14/2011   • Lichen planus 07/14/2011        Allergies:Ciprofloxacin; Other drug; and Sulfa drugs    Current meds including changes today  Current Outpatient Prescriptions   Medication Sig Dispense Refill   • triamterene/hctz (MAXZIDE-25/DYAZIDE) 37.5-25 MG Cap Take 1 Cap by mouth every morning. 30 Cap 3   • topiramate (TOPAMAX) 100 MG Tab Take 1 Tab by mouth 2 times a day. 180 Tab 2   • lidocaine (XYLOCAINE) 5 % Ointment      • triamcinolone acetonide (KENALOG) 0.1 % Ointment      • ALPRAZolam (XANAX) 0.5 MG Tab Take 0.5 mg by mouth at bedtime as needed for Sleep.     • Fluvoxamine Maleate 100 MG CAPSULE SR 24 HR Take 100 mg by mouth every bedtime.     • ALPRAZolam (XANAX) 0.25 MG Tab Take 0.25 mg by mouth at bedtime as needed.     • albuterol 108 (90 Base) MCG/ACT Aero Soln inhalation aerosol Inhale 2 Puffs by mouth every 6 hours as needed for Shortness of Breath. 8.5 g 2   • NON SPECIFIED Shingrex vaccine, IM deltoid 1 Each 0   • coenzyme Q-10 30 MG capsule Take 60 mg by mouth every day.     • Turmeric, Curcuma Longa, (CURCUMIN) Powder by Does not apply route.     • aspirin EC (ECOTRIN)  "325 MG Tablet Delayed Response Take 1 Tab by mouth every day. 40 Tab 0   • GLUCOSAMINE-CHONDROITIN PO Take  by mouth.     • FOLIC ACID PO Take  by mouth every day.     • Hyoscyamine Sulfate (LEVSIN PO) Take  by mouth as needed.     • BIOTIN 5000 PO Take  by mouth every day.     • Multiple Vitamin (MULTIVITAMIN PO) Take  by mouth every day.     • naproxen (NAPROSYN) 375 MG TABS Take 375 mg by mouth 2 times a day, with meals.       No current facility-administered medications for this visit.      Social History   Substance Use Topics   • Smoking status: Former Smoker     Years: 34.00     Quit date: 1/28/1978   • Smokeless tobacco: Former User     Quit date: 5/27/1978   • Alcohol use Yes      Comment: Rarely     Social History     Social History Narrative   • No narrative on file       Family History   Problem Relation Age of Onset   • Arthritis Mother    • Cancer Father    • Heart Disease Father    • Hypertension Father    • Diabetes Maternal Aunt    • Cancer Maternal Uncle    • Heart Disease Maternal Grandmother    • Diabetes Maternal Grandfather    • Genetic Paternal Grandmother    • Heart Disease Paternal Grandfather    • Hyperlipidemia Paternal Grandfather    • Diabetes Maternal Aunt    • Genetic Maternal Uncle    • Cancer Paternal Aunt    • Cancer Paternal Aunt    • Cancer Paternal Uncle        Review of Systems:  No chest pain, No shortness of breath, No dyspnea on exertion  Gastrointestinal ROS: No abdominal pain, No nausea, vomiting, diarrhea, or constipation        Exam:      Blood pressure (!) 162/88, pulse 68, temperature 37 °C (98.6 °F), temperature source Temporal, resp. rate 16, height 1.626 m (5' 4\"), weight 54 kg (119 lb), SpO2 96 %, not currently breastfeeding.  General:  Well nourished, well developed female in NAD affect and mood within normal limits  Head is grossly normal.  Neck: Supple without adenopathy  Pulmonary: Clear to ausculation.  Normal effort. No rales, rhonchi, or " wheezing.  Cardiovascular: Regular rate and rhythm without murmur.   Extremities: Trace ankle and pedal edema  Neuro: moves all extremities symmetrically    Please note that this dictation was created using voice recognition software. I have made every reasonable attempt to correct obvious errors, but I expect that there are errors of grammar and possibly content that I did not discover before finalizing the note.    Assessment/Plan:  1. Secondary osteoarthritis        2. Chronic right-sided low back pain with right-sided sciatica    Improved after surgery    3. Drug-induced systemic lupus erythematosus, unspecified organ involvement status (HCC)    Has follow-up with rheumatology    4. Frequent urination      - POCT Urinalysis    5. Pure hypercholesterolemia      - Lipid Profile; Future    6. Essential hypertension      - triamterene/hctz (MAXZIDE-25/DYAZIDE) 37.5-25 MG Cap; Take 1 Cap by mouth every morning.  Dispense: 30 Cap; Refill: 3    7. Other osteoporosis without current pathological fracture    -If patient's bone density has decreased further, we can try for prior authorization for Reclast injection.  - DS-BONE DENSITY STUDY (DEXA); Future    Followup: Return in about 4 weeks (around 1/17/2019).

## 2019-01-18 ENCOUNTER — APPOINTMENT (OUTPATIENT)
Dept: RADIOLOGY | Facility: MEDICAL CENTER | Age: 79
End: 2019-01-18
Attending: INTERNAL MEDICINE
Payer: MEDICARE

## 2019-02-19 ENCOUNTER — OFFICE VISIT (OUTPATIENT)
Dept: MEDICAL GROUP | Facility: MEDICAL CENTER | Age: 79
End: 2019-02-19
Payer: MEDICARE

## 2019-02-19 VITALS
TEMPERATURE: 98.1 F | WEIGHT: 120.6 LBS | RESPIRATION RATE: 20 BRPM | HEART RATE: 68 BPM | SYSTOLIC BLOOD PRESSURE: 130 MMHG | DIASTOLIC BLOOD PRESSURE: 80 MMHG | BODY MASS INDEX: 20.59 KG/M2 | HEIGHT: 64 IN | OXYGEN SATURATION: 97 %

## 2019-02-19 DIAGNOSIS — J45.30 MILD PERSISTENT REACTIVE AIRWAY DISEASE WITHOUT COMPLICATION: ICD-10-CM

## 2019-02-19 DIAGNOSIS — R05.9 COUGH IN ADULT: ICD-10-CM

## 2019-02-19 PROCEDURE — 99213 OFFICE O/P EST LOW 20 MIN: CPT | Performed by: INTERNAL MEDICINE

## 2019-02-19 RX ORDER — PROMETHAZINE HYDROCHLORIDE, PHENYLEPHRINE HYDROCHLORIDE AND CODEINE PHOSPHATE 6.25; 5; 1 MG/5ML; MG/5ML; MG/5ML
5 SOLUTION ORAL EVERY 8 HOURS PRN
Qty: 140 ML | Refills: 0 | Status: SHIPPED | OUTPATIENT
Start: 2019-02-19 | End: 2019-03-05 | Stop reason: SDUPTHER

## 2019-02-19 RX ORDER — FLUTICASONE PROPIONATE 220 UG/1
1 AEROSOL, METERED RESPIRATORY (INHALATION) 2 TIMES DAILY
Qty: 1 INHALER | Refills: 3 | Status: SHIPPED | OUTPATIENT
Start: 2019-02-19 | End: 2019-05-02

## 2019-02-19 NOTE — PROGRESS NOTES
Chief Complaint   Patient presents with   • URI     chest congestion,follow from      Chief complaint: Sinus and chest congestion times almost 3 weeks    HISTORY OF PRESENT ILLNESS: Patient is a 78 y.o. female patient who presents today to discuss the evaluation and management of:          1. Cough in adult    Patient was seen at [facility] urgent [facility] 2/7/19, for evaluation of a one-week history of cough, sinus congestion, and malaise.  She had a chest x-ray which showed hyperinflation, otherwise clear.  She was treated with a Z-Krzysztof, Medrol Dosepak, Tessalon Perles.  She completed all of her prescriptions, and has been taking over-the-counter Mucinex, and DayQuil but continues to have sinus congestion and cough.  She denies fever, or purulent sputum or nasal drainage.  Her cough is keeping her up at night.  2. Mild persistent reactive airway disease without complication    Patient has a 35-pack-year history, she quit over 30 years ago.  She has been using a Ventolin inhaler regularly.        Patient Active Problem List    Diagnosis Date Noted   • Cough in adult 02/19/2019   • Frequent urination 12/20/2018   • Pure hypercholesterolemia 12/20/2018   • Essential hypertension 12/20/2018   • Secondary osteoarthritis 09/12/2018   • Recurrent major depressive disorder, in partial remission (HCC) 06/26/2018   • Fibromyalgia 05/17/2018   • Other osteoporosis without current pathological fracture 11/03/2017   • Vitamin D deficiency 11/03/2017   • Chronic right-sided low back pain with sciatica 08/03/2017   • Gtz's esophagus without dysplasia 06/29/2017   • Reactive airway disease without complication 06/29/2017   • Migraine without aura    • Lupus (systemic lupus erythematosus) ([facility]) 07/14/2011   • Sjogren's syndrome ([facility]) 07/14/2011   • Aortic valve disease 07/14/2011   • Lichen planus 07/14/2011        Allergies:Ciprofloxacin; Other drug; and Sulfa drugs    Current meds including changes today  Current Outpatient  Prescriptions   Medication Sig Dispense Refill   • fluticasone (FLOVENT HFA) 220 MCG/ACT Aerosol Inhale 1 Puff by mouth 2 times a day. 1 Inhaler 3   • Promethazine-Phenyleph-Codeine (PHENERGAN VC CODEINE) 6.25-5-10 MG/5ML Syrup Take 5 mL by mouth every 8 hours as needed for up to 14 days. 140 mL 0   • topiramate (TOPAMAX) 100 MG Tab Take 1 Tab by mouth 2 times a day. 180 Tab 2   • triamterene/hctz (MAXZIDE-25/DYAZIDE) 37.5-25 MG Cap Take 1 Cap by mouth every morning. 30 Cap 3   • lidocaine (XYLOCAINE) 5 % Ointment      • triamcinolone acetonide (KENALOG) 0.1 % Ointment      • ALPRAZolam (XANAX) 0.5 MG Tab Take 0.5 mg by mouth at bedtime as needed for Sleep.     • Fluvoxamine Maleate 100 MG CAPSULE SR 24 HR Take 100 mg by mouth every bedtime.     • ALPRAZolam (XANAX) 0.25 MG Tab Take 0.25 mg by mouth at bedtime as needed.     • albuterol 108 (90 Base) MCG/ACT Aero Soln inhalation aerosol Inhale 2 Puffs by mouth every 6 hours as needed for Shortness of Breath. 8.5 g 2   • NON SPECIFIED Shingrex vaccine, IM deltoid 1 Each 0   • coenzyme Q-10 30 MG capsule Take 60 mg by mouth every day.     • Turmeric, Curcuma Longa, (CURCUMIN) Powder by Does not apply route.     • aspirin EC (ECOTRIN) 325 MG Tablet Delayed Response Take 1 Tab by mouth every day. 40 Tab 0   • GLUCOSAMINE-CHONDROITIN PO Take  by mouth.     • FOLIC ACID PO Take  by mouth every day.     • Hyoscyamine Sulfate (LEVSIN PO) Take  by mouth as needed.     • BIOTIN 5000 PO Take  by mouth every day.     • Multiple Vitamin (MULTIVITAMIN PO) Take  by mouth every day.     • naproxen (NAPROSYN) 375 MG TABS Take 375 mg by mouth 2 times a day, with meals.       No current facility-administered medications for this visit.      Social History   Substance Use Topics   • Smoking status: Former Smoker     Years: 34.00     Quit date: 1/28/1978   • Smokeless tobacco: Former User     Quit date: 5/27/1978   • Alcohol use Yes      Comment: Rarely     Social History     Social  "History Narrative   • No narrative on file       Family History   Problem Relation Age of Onset   • Arthritis Mother    • Cancer Father    • Heart Disease Father    • Hypertension Father    • Diabetes Maternal Aunt    • Cancer Maternal Uncle    • Heart Disease Maternal Grandmother    • Diabetes Maternal Grandfather    • Genetic Paternal Grandmother    • Heart Disease Paternal Grandfather    • Hyperlipidemia Paternal Grandfather    • Diabetes Maternal Aunt    • Genetic Maternal Uncle    • Cancer Paternal Aunt    • Cancer Paternal Aunt    • Cancer Paternal Uncle        Review of Systems:  No chest pain, No shortness of breath, No dyspnea on exertion  Gastrointestinal ROS: No abdominal pain, No nausea, vomiting, diarrhea, or constipation        Exam:      Blood pressure 130/80, pulse 68, temperature 36.7 °C (98.1 °F), temperature source Temporal, resp. rate 20, height 1.626 m (5' 4\"), weight 54.7 kg (120 lb 9.6 oz), SpO2 97 %, not currently breastfeeding.  General: Slender female in NAD affect and mood within normal limits  Head is grossly normal.  Neck: Supple without adenopathy  Pulmonary: Clear to ausculation.  Normal effort. No rales, rhonchi, or wheezing.  Cardiovascular: Regular rate and rhythm without murmur.   Extremities: no clubbing, cyanosis, or edema.  Neuro: moves all extremities symmetrically    Please note that this dictation was created using voice recognition software. I have made every reasonable attempt to correct obvious errors, but I expect that there are errors of grammar and possibly content that I did not discover before finalizing the note.    Assessment/Plan:  1. Cough in adult    -Patient has failed antibiotics and steroids, she likely has post viral bronchospasm.  We will try steroid inhaler, cough suppression so she can sleep at night.  -I will see her in follow-up in a couple of weeks, she is going to have a DEXA scan done prior to that.  - fluticasone (FLOVENT HFA) 220 MCG/ACT Aerosol; " Inhale 1 Puff by mouth 2 times a day.  Dispense: 1 Inhaler; Refill: 3  - Promethazine-Phenyleph-Codeine (PHENERGAN VC CODEINE) 6.25-5-10 MG/5ML Syrup; Take 5 mL by mouth every 8 hours as needed for up to 14 days.  Dispense: 140 mL; Refill: 0    2. Mild persistent reactive airway disease without complication        Followup: Return in about 2 weeks (around 3/5/2019).

## 2019-02-27 ENCOUNTER — APPOINTMENT (OUTPATIENT)
Dept: RADIOLOGY | Facility: MEDICAL CENTER | Age: 79
End: 2019-02-27
Attending: INTERNAL MEDICINE
Payer: MEDICARE

## 2019-03-05 ENCOUNTER — OFFICE VISIT (OUTPATIENT)
Dept: MEDICAL GROUP | Facility: MEDICAL CENTER | Age: 79
End: 2019-03-05
Payer: MEDICARE

## 2019-03-05 VITALS
WEIGHT: 123 LBS | HEIGHT: 64 IN | HEART RATE: 68 BPM | BODY MASS INDEX: 21 KG/M2 | RESPIRATION RATE: 16 BRPM | SYSTOLIC BLOOD PRESSURE: 130 MMHG | OXYGEN SATURATION: 94 % | TEMPERATURE: 98 F | DIASTOLIC BLOOD PRESSURE: 78 MMHG

## 2019-03-05 DIAGNOSIS — I10 ESSENTIAL HYPERTENSION: ICD-10-CM

## 2019-03-05 DIAGNOSIS — R05.9 COUGH IN ADULT: ICD-10-CM

## 2019-03-05 DIAGNOSIS — K22.70 BARRETT'S ESOPHAGUS WITHOUT DYSPLASIA: ICD-10-CM

## 2019-03-05 DIAGNOSIS — M81.8 OTHER OSTEOPOROSIS WITHOUT CURRENT PATHOLOGICAL FRACTURE: ICD-10-CM

## 2019-03-05 PROBLEM — R35.0 FREQUENT URINATION: Status: RESOLVED | Noted: 2018-12-20 | Resolved: 2019-03-05

## 2019-03-05 PROCEDURE — 99214 OFFICE O/P EST MOD 30 MIN: CPT | Performed by: INTERNAL MEDICINE

## 2019-03-05 RX ORDER — RANITIDINE 150 MG/1
150 TABLET ORAL 2 TIMES DAILY
COMMUNITY
End: 2021-06-10

## 2019-03-05 RX ORDER — PROMETHAZINE HYDROCHLORIDE, PHENYLEPHRINE HYDROCHLORIDE AND CODEINE PHOSPHATE 6.25; 5; 1 MG/5ML; MG/5ML; MG/5ML
5 SOLUTION ORAL EVERY 8 HOURS PRN
Qty: 200 ML | Refills: 0 | Status: SHIPPED
Start: 2019-03-05 | End: 2019-03-15 | Stop reason: SDUPTHER

## 2019-03-05 NOTE — PROGRESS NOTES
Chief Complaint   Patient presents with   • Cough     wants chest xray      Chief complaint: 2-week follow-up of cough, osteoporosis    HISTORY OF PRESENT ILLNESS: Patient is a 78 y.o. female patient who presents today to discuss the evaluation and management of:          1. Cough in adult    Patient was treated at [place] several weeks ago with steroids, antibiotics.  Chest x-ray showed hyperinflation.  I prescribed Flovent steroid inhaler for her, however it was over $200 therefore she did not fill.  I also gave her prescription for cough syrup with codeine, which is helping significantly especially at night.    2. Other osteoporosis without current pathological fracture    Patient had DEXA scan in 2010 which revealed osteoporosis with T score minus 3.2 left femur, she had one dose of Forteo (we think) however suffered a hip fracture after that, and it was discontinued.  She has not had a DEXA scan since.  We tried oral Fosamax, however it upset her stomach.  She has a repeat bone density scheduled for Friday.    3. Essential hypertension    When I saw the patient several weeks ago I started her on Dyazide 37.5/25.  She has been taking this without side effect.  Her blood pressure is much better today.    4. Gtz's esophagus without dysplasia    Patient is currently taking ranitidine 150 mg twice daily.  She did not tolerate PPI.  If her cough continues, she may see her GI again, it has been 3 years since her last upper endoscopy.        Patient Active Problem List    Diagnosis Date Noted   • Cough in adult 02/19/2019   • Pure hypercholesterolemia 12/20/2018   • Essential hypertension 12/20/2018   • Secondary osteoarthritis 09/12/2018   • Recurrent major depressive disorder, in partial remission (HCC) 06/26/2018   • Fibromyalgia 05/17/2018   • Other osteoporosis without current pathological fracture 11/03/2017   • Vitamin D deficiency 11/03/2017   • Chronic right-sided low back pain with sciatica 08/03/2017    • Gtz's esophagus without dysplasia 06/29/2017   • Reactive airway disease without complication 06/29/2017   • Migraine without aura    • Lupus (systemic lupus erythematosus) ([facility]) 07/14/2011   • Sjogren's syndrome ([facility]) 07/14/2011   • Aortic valve disease 07/14/2011   • Lichen planus 07/14/2011        Allergies:Ciprofloxacin; Other drug; and Sulfa drugs    Current meds including changes today  Current Outpatient Prescriptions   Medication Sig Dispense Refill   • Promethazine-Phenyleph-Codeine (PHENERGAN VC CODEINE) 6.25-5-10 MG/5ML Syrup Take 5 mL by mouth every 8 hours as needed for up to 14 days. 200 mL 0   • fluticasone (FLOVENT HFA) 220 MCG/ACT Aerosol Inhale 1 Puff by mouth 2 times a day. 1 Inhaler 3   • topiramate (TOPAMAX) 100 MG Tab Take 1 Tab by mouth 2 times a day. 180 Tab 2   • triamterene/hctz (MAXZIDE-25/DYAZIDE) 37.5-25 MG Cap Take 1 Cap by mouth every morning. 30 Cap 3   • lidocaine (XYLOCAINE) 5 % Ointment      • triamcinolone acetonide (KENALOG) 0.1 % Ointment      • ALPRAZolam (XANAX) 0.5 MG Tab Take 0.5 mg by mouth at bedtime as needed for Sleep.     • Fluvoxamine Maleate 100 MG CAPSULE SR 24 HR Take 100 mg by mouth every bedtime.     • ALPRAZolam (XANAX) 0.25 MG Tab Take 0.25 mg by mouth at bedtime as needed.     • albuterol 108 (90 Base) MCG/ACT Aero Soln inhalation aerosol Inhale 2 Puffs by mouth every 6 hours as needed for Shortness of Breath. 8.5 g 2   • NON SPECIFIED Shingrex vaccine, IM deltoid 1 Each 0   • coenzyme Q-10 30 MG capsule Take 60 mg by mouth every day.     • Turmeric, Curcuma Longa, (CURCUMIN) Powder by Does not apply route.     • aspirin EC (ECOTRIN) 325 MG Tablet Delayed Response Take 1 Tab by mouth every day. 40 Tab 0   • GLUCOSAMINE-CHONDROITIN PO Take  by mouth.     • FOLIC ACID PO Take  by mouth every day.     • Hyoscyamine Sulfate (LEVSIN PO) Take  by mouth as needed.     • BIOTIN 5000 PO Take  by mouth every day.     • Multiple Vitamin (MULTIVITAMIN PO) Take  by  "mouth every day.     • naproxen (NAPROSYN) 375 MG TABS Take 375 mg by mouth 2 times a day, with meals.       No current facility-administered medications for this visit.      Social History   Substance Use Topics   • Smoking status: Former Smoker     Years: 34.00     Quit date: 1/28/1978   • Smokeless tobacco: Former User     Quit date: 5/27/1978   • Alcohol use Yes      Comment: Rarely     Social History     Social History Narrative   • No narrative on file       Family History   Problem Relation Age of Onset   • Arthritis Mother    • Cancer Father    • Heart Disease Father    • Hypertension Father    • Diabetes Maternal Aunt    • Cancer Maternal Uncle    • Heart Disease Maternal Grandmother    • Diabetes Maternal Grandfather    • Genetic Paternal Grandmother    • Heart Disease Paternal Grandfather    • Hyperlipidemia Paternal Grandfather    • Diabetes Maternal Aunt    • Genetic Maternal Uncle    • Cancer Paternal Aunt    • Cancer Paternal Aunt    • Cancer Paternal Uncle        Review of Systems:  No chest pain, No shortness of breath, No dyspnea on exertion.  Cough as above  Gastrointestinal ROS: No abdominal pain, No nausea, vomiting, diarrhea, or constipation        Exam:      Blood pressure 130/78, pulse 68, temperature 36.7 °C (98 °F), temperature source Temporal, resp. rate 16, height 1.626 m (5' 4\"), weight 55.8 kg (123 lb), SpO2 94 %, not currently breastfeeding.  General: Slender female in NAD affect and mood within normal limits  Head is grossly normal.  Neck: Supple without adenopathy  Pulmonary: Clear to ausculation.  Normal effort. No rales, rhonchi, or wheezing.  Cardiovascular: Regular rate and rhythm without murmur.   Extremities: no clubbing, cyanosis, or edema.  Neuro: moves all extremities symmetrically    Please note that this dictation was created using voice recognition software. I have made every reasonable attempt to correct obvious errors, but I expect that there are errors of grammar and " possibly content that I did not discover before finalizing the note.    Assessment/Plan:  1. Cough in adult    -Suspect patient has mild COPD, previous chest x-ray showed mild inflation, she may also have some allergic component.  She is requesting chest x-ray.  - DX-CHEST-2 VIEWS; Future  - Promethazine-Phenyleph-Codeine (PHENERGAN VC CODEINE) 6.25-5-10 MG/5ML Syrup; Take 5 mL by mouth every 8 hours as needed for up to 14 days.  Dispense: 200 mL; Refill: 0    2. Other osteoporosis without current pathological fracture    If patient's DEXA scan shows osteoporosis, patient is agreeable to IV infusion such as Prolia.  She was unable to tolerate oral biphosphonate    3. Essential hypertension    BP well controlled on Dyazide    4. Gtz's esophagus without dysplasia    Continue ranitidine    Followup: No Follow-up on file.

## 2019-03-08 ENCOUNTER — HOSPITAL ENCOUNTER (OUTPATIENT)
Dept: RADIOLOGY | Facility: MEDICAL CENTER | Age: 79
End: 2019-03-08
Attending: INTERNAL MEDICINE
Payer: MEDICARE

## 2019-03-08 DIAGNOSIS — R05.9 COUGH IN ADULT: ICD-10-CM

## 2019-03-08 DIAGNOSIS — Z12.31 VISIT FOR SCREENING MAMMOGRAM: ICD-10-CM

## 2019-03-08 DIAGNOSIS — M81.8 OTHER OSTEOPOROSIS WITHOUT CURRENT PATHOLOGICAL FRACTURE: ICD-10-CM

## 2019-03-08 PROCEDURE — 77080 DXA BONE DENSITY AXIAL: CPT

## 2019-03-08 PROCEDURE — 71046 X-RAY EXAM CHEST 2 VIEWS: CPT

## 2019-03-08 PROCEDURE — 77067 SCR MAMMO BI INCL CAD: CPT

## 2019-03-15 DIAGNOSIS — R05.9 COUGH IN ADULT: ICD-10-CM

## 2019-03-15 NOTE — TELEPHONE ENCOUNTER
, Pt did not  prescription as both [facility] did not carry that particular brand. [facility] on [address] does. I did call to verify if Pt already filled script there and she had not.  also ran and verified Pt had not filled this yet. Please review.

## 2019-03-19 RX ORDER — PROMETHAZINE HYDROCHLORIDE, PHENYLEPHRINE HYDROCHLORIDE AND CODEINE PHOSPHATE 6.25; 5; 1 MG/5ML; MG/5ML; MG/5ML
5 SOLUTION ORAL EVERY 8 HOURS PRN
Qty: 200 ML | Refills: 0
Start: 2019-03-19 | End: 2019-04-02

## 2019-04-05 ENCOUNTER — TELEPHONE (OUTPATIENT)
Dept: MEDICAL GROUP | Facility: MEDICAL CENTER | Age: 79
End: 2019-04-05

## 2019-04-05 NOTE — TELEPHONE ENCOUNTER
"1. Caller Name: Mariama Gonzales                                           Call Back Number: [phone] (home)       Patient approves a detailed voicemail message: N\A    Pt called inquiring about her results for dexa and chest xray. Results were given. Pt was wondering if she should see a pulmonologist as she is still having a \"Productive\" cough. She is also interested in the prolia infusion. Pt aware you will be out of office.  please advise.    "

## 2019-04-06 ENCOUNTER — TELEPHONE (OUTPATIENT)
Dept: MEDICAL GROUP | Facility: MEDICAL CENTER | Age: 79
End: 2019-04-06

## 2019-04-06 DIAGNOSIS — R05.3 CHRONIC COUGH: ICD-10-CM

## 2019-04-06 NOTE — TELEPHONE ENCOUNTER
I sent her a my chart message with the results a month ago! Yes, I think she should take Prolia, will order when I return. I'm not sure what pulmonary would do differently, she was prescribed an inhaler and it was too expensive for her. I am happy to refer if she wants though, will place.

## 2019-04-19 ENCOUNTER — HOSPITAL ENCOUNTER (OUTPATIENT)
Dept: HOSPITAL 8 - CFH | Age: 79
Discharge: HOME | End: 2019-04-19
Attending: INTERNAL MEDICINE
Payer: MEDICARE

## 2019-04-19 DIAGNOSIS — I10: Primary | ICD-10-CM

## 2019-04-19 LAB
ALBUMIN SERPL-MCNC: 4.8 G/DL (ref 3.4–5)
ALP SERPL-CCNC: 78 U/L (ref 45–117)
ALT SERPL-CCNC: 24 U/L (ref 12–78)
ANION GAP SERPL CALC-SCNC: 6 MMOL/L (ref 5–15)
BASOPHILS # BLD AUTO: 0.02 X10^3/UL (ref 0–0.1)
BASOPHILS NFR BLD AUTO: 0 % (ref 0–1)
BILIRUB SERPL-MCNC: 0.9 MG/DL (ref 0.2–1)
CALCIUM SERPL-MCNC: 9.7 MG/DL (ref 8.5–10.1)
CHLORIDE SERPL-SCNC: 91 MMOL/L (ref 98–107)
CREAT SERPL-MCNC: 1.11 MG/DL (ref 0.55–1.02)
EOSINOPHIL # BLD AUTO: 0.05 X10^3/UL (ref 0–0.4)
EOSINOPHIL NFR BLD AUTO: 1 % (ref 1–7)
ERYTHROCYTE [DISTWIDTH] IN BLOOD BY AUTOMATED COUNT: 12.6 % (ref 9.6–15.2)
LYMPHOCYTES # BLD AUTO: 1.37 X10^3/UL (ref 1–3.4)
LYMPHOCYTES NFR BLD AUTO: 27 % (ref 22–44)
MCH RBC QN AUTO: 32.5 PG (ref 27–34.8)
MCHC RBC AUTO-ENTMCNC: 35 G/DL (ref 32.4–35.8)
MCV RBC AUTO: 92.9 FL (ref 80–100)
MD: NO
MONOCYTES # BLD AUTO: 0.39 X10^3/UL (ref 0.2–0.8)
MONOCYTES NFR BLD AUTO: 8 % (ref 2–9)
NEUTROPHILS # BLD AUTO: 3.28 X10^3/UL (ref 1.8–6.8)
NEUTROPHILS NFR BLD AUTO: 64 % (ref 42–75)
PLATELET # BLD AUTO: 290 X10^3/UL (ref 130–400)
PMV BLD AUTO: 7.2 FL (ref 7.4–10.4)
PROT SERPL-MCNC: 8.1 G/DL (ref 6.4–8.2)
RBC # BLD AUTO: 4.76 X10^6/UL (ref 3.82–5.3)

## 2019-04-19 PROCEDURE — 80053 COMPREHEN METABOLIC PANEL: CPT

## 2019-04-19 PROCEDURE — 85025 COMPLETE CBC W/AUTO DIFF WBC: CPT

## 2019-04-19 PROCEDURE — 36415 COLL VENOUS BLD VENIPUNCTURE: CPT

## 2019-04-25 ENCOUNTER — TELEPHONE (OUTPATIENT)
Dept: MEDICAL GROUP | Facility: MEDICAL CENTER | Age: 79
End: 2019-04-25

## 2019-04-25 NOTE — TELEPHONE ENCOUNTER
I was informed that patient had a low sodium level on her blood work last week.  Has anyone contacted her with any instructions?  This should be followed up, I am happy to do it if the ordering physician has not done so yet.

## 2019-04-26 ENCOUNTER — HOSPITAL ENCOUNTER (OUTPATIENT)
Dept: HOSPITAL 8 - CVU | Age: 79
Discharge: HOME | End: 2019-04-26
Attending: INTERNAL MEDICINE
Payer: MEDICARE

## 2019-04-26 DIAGNOSIS — I63.9: ICD-10-CM

## 2019-04-26 DIAGNOSIS — I65.23: Primary | ICD-10-CM

## 2019-04-26 DIAGNOSIS — E78.00: ICD-10-CM

## 2019-04-26 PROCEDURE — 93880 EXTRACRANIAL BILAT STUDY: CPT

## 2019-05-02 ENCOUNTER — OFFICE VISIT (OUTPATIENT)
Dept: MEDICAL GROUP | Facility: MEDICAL CENTER | Age: 79
End: 2019-05-02
Payer: MEDICARE

## 2019-05-02 VITALS
RESPIRATION RATE: 16 BRPM | HEART RATE: 68 BPM | HEIGHT: 64 IN | BODY MASS INDEX: 20.32 KG/M2 | TEMPERATURE: 97.9 F | DIASTOLIC BLOOD PRESSURE: 70 MMHG | WEIGHT: 119.05 LBS | OXYGEN SATURATION: 98 % | SYSTOLIC BLOOD PRESSURE: 140 MMHG

## 2019-05-02 DIAGNOSIS — E87.1 HYPONATREMIA: ICD-10-CM

## 2019-05-02 DIAGNOSIS — M35.00 SJOGREN'S SYNDROME WITHOUT EXTRAGLANDULAR INVOLVEMENT (HCC): ICD-10-CM

## 2019-05-02 DIAGNOSIS — M81.8 OTHER OSTEOPOROSIS WITHOUT CURRENT PATHOLOGICAL FRACTURE: ICD-10-CM

## 2019-05-02 DIAGNOSIS — M32.8 OTHER FORMS OF SYSTEMIC LUPUS ERYTHEMATOSUS, UNSPECIFIED ORGAN INVOLVEMENT STATUS (HCC): ICD-10-CM

## 2019-05-02 PROBLEM — R05.9 COUGH IN ADULT: Status: RESOLVED | Noted: 2019-02-19 | Resolved: 2019-05-02

## 2019-05-02 PROCEDURE — 99214 OFFICE O/P EST MOD 30 MIN: CPT | Performed by: INTERNAL MEDICINE

## 2019-05-02 NOTE — PROGRESS NOTES
Chief Complaint   Patient presents with   • Results     labs     Chief complaint: Follow-up osteoporosis, new diagnosis of hyponatremia.    HISTORY OF PRESENT ILLNESS: Patient is a 78 y.o. female patient who presents today to discuss the evaluation and management of:          1. Hyponatremia    Patient had lab work ordered by her cardiologist, I received a copy of it which showed sodium 124.  Patient states that this is been a problem for her in the past, she drinks copious amount of water due to Sjogren's syndrome and severe dry mouth.  She had been on Dyazide for hypertension, but has not been taking it for a couple of months, therefore that is not contributing to the low sodium.  Patient is asymptomatic.    2. Other forms of systemic lupus erythematosus, unspecified organ involvement status (HCC)    Patient has a long history of lupus, she states that she was treated with steroids long-term for joint pains many years ago.  She is not currently seeing a rheumatologist, as she is not interested in taking any biologic agents or further steroids.  She takes naproxen 375 twice daily for generalized joint pain.  She tore her left rotator cuff raking in her yard, and is going to be consulting with a shoulder surgeon tomorrow.    3. Sjogren's syndrome without extraglandular involvement (HCC)    As above, patient with severe dry mouth.  She drinks water throughout the day and continues to have very dry mouth.  She has tooth decay related to this.    4. Other osteoporosis without current pathological fracture    Likely due to her steroid use the past.  T score in March 2019 -2.5 left hip, -2.9 spine.  She tried Fosamax once, and had some reflux.  She would be very interested in having Reclast injection annually.      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 08/03/2017   • Gtz's esophagus without dysplasia 06/29/2017   • Reactive airway disease without complication 06/29/2017   • Migraine without aura    • Hyponatremia 01/28/2014   • Lupus (systemic lupus erythematosus) ([facility]) 07/14/2011   • Sjogren's syndrome ([facility]) 07/14/2011   • Aortic valve disease 07/14/2011   • Lichen planus 07/14/2011        Allergies:Ciprofloxacin; Other drug; and Sulfa drugs    Current meds including changes today  Current Outpatient Prescriptions   Medication Sig Dispense Refill   • raNITidine (ZANTAC) 150 MG Tab Take 150 mg by mouth 2 times a day.     • topiramate (TOPAMAX) 100 MG Tab Take 1 Tab by mouth 2 times a day. 180 Tab 2   • lidocaine (XYLOCAINE) 5 % Ointment      • triamcinolone acetonide (KENALOG) 0.1 % Ointment      • ALPRAZolam (XANAX) 0.5 MG Tab Take 0.5 mg by mouth at bedtime as needed for Sleep.     • Fluvoxamine Maleate 100 MG CAPSULE SR 24 HR Take 100 mg by mouth every bedtime.     • albuterol 108 (90 Base) MCG/ACT Aero Soln inhalation aerosol Inhale 2 Puffs by mouth every 6 hours as needed for Shortness of Breath. 8.5 g 2   • NON SPECIFIED Shingrex vaccine, IM deltoid 1 Each 0   • coenzyme Q-10 30 MG capsule Take 60 mg by mouth every day.     • Turmeric, Curcuma Longa, (CURCUMIN) Powder by Does not apply route.     • GLUCOSAMINE-CHONDROITIN PO Take  by mouth.     • FOLIC ACID PO Take  by mouth every day.     • Hyoscyamine Sulfate (LEVSIN PO) Take  by mouth as needed.     • BIOTIN 5000 PO Take  by mouth every day.     • Multiple Vitamin (MULTIVITAMIN PO) Take  by mouth every day.     • naproxen (NAPROSYN) 375 MG TABS Take 375 mg by mouth 2 times a day, with meals.       No current facility-administered medications for this visit.      Social  "History   Substance Use Topics   • Smoking status: Former Smoker     Years: 34.00     Quit date: 1/28/1978   • Smokeless tobacco: Former User     Quit date: 5/27/1978   • Alcohol use Yes      Comment: Rarely     Social History     Social History Narrative   • No narrative on file       Family History   Problem Relation Age of Onset   • Arthritis Mother    • Cancer Father    • Heart Disease Father    • Hypertension Father    • Diabetes Maternal Aunt    • Cancer Maternal Uncle    • Heart Disease Maternal Grandmother    • Diabetes Maternal Grandfather    • Genetic Paternal Grandmother    • Heart Disease Paternal Grandfather    • Hyperlipidemia Paternal Grandfather    • Diabetes Maternal Aunt    • Genetic Maternal Uncle    • Cancer Paternal Aunt    • Cancer Paternal Aunt    • Cancer Paternal Uncle        Review of Systems:  No chest pain, No shortness of breath, No dyspnea on exertion  Gastrointestinal ROS: No abdominal pain, No nausea, vomiting, diarrhea, or constipation        Exam:      /70 (BP Location: Right arm, Patient Position: Sitting)   Pulse 68   Temp 36.6 °C (97.9 °F) (Temporal)   Resp 16   Ht 1.626 m (5' 4\")   Wt 54 kg (119 lb 0.8 oz)   SpO2 98%   General:  Well nourished, well developed female in NAD affect and mood within normal limits  Head is grossly normal.  Neck: Supple without adenopathy  Pulmonary: Clear to ausculation.  Normal effort. No rales, rhonchi, or wheezing.  Cardiovascular: Regular rate and rhythm without murmur.   Extremities: no clubbing, cyanosis, or edema.  Neuro: moves all extremities symmetrically    Please note that this dictation was created using voice recognition software. I have made every reasonable attempt to correct obvious errors, but I expect that there are errors of grammar and possibly content that I did not discover before finalizing the note.    Assessment/Plan:  1. Hyponatremia    -Patient is going to try to cut back on her water drinking, will repeat " metabolic panel.  - Basic Metabolic Panel; Future    2. Other forms of systemic lupus erythematosus, unspecified organ involvement status (HCC)    -At this time, her disease seems to be stable.  She has no significant joint complaints today.  All of her blood work was normal 6-month ago with the exception of mild neutropenia.    3. Sjogren's syndrome without extraglandular involvement (HCC)    I recommend Xylimelt tablets, which can be purchased on [employer] for dry mouth.  She is going to try these.    4. Other osteoporosis without current pathological fracture    Will order Reclast, 5 mg IV q. Year.  The incidence of osteonecrosis is extremely low with this, most cases have been associated with its use more frequently for patients with hypercalcemia of malignancy.    Patient was advised I will be moving to [place] this summer, she will seek to establish with an alternative PCP for follow-up.    Followup: No Follow-up on file.

## 2019-08-21 ENCOUNTER — HOSPITAL ENCOUNTER (OUTPATIENT)
Dept: LAB | Facility: MEDICAL CENTER | Age: 79
End: 2019-08-21
Attending: PSYCHIATRY & NEUROLOGY
Payer: MEDICARE

## 2019-08-21 ENCOUNTER — HOSPITAL ENCOUNTER (OUTPATIENT)
Dept: LAB | Facility: MEDICAL CENTER | Age: 79
End: 2019-08-21
Attending: INTERNAL MEDICINE
Payer: MEDICARE

## 2019-08-21 DIAGNOSIS — M32.19 SYSTEMIC LUPUS ERYTHEMATOSUS WITH OTHER ORGAN INVOLVEMENT, UNSPECIFIED SLE TYPE (HCC): ICD-10-CM

## 2019-08-21 DIAGNOSIS — M81.8 OTHER OSTEOPOROSIS WITHOUT CURRENT PATHOLOGICAL FRACTURE: ICD-10-CM

## 2019-08-21 DIAGNOSIS — M35.00 SJOGREN'S SYNDROME, WITH UNSPECIFIED ORGAN INVOLVEMENT (HCC): ICD-10-CM

## 2019-08-21 DIAGNOSIS — E55.9 VITAMIN D DEFICIENCY: ICD-10-CM

## 2019-08-21 LAB
25(OH)D3 SERPL-MCNC: 17 NG/ML (ref 30–100)
25(OH)D3 SERPL-MCNC: 21 NG/ML (ref 30–100)
APPEARANCE UR: CLEAR
BACTERIA #/AREA URNS HPF: ABNORMAL /HPF
BASOPHILS # BLD AUTO: 0.9 % (ref 0–1.8)
BASOPHILS # BLD AUTO: 1.3 % (ref 0–1.8)
BASOPHILS # BLD: 0.04 K/UL (ref 0–0.12)
BASOPHILS # BLD: 0.06 K/UL (ref 0–0.12)
BILIRUB UR QL STRIP.AUTO: NEGATIVE
C3 SERPL-MCNC: 100 MG/DL (ref 87–200)
C4 SERPL-MCNC: 17 MG/DL (ref 19–52)
COLOR UR: YELLOW
CRP SERPL HS-MCNC: 0.14 MG/DL (ref 0–0.75)
EOSINOPHIL # BLD AUTO: 0.21 K/UL (ref 0–0.51)
EOSINOPHIL # BLD AUTO: 0.21 K/UL (ref 0–0.51)
EOSINOPHIL NFR BLD: 4.6 % (ref 0–6.9)
EOSINOPHIL NFR BLD: 4.9 % (ref 0–6.9)
EPI CELLS #/AREA URNS HPF: NEGATIVE /HPF
ERYTHROCYTE [DISTWIDTH] IN BLOOD BY AUTOMATED COUNT: 44.7 FL (ref 35.9–50)
ERYTHROCYTE [DISTWIDTH] IN BLOOD BY AUTOMATED COUNT: 45.6 FL (ref 35.9–50)
ERYTHROCYTE [SEDIMENTATION RATE] IN BLOOD BY WESTERGREN METHOD: 8 MM/HOUR (ref 0–30)
GLUCOSE UR STRIP.AUTO-MCNC: NEGATIVE MG/DL
HCT VFR BLD AUTO: 43.2 % (ref 37–47)
HCT VFR BLD AUTO: 44.9 % (ref 37–47)
HGB BLD-MCNC: 13.9 G/DL (ref 12–16)
HGB BLD-MCNC: 14 G/DL (ref 12–16)
HYALINE CASTS #/AREA URNS LPF: ABNORMAL /LPF
IMM GRANULOCYTES # BLD AUTO: 0 K/UL (ref 0–0.11)
IMM GRANULOCYTES # BLD AUTO: 0.01 K/UL (ref 0–0.11)
IMM GRANULOCYTES NFR BLD AUTO: 0 % (ref 0–0.9)
IMM GRANULOCYTES NFR BLD AUTO: 0.2 % (ref 0–0.9)
KETONES UR STRIP.AUTO-MCNC: NEGATIVE MG/DL
LEUKOCYTE ESTERASE UR QL STRIP.AUTO: ABNORMAL
LYMPHOCYTES # BLD AUTO: 1.3 K/UL (ref 1–4.8)
LYMPHOCYTES # BLD AUTO: 1.44 K/UL (ref 1–4.8)
LYMPHOCYTES NFR BLD: 30.4 % (ref 22–41)
LYMPHOCYTES NFR BLD: 31.4 % (ref 22–41)
MCH RBC QN AUTO: 31.7 PG (ref 27–33)
MCH RBC QN AUTO: 32.7 PG (ref 27–33)
MCHC RBC AUTO-ENTMCNC: 31 G/DL (ref 33.6–35)
MCHC RBC AUTO-ENTMCNC: 32.4 G/DL (ref 33.6–35)
MCV RBC AUTO: 100.9 FL (ref 81.4–97.8)
MCV RBC AUTO: 102.3 FL (ref 81.4–97.8)
MICRO URNS: ABNORMAL
MONOCYTES # BLD AUTO: 0.38 K/UL (ref 0–0.85)
MONOCYTES # BLD AUTO: 0.41 K/UL (ref 0–0.85)
MONOCYTES NFR BLD AUTO: 8.9 % (ref 0–13.4)
MONOCYTES NFR BLD AUTO: 8.9 % (ref 0–13.4)
NEUTROPHILS # BLD AUTO: 2.34 K/UL (ref 2–7.15)
NEUTROPHILS # BLD AUTO: 2.47 K/UL (ref 2–7.15)
NEUTROPHILS NFR BLD: 53.8 % (ref 44–72)
NEUTROPHILS NFR BLD: 54.7 % (ref 44–72)
NITRITE UR QL STRIP.AUTO: NEGATIVE
NRBC # BLD AUTO: 0 K/UL
NRBC # BLD AUTO: 0 K/UL
NRBC BLD-RTO: 0 /100 WBC
NRBC BLD-RTO: 0 /100 WBC
PH UR STRIP.AUTO: 7 [PH] (ref 5–8)
PLATELET # BLD AUTO: 238 K/UL (ref 164–446)
PLATELET # BLD AUTO: 259 K/UL (ref 164–446)
PMV BLD AUTO: 9.4 FL (ref 9–12.9)
PMV BLD AUTO: 9.5 FL (ref 9–12.9)
PROT UR QL STRIP: NEGATIVE MG/DL
PTH-INTACT SERPL-MCNC: 74.8 PG/ML (ref 14–72)
RBC # BLD AUTO: 4.28 M/UL (ref 4.2–5.4)
RBC # BLD AUTO: 4.39 M/UL (ref 4.2–5.4)
RBC # URNS HPF: ABNORMAL /HPF
RBC UR QL AUTO: NEGATIVE
SP GR UR STRIP.AUTO: 1.01
T4 FREE SERPL-MCNC: 0.86 NG/DL (ref 0.53–1.43)
TSH SERPL DL<=0.005 MIU/L-ACNC: 2.83 UIU/ML (ref 0.38–5.33)
TSH SERPL DL<=0.005 MIU/L-ACNC: 2.94 UIU/ML (ref 0.38–5.33)
UROBILINOGEN UR STRIP.AUTO-MCNC: 0.2 MG/DL
WBC # BLD AUTO: 4.3 K/UL (ref 4.8–10.8)
WBC # BLD AUTO: 4.6 K/UL (ref 4.8–10.8)
WBC #/AREA URNS HPF: ABNORMAL /HPF

## 2019-08-21 PROCEDURE — 86038 ANTINUCLEAR ANTIBODIES: CPT

## 2019-08-21 PROCEDURE — 85025 COMPLETE CBC W/AUTO DIFF WBC: CPT

## 2019-08-21 PROCEDURE — 36415 COLL VENOUS BLD VENIPUNCTURE: CPT

## 2019-08-21 PROCEDURE — 86140 C-REACTIVE PROTEIN: CPT

## 2019-08-21 PROCEDURE — 84165 PROTEIN E-PHORESIS SERUM: CPT

## 2019-08-21 PROCEDURE — 84443 ASSAY THYROID STIM HORMONE: CPT

## 2019-08-21 PROCEDURE — 81001 URINALYSIS AUTO W/SCOPE: CPT

## 2019-08-21 PROCEDURE — 84160 ASSAY OF PROTEIN ANY SOURCE: CPT

## 2019-08-21 PROCEDURE — 82306 VITAMIN D 25 HYDROXY: CPT | Mod: 91

## 2019-08-21 PROCEDURE — 84439 ASSAY OF FREE THYROXINE: CPT

## 2019-08-21 PROCEDURE — 85025 COMPLETE CBC W/AUTO DIFF WBC: CPT | Mod: 91

## 2019-08-21 PROCEDURE — 85652 RBC SED RATE AUTOMATED: CPT

## 2019-08-21 PROCEDURE — 86160 COMPLEMENT ANTIGEN: CPT

## 2019-08-21 PROCEDURE — 83970 ASSAY OF PARATHORMONE: CPT

## 2019-08-21 PROCEDURE — 84443 ASSAY THYROID STIM HORMONE: CPT | Mod: 91

## 2019-08-21 PROCEDURE — 82306 VITAMIN D 25 HYDROXY: CPT

## 2019-08-23 ENCOUNTER — TELEPHONE (OUTPATIENT)
Dept: RHEUMATOLOGY | Facility: MEDICAL CENTER | Age: 79
End: 2019-08-23

## 2019-08-23 NOTE — TELEPHONE ENCOUNTER
----- Message from Olga Garrett M.D. sent at 8/22/2019  6:38 PM PDT -----  Labs 8/2019 reviewed:  CBC with stable low WBC at 4.3  Vit D low at 17, pth slightly elevated as a result  TSH normal  UA with small number of WBCs- unlikley significant unless pain with urination  C4 17 (prior 15) and C3 100 (prior 88)- improved, lupus markers  ESR and CRP normal- markers of inflammation    I have not seen this patient since 9/2018 nor does she have follow up with me scheduled, please have her schedule an appt

## 2019-08-23 NOTE — TELEPHONE ENCOUNTER
Called patient and left her a voicemail asking her to call us back and schedule an appointment to see you.

## 2019-08-25 LAB — NUCLEAR IGG SER QL IA: NORMAL

## 2019-08-26 LAB
ALBUMIN SERPL ELPH-MCNC: 4.59 G/DL (ref 3.75–5.01)
ALPHA1 GLOB SERPL ELPH-MCNC: 0.25 G/DL (ref 0.19–0.46)
ALPHA2 GLOB SERPL ELPH-MCNC: 0.62 G/DL (ref 0.48–1.05)
B-GLOBULIN SERPL ELPH-MCNC: 0.67 G/DL (ref 0.48–1.1)
GAMMA GLOB SERPL ELPH-MCNC: 0.86 G/DL (ref 0.62–1.51)
INTERPRETATION SERPL IFE-IMP: NORMAL
MONOCLON BAND OBS SERPL: NORMAL
PATHOLOGY STUDY: NORMAL
PROT SERPL-MCNC: 7 G/DL (ref 6.3–8.2)

## 2019-09-11 ENCOUNTER — APPOINTMENT (RX ONLY)
Dept: URBAN - METROPOLITAN AREA CLINIC 20 | Facility: CLINIC | Age: 79
Setting detail: DERMATOLOGY
End: 2019-09-11

## 2019-09-11 DIAGNOSIS — L82.0 INFLAMED SEBORRHEIC KERATOSIS: ICD-10-CM

## 2019-09-11 DIAGNOSIS — D22 MELANOCYTIC NEVI: ICD-10-CM

## 2019-09-11 PROBLEM — D22.5 MELANOCYTIC NEVI OF TRUNK: Status: ACTIVE | Noted: 2019-09-11

## 2019-09-11 PROCEDURE — 17110 DESTRUCTION B9 LES UP TO 14: CPT

## 2019-09-11 PROCEDURE — ? COUNSELING

## 2019-09-11 PROCEDURE — 99212 OFFICE O/P EST SF 10 MIN: CPT | Mod: 25

## 2019-09-11 PROCEDURE — ? LIQUID NITROGEN

## 2019-09-11 ASSESSMENT — LOCATION ZONE DERM
LOCATION ZONE: FACE
LOCATION ZONE: TRUNK

## 2019-09-11 ASSESSMENT — LOCATION SIMPLE DESCRIPTION DERM
LOCATION SIMPLE: RIGHT FOREHEAD
LOCATION SIMPLE: LOWER BACK

## 2019-09-11 ASSESSMENT — LOCATION DETAILED DESCRIPTION DERM
LOCATION DETAILED: RIGHT SUPERIOR MEDIAL FOREHEAD
LOCATION DETAILED: SUPERIOR LUMBAR SPINE

## 2019-09-11 NOTE — HPI: SKIN LESION
Is This A New Presentation, Or A Follow-Up?: Growth
What Type Of Note Output Would You Prefer (Optional)?: Standard Output
How Severe Is Your Skin Lesion?: moderate
Has Your Skin Lesion Been Treated?: not been treated
Additional History: Patient declined full body skin check today

## 2019-09-11 NOTE — PROCEDURE: LIQUID NITROGEN
Render Note In Bullet Format When Appropriate: No
Consent: The patient's consent was obtained including but not limited to risks of crusting, scabbing, blistering, scarring, darker or lighter pigmentary change, recurrence, incomplete removal and infection.
Detail Level: Detailed
Medical Necessity Information: It is in your best interest to select a reason for this procedure from the list below. All of these items fulfill various CMS LCD requirements except the new and changing color options.
Post-Care Instructions: I reviewed with the patient in detail post-care instructions. Patient is to wear sunprotection, and avoid picking at any of the treated lesions. Pt may apply Vaseline to crusted or scabbing areas.
Medical Necessity Clause: This procedure was medically necessary because the lesions that were treated were:
Render Post-Care Instructions In Note?: yes

## 2019-09-13 ENCOUNTER — APPOINTMENT (OUTPATIENT)
Dept: MEDICAL GROUP | Facility: MEDICAL CENTER | Age: 79
End: 2019-09-13
Payer: MEDICARE

## 2019-09-17 ENCOUNTER — APPOINTMENT (OUTPATIENT)
Dept: MEDICAL GROUP | Facility: MEDICAL CENTER | Age: 79
End: 2019-09-17
Payer: MEDICARE

## 2019-09-24 ENCOUNTER — OFFICE VISIT (OUTPATIENT)
Dept: MEDICAL GROUP | Facility: MEDICAL CENTER | Age: 79
End: 2019-09-24
Payer: MEDICARE

## 2019-09-24 DIAGNOSIS — R71.8 ELEVATED MCV: ICD-10-CM

## 2019-09-24 DIAGNOSIS — M32.8 OTHER FORMS OF SYSTEMIC LUPUS ERYTHEMATOSUS, UNSPECIFIED ORGAN INVOLVEMENT STATUS (HCC): ICD-10-CM

## 2019-09-24 DIAGNOSIS — R53.83 OTHER FATIGUE: ICD-10-CM

## 2019-09-24 DIAGNOSIS — R42 DIZZINESS: ICD-10-CM

## 2019-09-24 PROCEDURE — 99214 OFFICE O/P EST MOD 30 MIN: CPT | Performed by: FAMILY MEDICINE

## 2019-09-24 RX ORDER — ERGOCALCIFEROL 1.25 MG/1
CAPSULE ORAL
COMMUNITY
End: 2024-03-13

## 2019-09-24 RX ORDER — CYANOCOBALAMIN 1000 UG/ML
1000 INJECTION, SOLUTION INTRAMUSCULAR; SUBCUTANEOUS ONCE
Status: COMPLETED | OUTPATIENT
Start: 2019-09-24 | End: 2019-09-24

## 2019-09-24 RX ADMIN — CYANOCOBALAMIN 1000 MCG: 1000 INJECTION, SOLUTION INTRAMUSCULAR; SUBCUTANEOUS at 14:13

## 2019-09-24 ASSESSMENT — PATIENT HEALTH QUESTIONNAIRE - PHQ9
SUM OF ALL RESPONSES TO PHQ QUESTIONS 1-9: 7
5. POOR APPETITE OR OVEREATING: NOT AT ALL
8. MOVING OR SPEAKING SO SLOWLY THAT OTHER PEOPLE COULD HAVE NOTICED. OR THE OPPOSITE, BEING SO FIGETY OR RESTLESS THAT YOU HAVE BEEN MOVING AROUND A LOT MORE THAN USUAL: NOT AT ALL
7. TROUBLE CONCENTRATING ON THINGS, SUCH AS READING THE NEWSPAPER OR WATCHING TELEVISION: NOT AT ALL
4. FEELING TIRED OR HAVING LITTLE ENERGY: NEARLY EVERY DAY
9. THOUGHTS THAT YOU WOULD BE BETTER OFF DEAD, OR OF HURTING YOURSELF: NOT AT ALL
1. LITTLE INTEREST OR PLEASURE IN DOING THINGS: NOT AT ALL
2. FEELING DOWN, DEPRESSED, IRRITABLE, OR HOPELESS: SEVERAL DAYS
6. FEELING BAD ABOUT YOURSELF - OR THAT YOU ARE A FAILURE OR HAVE LET YOURSELF OR YOUR FAMILY DOWN: NOT AL ALL
SUM OF ALL RESPONSES TO PHQ9 QUESTIONS 1 AND 2: 1
3. TROUBLE FALLING OR STAYING ASLEEP OR SLEEPING TOO MUCH: NEARLY EVERY DAY

## 2019-09-24 NOTE — ASSESSMENT & PLAN NOTE
New problem. Has the issue with various head movements. Particularly when turning to the right. She reports some lightheadedness as well. This does not occur with standing.    125/62  Denies any chest pain.  She does have a h/o mitral vavle prolapse.   + palpitations.   Gets sweating/clammy.   No time or activity association but only occurs when she's walking.

## 2019-09-24 NOTE — Clinical Note
Abiel Zimmer, Can you please call this patient to let her know that I have ordered a CMP as this was not done with her other lab work and should have been repeated?Thanks, Dr. Goddard

## 2019-09-26 VITALS
HEIGHT: 64 IN | HEART RATE: 83 BPM | DIASTOLIC BLOOD PRESSURE: 62 MMHG | OXYGEN SATURATION: 99 % | BODY MASS INDEX: 20.96 KG/M2 | SYSTOLIC BLOOD PRESSURE: 125 MMHG | TEMPERATURE: 98 F | WEIGHT: 122.8 LBS

## 2019-09-26 NOTE — PROGRESS NOTES
Subjective:     CC: Diagnoses of Other forms of systemic lupus erythematosus, unspecified organ involvement status (HCC), Dizziness, Other fatigue, and Elevated MCV were pertinent to this visit.    HPI: Patient is a 79 y.o. female established patient who presents today to discuss labs and possible lupus flare. She is a patient's of Dr. Camacho and has not yet established with a new provider.     Lupus (systemic lupus erythematosus)  Chronic problem. The patient reports she has a long history of SLE. She is not currently on medication for this. She reports that she feels like she is having a flare which includes swelling in the face and hands, stiff joints and significant fatigue. He labs were recently done, ordered by her rheumatologist, however, she cannot get in to see her until November. Her labs are generally quite good and do not like a lupus flare. Lab include UA (shows only scan WBCs), TSH (wnl), Vit D (slightly low), CBC (elevated MCV, otherwise normal), JOSÉ ANTONIO (negative), CRP (negative), ESR (negative), SPEP (looks normal), Complement levels. Labs were done 8/21/19.       Dizziness  New problem. Has the issue with various head movements. Particularly when turning to the right. She reports some lightheadedness as well. This does not occur with standing but only while she is walking.  BP is 125/62 today.  Denies any chest pain.  She does have a h/o mitral vavle prolapse and aortic valve disease.  She also reports some palpitations sometimes in association with the dizziness although on every time..   She also Gets sweating/clammy when this happens. .   No time or activity association but only occurs when she's walking.   She does have a cardiologist that she sees about once per year.     Other fatigue  New problem. Patient assumed it may be due to lupus flare. Labs are generally within normal limits although MCV is elevated. Denies etoh use at all. Very fatigued.           Past Medical History:   Diagnosis Date    • Allergy, unspecified not elsewhere classified    • Anxiety    • Arthritis    • Blood transfusion, without reported diagnosis    • CATARACT    • Dental disorder     lichen planus   • Depression    • Fibromyalgia    • GERD (gastroesophageal reflux disease)    • GERD (gastroesophageal reflux disease)    • Headache(784.0)    • Headache, classical migraine    • Kidney calculi    • Lichen planus    • Lupus (HCC)    • Migraine without aura, without mention of intractable migraine without mention of status migrainosus    • Mitral valve prolapse    • Osteoporosis, unspecified    • Other convulsions     Isolated seizure    • Pain     joints, muscles   • Psychiatric problem     depression   • Shingles 10/4/11    dr velasco aware of this back of head   • Sjogren's syndrome (HCC)    • Snoring    • Unspecified asthma(493.90)    • Urinary tract infection, site not specified        Social History     Tobacco Use   • Smoking status: Former Smoker     Years: 34.00     Last attempt to quit: 1978     Years since quittin.6   • Smokeless tobacco: Former User     Quit date: 1978   Substance Use Topics   • Alcohol use: Yes     Comment: Rarely   • Drug use: No       Current Outpatient Medications Ordered in Epic   Medication Sig Dispense Refill   • vitamin D, Ergocalciferol, (DRISDOL) 99035 units Cap capsule Take  by mouth every 7 days.     • topiramate (TOPAMAX) 100 MG Tab Take 1 Tab by mouth 2 times a day. 180 Tab 2   • lidocaine (XYLOCAINE) 5 % Ointment      • ALPRAZolam (XANAX) 0.5 MG Tab Take 0.5 mg by mouth at bedtime as needed for Sleep.     • Fluvoxamine Maleate 100 MG CAPSULE SR 24 HR Take 100 mg by mouth every bedtime.     • albuterol 108 (90 Base) MCG/ACT Aero Soln inhalation aerosol Inhale 2 Puffs by mouth every 6 hours as needed for Shortness of Breath. 8.5 g 2   • coenzyme Q-10 30 MG capsule Take 60 mg by mouth every day.     • Turmeric, Curcuma Longa, (CURCUMIN) Powder by Does not apply route.    "  • GLUCOSAMINE-CHONDROITIN PO Take  by mouth.     • Hyoscyamine Sulfate (LEVSIN PO) Take  by mouth as needed.     • BIOTIN 5000 PO Take  by mouth every day.     • Multiple Vitamin (MULTIVITAMIN PO) Take  by mouth every day.     • naproxen (NAPROSYN) 375 MG TABS Take 375 mg by mouth 2 times a day, with meals.     • raNITidine (ZANTAC) 150 MG Tab Take 150 mg by mouth 2 times a day.     • triamcinolone acetonide (KENALOG) 0.1 % Ointment      • NON SPECIFIED Shingrex vaccine, IM deltoid (Patient not taking: Reported on 9/24/2019) 1 Each 0   • FOLIC ACID PO Take  by mouth every day.       No current [facility]-ordered facility-administered medications on file.        Allergies:  Ciprofloxacin; Other drug; and Sulfa drugs    ROS:  Pulm: no sob, no cough  : no dysuria        Objective:       Exam:  /62 (BP Location: Right arm, Patient Position: Sitting, BP Cuff Size: Adult)   Pulse 83   Temp 36.7 °C (98 °F) (Temporal)   Ht 1.626 m (5' 4\")   Wt 55.7 kg (122 lb 12.7 oz)   SpO2 99%   BMI 21.08 kg/m²  Body mass index is 21.08 kg/m².      General: Normal appearing. No distress.  HEAD: NCAT  EYES: conjunctiva clear, lids without ptosis, pupils equal  and reactive to light  EARS: ears normal shape and contour  MOUTH: oropharynx is without erythema, edema or exudates.   Neck: Supple without masses. Thyroid is not enlarged. Normal ROM  Pulmonary: Clear to ausculation.  Normal effort. No rales, ronchi, or wheezing.  Cardiovascular: Regular rate and rhythm, no murmur. No LE edema  Neurologic: Grossly normal, no focal deficits  Lymph: No cervical or supraclavicular lymph nodes are palpable  Skin: Warm and dry.  No obvious lesions.  Musculoskeletal: Normal gait and station. Fingers do appear swollen, no redness, but significantly decreased ROM.   Psych: Normal mood and affect. Alert and oriented x3. Judgment and insight is normal.      Labs: 8/21/19 Results reviewed and discussed with the patient, questions " answered.    Assessment & Plan:     79 y.o. female with the following -     1. Other forms of systemic lupus erythematosus, unspecified organ involvement status (HCC)  Chronic problem, uncontrolled. Patient has symptoms of lupus flare, however, labs look good. She is hesitant to be put on any medications, especially steroids. I did advise that she sent a message to her rheumatologist and describe her symptoms to get further clarification given that she won't be able to be seen for the next couple months.     2. Dizziness  New problem. As above, labs are generally normal. Advised patient take her BP when this problem occurs. I've also advised she be seen by her cardiologist as she does have a cardiac history and the problem occurs during exertion. She should likely have a stress test. She states she will call him later today.   - Comp Metabolic Panel; Future    3. Other fatigue  New problem. As above, concern for cardiac cause. However, MCV was elevated on labs and patient denies any ETOH use. Disucssed checking B12 vs. Just giving B12 injection. She elects for injection to see if this helps with her symptoms. Plan to f/u in 1 month.   - cyanocobalamin (VITAMIN B-12) injection 1,000 mcg  - Comp Metabolic Panel; Future    4. Elevated MCV  New problem. See above.   - cyanocobalamin (VITAMIN B-12) injection 1,000 mcg      Return in about 4 weeks (around 10/22/2019).    Please note that this dictation was created using voice recognition software. I have made every reasonable attempt to correct obvious errors, but I expect that there are errors of grammar and possibly content that I did not discover before finalizing the note.

## 2019-09-26 NOTE — ASSESSMENT & PLAN NOTE
Chronic problem. The patient reports she has a long history of SLE. She is not currently on medication for this. She reports that she feels like she is having a flare which includes swelling in the face and hands, stiff joints and significant fatigue. He labs were recently done, ordered by her rheumatologist, however, she cannot get in to see her until November. Her labs are generally quite good and do not like a lupus flare.

## 2019-10-24 ENCOUNTER — OFFICE VISIT (OUTPATIENT)
Dept: RHEUMATOLOGY | Facility: MEDICAL CENTER | Age: 79
End: 2019-10-24
Payer: MEDICARE

## 2019-10-24 VITALS
SYSTOLIC BLOOD PRESSURE: 136 MMHG | HEART RATE: 78 BPM | BODY MASS INDEX: 21.23 KG/M2 | DIASTOLIC BLOOD PRESSURE: 74 MMHG | HEIGHT: 64 IN | WEIGHT: 124.34 LBS | OXYGEN SATURATION: 95 % | TEMPERATURE: 98.4 F

## 2019-10-24 DIAGNOSIS — M35.00 SJOGREN'S SYNDROME, WITH UNSPECIFIED ORGAN INVOLVEMENT (HCC): ICD-10-CM

## 2019-10-24 DIAGNOSIS — Z86.69: ICD-10-CM

## 2019-10-24 DIAGNOSIS — D72.819 LEUKOPENIA, UNSPECIFIED TYPE: ICD-10-CM

## 2019-10-24 DIAGNOSIS — M79.7 FIBROMYALGIA: ICD-10-CM

## 2019-10-24 DIAGNOSIS — M32.19 SYSTEMIC LUPUS ERYTHEMATOSUS WITH OTHER ORGAN INVOLVEMENT, UNSPECIFIED SLE TYPE (HCC): Primary | ICD-10-CM

## 2019-10-24 DIAGNOSIS — M81.8 OTHER OSTEOPOROSIS WITHOUT CURRENT PATHOLOGICAL FRACTURE: ICD-10-CM

## 2019-10-24 DIAGNOSIS — Z79.899 LONG-TERM USE OF PLAQUENIL: ICD-10-CM

## 2019-10-24 DIAGNOSIS — M19.93 SECONDARY OSTEOARTHRITIS: ICD-10-CM

## 2019-10-24 PROCEDURE — 99214 OFFICE O/P EST MOD 30 MIN: CPT | Performed by: INTERNAL MEDICINE

## 2019-10-24 NOTE — PROGRESS NOTES
RHEUMATOLOGY CLINIC FOLLOW UP NOTE        Chief complaint: follow up arthritis, lupus and Sjogren's        HPI:  80 y/o F with depression, migraines, osteoporosis, and reported SLE and Sjogren's presents today for follow up of SLE and Sjogren's, last seen by me as a new patient 9/2018.    Since the last visit she did tear her left rotator cuff, treating with injections and PT.  Trying to avoid surgery.  Also having some right hip pain in area of prior surgery as well as right sided sciatica with failed procedure last year.    She reports she continues to have pain of her hands mainly over her DIPs.  Denies any joint swelling.  She does endorse continued diffuse myalgias as well as fatigue.  She does endorse difficulty sleeping and will admit to taking a half of a Xanax at night to help sleep or and Advil PM.  She has had no fevers, oral ulcers, weight loss, or night sweats.  She does continue to have dry eyes and dry mouth, follows regularly with her dentist and her ophthalmologist.  Unable to tolerate Restasis.  She does use Biotene mouth products.  She does admit to a rash that she gets around her nasolabial folds and a continued rash that she will get when exposed to sun mainly on her chest.        Rheum Background:  Seen by me as a new patient 9/2018 as referred by her PCP for SLE and Sjogren's.   Per the patient she was dx with SLE in 1990 based on periodic rash on her arms that left scars and joint pain.  She took Plaquenil around this time for 3-4 years, stopped as she says it was not working.  She took prednisone for about 15 years (about 10-50mg daily) and MTX she only took 2 doses, she reports severe lung infections and UTIs with both MTX and steroids.  Otherwise, she had only taken anti-inflammatories.  She reports that the skin starting clearing up and has not had rashes on her arms for the past 10 years. She does still note joint pain and feels she still gets a malar rash intermittently (not present  now). She reports right eye cental vision loss for the past 3 years, she was seen by ophtho who feels it may be from longstanding lupus.       Her joint pain is mainly of her hands (PIPs and DIPs) associated with using her hands and she is now having trouble making fists as a result of the deformities. Denies red, hot swollen joints.  She does still have 2 hours of morning stiffness.  She also has chronic back pain for which she has seen pain management, has dx of spinal stenosis with right sided sciatica.  Plan for possible minimally invasive surgery in October. She has felt feverish over the last few weeks but no known fevers.  Has chronic cough.     She endorses photosensitivity, dry eyes, dry mouth, oral ulcers (however dx as lichen planus) and Raynaud's.  No hx of serositis or renal involvement of lupus. Denies night sweats or unexplained weight loss.      No hx of blood clots, or strokes.  Had 2 miscarriages in past, one at 3 months and one at 5 months.     Patient reports hx of bilateral hip fractures and left arm fracture from ground level falls.  She reports 8 years ago she took 6 months of Forteo then stopped, has recent prescribed Alendronate by PCP but has not started yet.                Labs:  JOSÉ ANTONIO negative x 4, SSA,  CCP, RF negative  C4 low, C3 normal (lower end of normal)  ESR/CRP normal  UA without blood or protein  8/2017:  HCV ab, Hep A, hep B surface ag and core IgM negative     Imaging:     CT abd/pelvis 6/2017:  1.  Mild left pelvocaliectasis. Nonspecific finding but could be related to infection.  2.  No right hydronephrosis.  3.  Gallbladder is not well demonstrated.  4.  Diverticula of the colon. No evidence diverticulitis. No free fluid.  5.  The appendix is not delineated.     - On plaquenil for about 3-4 years  methotrexate steroids cause kidney and lung infections      Osteoporosis:  Patient reports hx of bilateral hip fractures and left arm fracture from ground level falls.  She reports  8 years ago she took 6 months of Forteo then stopped, has recent prescribed Alendronate by PCP but has not started yet.          ROS:    GEN: denies fevers, night sweats,weight loss   ENT: denies change in vision, denies oral ulcer  CV:  no palpitations, no chest dyscomfort  Pulm: no dyspnea, no cough  Skin: no rashes currently  MS: Per HPI            OUTPATIENT MEDS:    Prior to Admission medications    Medication Sig Start Date End Date Taking? Authorizing Provider   vitamin D, Ergocalciferol, (DRISDOL) 02805 units Cap capsule Take  by mouth every 7 days.    Physician Outpatient   raNITidine (ZANTAC) 150 MG Tab Take 150 mg by mouth 2 times a day.    Physician Outpatient   topiramate (TOPAMAX) 100 MG Tab Take 1 Tab by mouth 2 times a day. 12/20/18   Haydee Camacho M.D.   lidocaine (XYLOCAINE) 5 % Ointment  7/2/18   Physician Outpatient   triamcinolone acetonide (KENALOG) 0.1 % Ointment  6/27/18   Physician Outpatient   ALPRAZolam (XANAX) 0.5 MG Tab Take 0.5 mg by mouth at bedtime as needed for Sleep.    Physician Outpatient   Fluvoxamine Maleate 100 MG CAPSULE SR 24 HR Take 100 mg by mouth every bedtime.    Physician Outpatient   albuterol 108 (90 Base) MCG/ACT Aero Soln inhalation aerosol Inhale 2 Puffs by mouth every 6 hours as needed for Shortness of Breath. 6/26/18   Haydee Camacho M.D.   NON SPECIFIED Shingrex vaccine, IM deltoid  Patient not taking: Reported on 9/24/2019 5/17/18   Siomara Miguel P.A.-C.   coenzyme Q-10 30 MG capsule Take 60 mg by mouth every day.    Physician Outpatient   Turmeric, Curcuma Longa, (CURCUMIN) Powder by Does not apply route.    Physician Outpatient   GLUCOSAMINE-CHONDROITIN PO Take  by mouth.    Physician Outpatient   FOLIC ACID PO Take  by mouth every day.    Physician Outpatient   Hyoscyamine Sulfate (LEVSIN PO) Take  by mouth as needed. 7/14/11   Physician Outpatient   BIOTIN 5000 PO Take  by mouth every day.    Physician Outpatient   Multiple Vitamin  "(MULTIVITAMIN PO) Take  by mouth every day.    Physician Outpatient   naproxen (NAPROSYN) 375 MG TABS Take 375 mg by mouth 2 times a day, with meals.    Physician Outpatient           Past Medical History:   Diagnosis Date   • Allergy, unspecified not elsewhere classified    • Anxiety    • Arthritis    • Blood transfusion, without reported diagnosis    • CATARACT    • Dental disorder     lichen planus   • Depression    • Fibromyalgia    • GERD (gastroesophageal reflux disease)    • GERD (gastroesophageal reflux disease)    • Headache(784.0)    • Headache, classical migraine    • Kidney calculi    • Lichen planus    • Lupus (HCC)    • Migraine without aura, without mention of intractable migraine without mention of status migrainosus    • Mitral valve prolapse    • Osteoporosis, unspecified    • Other convulsions     Isolated seizure March, 2014   • Pain     joints, muscles   • Psychiatric problem     depression   • Shingles 10/4/11    dr velasco aware of this back of head   • Sjogren's syndrome (HCC)    • Snoring    • Unspecified asthma(493.90)    • Urinary tract infection, site not specified             reports that she quit smoking about 41 years ago. She quit after 34.00 years of use. She quit smokeless tobacco use about 41 years ago. She reports that she drinks alcohol. She reports that she does not use drugs.             Physical Exam:     /74 (BP Location: Left arm, Patient Position: Sitting, BP Cuff Size: Adult)   Pulse 78   Temp 36.9 °C (98.4 °F) (Temporal)   Ht 1.626 m (5' 4\")   Wt 56.4 kg (124 lb 5.4 oz)   SpO2 95%   BMI 21.34 kg/m²         GEN: well-appearing, NAD  Head: no focal alopecia, no hair thinning  ENT: no conjunctival icterus or injection, no LAD, no oral ulcers, no parotid enlargement or firmness, tongue is dry and there is minimal saliva pooling  CV: nml S1, S2, no murmurs, RRR  Pulm: normal chest expansion, speaking in full sentences, CTAB, no wheezing  MUSCUSKELETAL:  no edema, " dactylitis, entesitis     TRIGGER POINTS:  Negative unless listed.   SHOULDERs: full ROM no tenderness  ELBOWS:  no tenderness no synovitis  WRISTS:   no tenderness no synovitis  CMC JOINT:  R normal L normal          MCPS:   no tenderness no synovitis  PIPS:   There is swan-neck deformity of left third finger, there is bony enlargement of her right third PIP, no synovitis  DIPS: no tenderness no synovitis  KNEES:  no tenderness no synovitis              ANKLES:  no tenderness no synovitis           SKIN: no rashes, skin changes, nail changes, no periungual erythema.               Labs:    Results for orders placed or performed during the hospital encounter of 08/21/19   CBC WITH DIFFERENTIAL   Result Value Ref Range    WBC 4.6 (L) 4.8 - 10.8 K/uL    RBC 4.39 4.20 - 5.40 M/uL    Hemoglobin 13.9 12.0 - 16.0 g/dL    Hematocrit 44.9 37.0 - 47.0 %    .3 (H) 81.4 - 97.8 fL    MCH 31.7 27.0 - 33.0 pg    MCHC 31.0 (L) 33.6 - 35.0 g/dL    RDW 45.6 35.9 - 50.0 fL    Platelet Count 259 164 - 446 K/uL    MPV 9.4 9.0 - 12.9 fL    Neutrophils-Polys 53.80 44.00 - 72.00 %    Lymphocytes 31.40 22.00 - 41.00 %    Monocytes 8.90 0.00 - 13.40 %    Eosinophils 4.60 0.00 - 6.90 %    Basophils 1.30 0.00 - 1.80 %    Immature Granulocytes 0.00 0.00 - 0.90 %    Nucleated RBC 0.00 /100 WBC    Neutrophils (Absolute) 2.47 2.00 - 7.15 K/uL    Lymphs (Absolute) 1.44 1.00 - 4.80 K/uL    Monos (Absolute) 0.41 0.00 - 0.85 K/uL    Eos (Absolute) 0.21 0.00 - 0.51 K/uL    Baso (Absolute) 0.06 0.00 - 0.12 K/uL    Immature Granulocytes (abs) 0.00 0.00 - 0.11 K/uL    NRBC (Absolute) 0.00 K/uL   VITAMIN D,25 HYDROXY   Result Value Ref Range    25-Hydroxy   Vitamin D 25 21 (L) 30 - 100 ng/mL   TSH   Result Value Ref Range    TSH 2.830 0.380 - 5.330 uIU/mL   FREE THYROXINE   Result Value Ref Range    Free T-4 0.86 0.53 - 1.43 ng/dL         Impression/Plan:     1.  Hx of SLE- JOSÉ ANTONIO recently negative  2. Sjogren's - Prior reported she thinks she had  prior salivary gland biopsy though denies this toay, negative SSA and RF  3.  Leukopenia- intermittent in past  4. Long term Plaquenil    Recent ANAs negative x5 ,prior dx based on labs, photosensitive skin rash, and joint pains. Has had prolonged steroid use of several year and ?3-4 years of Plaquenil, and intolerance to MTX due to infections. Does have intermittent leukopenia and low C4.  No hx of renal, cardiac, or cerebral involvement.  Remains quiescent today and possibly in remission however will continue to monitor.    I discussed with the patient that I feel her joint pains and myalgias are likely due to OA and fibromyalgia as below with no evidence of systemic inflammation or synovitis. No need for addtional systemic therapy at this time, will likely need to avoid Plaquenil given retinal issues as below or would need ophtho clearance if indicated in the future.    Labs 8/2019 reviewed:  CBC with stable low WBC at 4.3  Vit D low at 17, pth slightly elevated   TSH normal  UA with small number of WBCs- unlikley significant unless pain with urination  C4 17 (prior 15) and C3 100 (prior 88)  ESR and CRP normal  JOSÉ ANTONIO negative  SPEP normal    Plan:  - Check SSB with next labs as not done  - Repeat CBC, CMP, ESR, CRP, C3, C4, and immunoglobulins prior to next visit  - Obtain bilateral hand xrays   - Continue close follow up with ophthalmology        4. Osteoporosis - dx of fragility fracture of bilateral hips and left wrist, s/p 6 months of Forteo 8 years ago.  Recently started on Alendronate by PCP     DEXA 3/2019:  T score LS -2.5  T score distal forearm -2.9    TSH normal 8/2019    Vit D low at 17 and PTH slightly elevated 8/2019-started on high-dose vitamin D weekly with PCP and plan for possible Prolia    Never started on alendronate due to fear of side effects.  I discussed with the patient should likely avoid alendronate due to difficulty swallowing.  I did discuss the risk of ONJ and she will obtain  clearance from her dentist prior to starting.  Also discussed the risk of atypical femur fractures.  She plans to follow-up with PCP regarding this matter.        5. Secondary OA  Recommended Tylenol OTC and menthol/salycialte cream as needed  Obtain bilateral hand x-rays now to ensure no other evidence of inflammatory arthritis sequela    6. Hx of retinal hemorrhage right eye - per ophtho may be secondary to long standing SLE with vascular damage, no signs of active SLE in eye as of 8/2018    7. Fibromyalgia  Follow up with PCP  Discussed good sleep hygiene and exercise            Patient will be in contact with me for any questions or concerns, will otherwise follow up with me as scheduled in 6 months or sooner as needed.    This note was generated using voice recognition software which has a small chance of producing errors of grammar and possibly content.  I have made every reasonable attempt to find and correct any obvious errors, but expect that some may not be found prior to finalization of this note.             Olga Garrett MD

## 2019-11-15 ENCOUNTER — HOSPITAL ENCOUNTER (OUTPATIENT)
Dept: RADIOLOGY | Facility: MEDICAL CENTER | Age: 79
End: 2019-11-15
Attending: INTERNAL MEDICINE
Payer: MEDICARE

## 2019-11-15 DIAGNOSIS — M32.19 SYSTEMIC LUPUS ERYTHEMATOSUS WITH OTHER ORGAN INVOLVEMENT, UNSPECIFIED SLE TYPE (HCC): ICD-10-CM

## 2019-11-15 DIAGNOSIS — M19.93 SECONDARY OSTEOARTHRITIS: ICD-10-CM

## 2019-11-15 PROCEDURE — 77077 JOINT SURVEY SINGLE VIEW: CPT

## 2019-12-11 ENCOUNTER — APPOINTMENT (RX ONLY)
Dept: URBAN - METROPOLITAN AREA CLINIC 20 | Facility: CLINIC | Age: 79
Setting detail: DERMATOLOGY
End: 2019-12-11

## 2019-12-11 DIAGNOSIS — L91.8 OTHER HYPERTROPHIC DISORDERS OF THE SKIN: ICD-10-CM

## 2019-12-11 DIAGNOSIS — D22 MELANOCYTIC NEVI: ICD-10-CM

## 2019-12-11 DIAGNOSIS — L56.8 OTHER SPECIFIED ACUTE SKIN CHANGES DUE TO ULTRAVIOLET RADIATION: ICD-10-CM | Status: IMPROVED

## 2019-12-11 DIAGNOSIS — L21.8 OTHER SEBORRHEIC DERMATITIS: ICD-10-CM

## 2019-12-11 DIAGNOSIS — Z85.828 PERSONAL HISTORY OF OTHER MALIGNANT NEOPLASM OF SKIN: ICD-10-CM

## 2019-12-11 DIAGNOSIS — I10 ESSENTIAL HYPERTENSION: ICD-10-CM

## 2019-12-11 DIAGNOSIS — L81.4 OTHER MELANIN HYPERPIGMENTATION: ICD-10-CM

## 2019-12-11 DIAGNOSIS — L82.1 OTHER SEBORRHEIC KERATOSIS: ICD-10-CM

## 2019-12-11 PROBLEM — D22.5 MELANOCYTIC NEVI OF TRUNK: Status: ACTIVE | Noted: 2019-12-11

## 2019-12-11 PROCEDURE — ? COUNSELING

## 2019-12-11 PROCEDURE — 99214 OFFICE O/P EST MOD 30 MIN: CPT | Mod: 25

## 2019-12-11 PROCEDURE — 17110 DESTRUCTION B9 LES UP TO 14: CPT

## 2019-12-11 PROCEDURE — ? LIQUID NITROGEN

## 2019-12-11 PROCEDURE — ? PRESCRIPTION

## 2019-12-11 RX ORDER — TRIAMTERENE AND HYDROCHLOROTHIAZIDE 37.5; 25 MG/1; MG/1
1 CAPSULE ORAL EVERY MORNING
Qty: 30 CAP | Refills: 0 | Status: SHIPPED | OUTPATIENT
Start: 2019-12-11 | End: 2019-12-17 | Stop reason: SDUPTHER

## 2019-12-11 RX ORDER — PIMECROLIMUS 10 MG/G
CREAM TOPICAL BID
Qty: 1 | Refills: 3 | Status: ERX | COMMUNITY
Start: 2019-12-11

## 2019-12-11 RX ADMIN — PIMECROLIMUS: 10 CREAM TOPICAL at 00:00

## 2019-12-11 ASSESSMENT — LOCATION SIMPLE DESCRIPTION DERM
LOCATION SIMPLE: RIGHT UPPER BACK
LOCATION SIMPLE: CHEST
LOCATION SIMPLE: LEFT ANTERIOR NECK
LOCATION SIMPLE: CHEST
LOCATION SIMPLE: LEFT FOREHEAD
LOCATION SIMPLE: LEFT NOSE
LOCATION SIMPLE: RIGHT NOSE
LOCATION SIMPLE: LEFT PRETIBIAL REGION
LOCATION SIMPLE: UPPER BACK
LOCATION SIMPLE: RIGHT FOREARM
LOCATION SIMPLE: LEFT FOREARM
LOCATION SIMPLE: UPPER BACK
LOCATION SIMPLE: RIGHT FOREARM
LOCATION SIMPLE: LEFT LIP

## 2019-12-11 ASSESSMENT — LOCATION ZONE DERM
LOCATION ZONE: NOSE
LOCATION ZONE: TRUNK
LOCATION ZONE: NECK
LOCATION ZONE: LIP
LOCATION ZONE: ARM
LOCATION ZONE: FACE
LOCATION ZONE: LEG
LOCATION ZONE: TRUNK
LOCATION ZONE: ARM

## 2019-12-11 ASSESSMENT — LOCATION DETAILED DESCRIPTION DERM
LOCATION DETAILED: RIGHT VENTRAL PROXIMAL FOREARM
LOCATION DETAILED: LEFT NASAL ALAR GROOVE
LOCATION DETAILED: LEFT DISTAL PRETIBIAL REGION
LOCATION DETAILED: LEFT VENTRAL PROXIMAL FOREARM
LOCATION DETAILED: LEFT INFERIOR VERMILION LIP
LOCATION DETAILED: RIGHT MEDIAL UPPER BACK
LOCATION DETAILED: RIGHT NASAL ALAR GROOVE
LOCATION DETAILED: SUPERIOR THORACIC SPINE
LOCATION DETAILED: LEFT INFERIOR MEDIAL FOREHEAD
LOCATION DETAILED: STERNAL NOTCH
LOCATION DETAILED: LEFT MEDIAL SUPERIOR CHEST
LOCATION DETAILED: SUPERIOR THORACIC SPINE
LOCATION DETAILED: LEFT INFERIOR ANTERIOR NECK
LOCATION DETAILED: RIGHT VENTRAL PROXIMAL FOREARM

## 2019-12-11 NOTE — PROCEDURE: LIQUID NITROGEN
Add 52 Modifier (Optional): no
Detail Level: Detailed
Medical Necessity Information: It is in your best interest to select a reason for this procedure from the list below. All of these items fulfill various CMS LCD requirements except the new and changing color options.
Consent: The patient's consent was obtained including but not limited to risks of crusting, scabbing, blistering, scarring, darker or lighter pigmentary change, recurrence, incomplete removal and infection.
Post-Care Instructions: I reviewed with the patient in detail post-care instructions. Patient is to wear sunprotection, and avoid picking at any of the treated lesions. Pt may apply Vaseline to crusted or scabbing areas.
Medical Necessity Clause: This procedure was medically necessary because the lesions that were treated were:

## 2019-12-11 NOTE — PROCEDURE: MIPS QUALITY
Quality 130: Documentation Of Current Medications In The Medical Record: Current Medications Documented
Detail Level: Detailed
Quality 111:Pneumonia Vaccination Status For Older Adults: Pneumococcal Vaccination Previously Received
Quality 226: Preventive Care And Screening: Tobacco Use: Screening And Cessation Intervention: Patient screened for tobacco use and is an ex/non-smoker

## 2019-12-17 ENCOUNTER — HOSPITAL ENCOUNTER (OUTPATIENT)
Dept: RADIOLOGY | Facility: MEDICAL CENTER | Age: 79
End: 2019-12-17
Attending: FAMILY MEDICINE
Payer: MEDICARE

## 2019-12-17 ENCOUNTER — OFFICE VISIT (OUTPATIENT)
Dept: MEDICAL GROUP | Facility: MEDICAL CENTER | Age: 79
End: 2019-12-17
Payer: MEDICARE

## 2019-12-17 ENCOUNTER — TELEPHONE (OUTPATIENT)
Dept: MEDICAL GROUP | Facility: MEDICAL CENTER | Age: 79
End: 2019-12-17

## 2019-12-17 ENCOUNTER — HOSPITAL ENCOUNTER (OUTPATIENT)
Dept: LAB | Facility: MEDICAL CENTER | Age: 79
End: 2019-12-17
Attending: FAMILY MEDICINE
Payer: MEDICARE

## 2019-12-17 VITALS
HEIGHT: 64 IN | SYSTOLIC BLOOD PRESSURE: 134 MMHG | DIASTOLIC BLOOD PRESSURE: 84 MMHG | BODY MASS INDEX: 21.3 KG/M2 | HEART RATE: 97 BPM | OXYGEN SATURATION: 98 % | WEIGHT: 124.78 LBS | TEMPERATURE: 101.8 F

## 2019-12-17 DIAGNOSIS — R50.9 FEVER, UNSPECIFIED FEVER CAUSE: ICD-10-CM

## 2019-12-17 DIAGNOSIS — R05.9 COUGH: ICD-10-CM

## 2019-12-17 DIAGNOSIS — N20.0 KIDNEY STONES: ICD-10-CM

## 2019-12-17 DIAGNOSIS — M54.50 LEFT-SIDED LOW BACK PAIN WITHOUT SCIATICA, UNSPECIFIED CHRONICITY: ICD-10-CM

## 2019-12-17 DIAGNOSIS — R69 ILLNESS: ICD-10-CM

## 2019-12-17 DIAGNOSIS — R10.32 LEFT LOWER QUADRANT ABDOMINAL PAIN: ICD-10-CM

## 2019-12-17 DIAGNOSIS — R10.32 LEFT LOWER QUADRANT PAIN: ICD-10-CM

## 2019-12-17 DIAGNOSIS — R10.32 LLQ PAIN: ICD-10-CM

## 2019-12-17 LAB
ANION GAP SERPL CALC-SCNC: 10 MMOL/L (ref 0–11.9)
APPEARANCE UR: CLEAR
BILIRUB UR STRIP-MCNC: NEGATIVE MG/DL
BUN SERPL-MCNC: 14 MG/DL (ref 8–22)
CALCIUM SERPL-MCNC: 9.3 MG/DL (ref 8.5–10.5)
CHLORIDE SERPL-SCNC: 101 MMOL/L (ref 96–112)
CO2 SERPL-SCNC: 21 MMOL/L (ref 20–33)
COLOR UR AUTO: YELLOW
CREAT SERPL-MCNC: 0.82 MG/DL (ref 0.5–1.4)
GLUCOSE SERPL-MCNC: 72 MG/DL (ref 65–99)
GLUCOSE UR STRIP.AUTO-MCNC: NEGATIVE MG/DL
KETONES UR STRIP.AUTO-MCNC: NEGATIVE MG/DL
LEUKOCYTE ESTERASE UR QL STRIP.AUTO: NEGATIVE
NITRITE UR QL STRIP.AUTO: NEGATIVE
PH UR STRIP.AUTO: 7 [PH] (ref 5–8)
POTASSIUM SERPL-SCNC: 3.7 MMOL/L (ref 3.6–5.5)
PROT UR QL STRIP: NEGATIVE MG/DL
RBC UR QL AUTO: NEGATIVE
SODIUM SERPL-SCNC: 132 MMOL/L (ref 135–145)
SP GR UR STRIP.AUTO: 1.01
UROBILINOGEN UR STRIP-MCNC: 1 MG/DL

## 2019-12-17 PROCEDURE — 81002 URINALYSIS NONAUTO W/O SCOPE: CPT | Performed by: FAMILY MEDICINE

## 2019-12-17 PROCEDURE — 36415 COLL VENOUS BLD VENIPUNCTURE: CPT

## 2019-12-17 PROCEDURE — 71046 X-RAY EXAM CHEST 2 VIEWS: CPT

## 2019-12-17 PROCEDURE — 80048 BASIC METABOLIC PNL TOTAL CA: CPT

## 2019-12-17 PROCEDURE — 700117 HCHG RX CONTRAST REV CODE 255: Performed by: FAMILY MEDICINE

## 2019-12-17 PROCEDURE — 74178 CT ABD&PLV WO CNTR FLWD CNTR: CPT

## 2019-12-17 PROCEDURE — 99215 OFFICE O/P EST HI 40 MIN: CPT | Performed by: FAMILY MEDICINE

## 2019-12-17 RX ORDER — AZITHROMYCIN 250 MG/1
TABLET, FILM COATED ORAL
Qty: 6 TAB | Refills: 0 | Status: SHIPPED | OUTPATIENT
Start: 2019-12-17 | End: 2021-06-10

## 2019-12-17 RX ORDER — TRIAMTERENE AND HYDROCHLOROTHIAZIDE 37.5; 25 MG/1; MG/1
1 CAPSULE ORAL EVERY MORNING
Qty: 30 CAP | Refills: 0 | Status: SHIPPED | OUTPATIENT
Start: 2019-12-17 | End: 2020-12-01

## 2019-12-17 RX ADMIN — IOHEXOL 100 ML: 350 INJECTION, SOLUTION INTRAVENOUS at 17:30

## 2019-12-17 RX ADMIN — IOHEXOL 50 ML: 240 INJECTION, SOLUTION INTRATHECAL; INTRAVASCULAR; INTRAVENOUS; ORAL at 16:30

## 2019-12-17 NOTE — ASSESSMENT & PLAN NOTE
New problem. Started 5 days ago. Started with ear pain and congestion. Now with cough and some difficulty catching her breath with coughing.   Reports coughing fits with spasms has had some post tussive emesis.   Now having fevers for the past 3 days.   Febrile today at 101.8F  Son had similar symptoms. He is feeling better now.

## 2019-12-17 NOTE — ASSESSMENT & PLAN NOTE
New problem. Started 3 weeks ago. Starts in her left lower quadrant and radiates up to her left CVA.   Unknown if having fevers.   No diarrhea  No blood in the stool.   Has chronic constipation.   Last BM was a least one week ago.   No known h/o diverticulitis.   Pain is worst at night, especially when she lays down on it. Can be as bad as an 8 or 9. Goes down to 6 during the day.   History of kidney

## 2019-12-18 NOTE — PROGRESS NOTES
Subjective:     CC: Diagnoses of Left-sided low back pain without sciatica, unspecified chronicity, Illness, Left lower quadrant pain, Cough, Fever, unspecified fever cause, and Left lower quadrant abdominal pain were pertinent to this visit.    HPI: Patient is a 79 y.o. female established patient who presents today to with concerns regarding the following.  Patient has a past medical history of lupus, Sjogren's and fibromyalgia.       Illness  New problem. Started 5 days ago. Started with ear pain and congestion. Now with cough and some difficulty catching her breath with coughing.   Reports coughing fits with spasms has had some post tussive emesis.   Now having fevers for the past 3 days.   Febrile today at 101.8F  Son had similar symptoms. He is feeling better now.     Left lower quadrant pain  New problem. Started 3 weeks ago. Starts in her left lower quadrant and radiates up to her left CVA.   Unknown if having fevers.   No diarrhea  No blood in the stool.   Has chronic constipation.   Last BM was a least one week ago.   No known h/o diverticulitis.   Pain is worst at night, especially when she lays down on it. Can be as bad as an 8 or 9. Goes down to 6 during the day.   History of kidney      Past Medical History:   Diagnosis Date   • Allergy, unspecified not elsewhere classified    • Anxiety    • Arthritis    • Blood transfusion, without reported diagnosis    • CATARACT    • Dental disorder     lichen planus   • Depression    • Fibromyalgia    • GERD (gastroesophageal reflux disease)    • GERD (gastroesophageal reflux disease)    • Headache(784.0)    • Headache, classical migraine    • Kidney calculi    • Lichen planus    • Lupus (HCC)    • Migraine without aura, without mention of intractable migraine without mention of status migrainosus    • Mitral valve prolapse    • Osteoporosis, unspecified    • Other convulsions     Isolated seizure March, 2014   • Pain     joints, muscles   • Psychiatric problem      depression   • Shingles 10/4/11    dr velasco aware of this back of head   • Sjogren's syndrome (HCC)    • Snoring    • Unspecified asthma(493.90)    • Urinary tract infection, site not specified        Social History     Tobacco Use   • Smoking status: Former Smoker     Years: 34.00     Last attempt to quit: 1978     Years since quittin.9   • Smokeless tobacco: Former User     Quit date: 1978   Substance Use Topics   • Alcohol use: Yes     Comment: Rarely   • Drug use: No       Current Outpatient Medications Ordered in Epic   Medication Sig Dispense Refill   • triamterene/hctz (MAXZIDE-25/DYAZIDE) 37.5-25 MG Cap Take 1 Cap by mouth every morning. 30 Cap 0   • vitamin D, Ergocalciferol, (DRISDOL) 13493 units Cap capsule Take  by mouth every 7 days.     • topiramate (TOPAMAX) 100 MG Tab Take 1 Tab by mouth 2 times a day. 180 Tab 2   • lidocaine (XYLOCAINE) 5 % Ointment      • ALPRAZolam (XANAX) 0.5 MG Tab Take 0.5 mg by mouth at bedtime as needed for Sleep.     • Fluvoxamine Maleate 100 MG CAPSULE SR 24 HR Take 100 mg by mouth every bedtime.     • albuterol 108 (90 Base) MCG/ACT Aero Soln inhalation aerosol Inhale 2 Puffs by mouth every 6 hours as needed for Shortness of Breath. 8.5 g 2   • Turmeric, Curcuma Longa, (CURCUMIN) Powder by Does not apply route.     • GLUCOSAMINE-CHONDROITIN PO Take  by mouth.     • Hyoscyamine Sulfate (LEVSIN PO) Take  by mouth as needed.     • BIOTIN 5000 PO Take  by mouth every day.     • Multiple Vitamin (MULTIVITAMIN PO) Take  by mouth every day.     • naproxen (NAPROSYN) 375 MG TABS Take 375 mg by mouth 2 times a day, with meals.     • raNITidine (ZANTAC) 150 MG Tab Take 150 mg by mouth 2 times a day.     • triamcinolone acetonide (KENALOG) 0.1 % Ointment      • NON SPECIFIED Shingrex vaccine, IM deltoid (Patient not taking: Reported on 2019) 1 Each 0   • coenzyme Q-10 30 MG capsule Take 60 mg by mouth every day.     • FOLIC ACID PO Take  by mouth every day.    "    Current Facility-Administered Medications Ordered in Epic   Medication Dose Route Frequency Provider Last Rate Last Dose   • [COMPLETED] iohexol (OMNIPAQUE) 350 mg/mL  100 mL Intravenous Once Verona Goddard M.D.   100 mL at 12/17/19 1730       Allergies:  Ciprofloxacin; Other drug; and Sulfa drugs    Health Maintenance: Completed    ROS:  CV: no chest pain, no palpitations  GI: no nausea/vomiting, no diarrhea        Objective:       Exam:  /84 (BP Location: Left arm, Patient Position: Sitting, BP Cuff Size: Adult)   Pulse 97   Temp (!) 38.8 °C (101.8 °F) (Temporal)   Ht 1.626 m (5' 4\")   Wt 56.6 kg (124 lb 12.5 oz)   SpO2 98%   BMI 21.42 kg/m²  Body mass index is 21.42 kg/m².      General: Normal appearing.  Ill-appearing, nontoxic.  HEAD: NCAT  EYES: conjunctiva clear, lids without ptosis, pupils equal  and reactive to light  EARS: ears normal shape and contour, canals are clear bilaterally, TMs clear  MOUTH: oropharynx is without erythema, edema or exudates.   Neck: Supple without masses. Thyroid is not enlarged. Normal ROM  Pulmonary: Clear to ausculation.  Increased effort. No rales, ronchi, or wheezing.  Cardiovascular: Regular rate and rhythm, no murmur. No LE edema  Neurologic: Grossly normal, no focal deficits  Lymph: No cervical or supraclavicular lymph nodes are palpable  Skin: Warm and dry.  No obvious lesions.  Musculoskeletal: Normal gait and station.  Tenderness to palpation in the left costovertebral angle and superior to the ASIS.  Psych: Normal mood and affect. Alert and oriented x3. Judgment and insight is normal.  Assessment & Plan:     79 y.o. female with the following -     1. Left-sided low back pain without sciatica, unspecified chronicity  New problem.  Urine analysis was negative.  Patient has significant tenderness to palpation in the left costovertebral angle that wraps around to the left lower quadrant.  She is currently febrile, however, unclear if this is due to her " respiratory illness.  I did advise the patient to present to the ER, however, she declines.  Stat CT with and without contrast was ordered to evaluate for diverticulitis versus nephrolithiasis versus other infectious etiology.  The patient is scheduled for stat CT as well as stat chest x-ray at 5:45 this evening.  - POCT Urinalysis  - CT-ABDOMEN-PELVIS WITH & W/O; Future  - Basic Metabolic Panel; Future    2. Illness  New problem.  The patient does appear somewhat short of breath on exam although has normal oxygen saturation and clear lungs.  Patient has had pneumonia multiple times in the past.  She is currently febrile.  Order placed for stat two-view chest x-ray.  - DX-CHEST-2 VIEWS; Future  - Basic Metabolic Panel; Future    3. Left lower quadrant pain  New problem, see above.    4. Cough  New problem, see above.  - DX-CHEST-2 VIEWS; Future  - Basic Metabolic Panel; Future    5. Fever, unspecified fever cause  New problem, see above.  - DX-CHEST-2 VIEWS; Future  - Basic Metabolic Panel; Future      No follow-ups on file.    Please note that this dictation was created using voice recognition software. I have made every reasonable attempt to correct obvious errors, but I expect that there are errors of grammar and possibly content that I did not discover before finalizing the note.

## 2019-12-18 NOTE — TELEPHONE ENCOUNTER
CXR and CT abd/pel both resulted without concerning findings. Discussed findings with patient. However, given coughing spasms, fever and posttussive emesis, concern for pertussis is high. Rx sent for z-pack. Plan to f/u if no improvement.

## 2020-02-12 ENCOUNTER — HOSPITAL ENCOUNTER (OUTPATIENT)
Dept: HOSPITAL 8 - CFH | Age: 80
Discharge: HOME | End: 2020-02-12
Attending: INTERNAL MEDICINE
Payer: MEDICARE

## 2020-02-12 DIAGNOSIS — I05.1: Primary | ICD-10-CM

## 2020-02-12 DIAGNOSIS — I11.9: ICD-10-CM

## 2020-02-12 PROCEDURE — 93306 TTE W/DOPPLER COMPLETE: CPT

## 2020-03-20 ENCOUNTER — TELEPHONE (OUTPATIENT)
Dept: HEALTH INFORMATION MANAGEMENT | Facility: OTHER | Age: 80
End: 2020-03-20

## 2020-03-20 NOTE — TELEPHONE ENCOUNTER
1. Caller Name: Mariama Gonzales          Call Back Number: [phone]  [facility] PCP or Specialty Provider: Yes Nitza        2.  Does patient have any active symptoms of respiratory illness (fever OR cough OR shortness of breath)? Yes, the patient reports the following respiratory symptoms: cough and shortness of breath.- Pt does have asthma so she has a chronic cough- this is a little worse.  She also has some congestion and is slightly SOB- slightly more than what she normally has.  Has used her inhalers and they have helped a little.    3.  Does patient have any comoribidities? None - has GERD so she coughs at times from that too.    4.  In the last 30 days, has the patient traveled outside of the country OR in a high risk area within the [place] OR have any known contact with someone who has or is suspected to have COVID-19?  No.    5. Disposition: Advised to perform self care, monitor for worsening symptoms and to call back in 3 days if no improvement  Also instructed her to send a [employer] message to Dr. Goddard, and that she or someone in the office should respond.    Note routed to PCP: [facility] only.

## 2020-04-17 DIAGNOSIS — J45.20 MILD INTERMITTENT REACTIVE AIRWAY DISEASE WITHOUT COMPLICATION: ICD-10-CM

## 2020-04-17 RX ORDER — ALBUTEROL SULFATE 90 UG/1
2 AEROSOL, METERED RESPIRATORY (INHALATION) EVERY 6 HOURS PRN
Qty: 8.5 G | Refills: 2 | Status: SHIPPED | OUTPATIENT
Start: 2020-04-17 | End: 2023-04-14 | Stop reason: SDUPTHER

## 2020-04-17 NOTE — TELEPHONE ENCOUNTER
Received request via: Patient    Was the patient seen in the last year in this department? Yes    Does the patient have an active prescription (recently filled or refills available) for medication(s) requested? No       Inhaler was prescribed by Urgent Care, and they will not fill it for her. Is wondering if you will? Thank You.

## 2020-06-23 ENCOUNTER — APPOINTMENT (RX ONLY)
Dept: URBAN - METROPOLITAN AREA CLINIC 20 | Facility: CLINIC | Age: 80
Setting detail: DERMATOLOGY
End: 2020-06-23

## 2020-06-23 DIAGNOSIS — L82.0 INFLAMED SEBORRHEIC KERATOSIS: ICD-10-CM

## 2020-06-23 PROBLEM — D48.5 NEOPLASM OF UNCERTAIN BEHAVIOR OF SKIN: Status: ACTIVE | Noted: 2020-06-23

## 2020-06-23 PROCEDURE — ? COUNSELING

## 2020-06-23 PROCEDURE — ? BIOPSY BY SHAVE METHOD

## 2020-06-23 PROCEDURE — ? LIQUID NITROGEN

## 2020-06-23 PROCEDURE — 17110 DESTRUCTION B9 LES UP TO 14: CPT

## 2020-06-23 PROCEDURE — 11102 TANGNTL BX SKIN SINGLE LES: CPT | Mod: 59

## 2020-06-23 ASSESSMENT — LOCATION DETAILED DESCRIPTION DERM
LOCATION DETAILED: RIGHT INFERIOR POSTAURICULAR SKIN
LOCATION DETAILED: RIGHT POSTERIOR NECK
LOCATION DETAILED: RIGHT SUPERIOR POSTERIOR NECK

## 2020-06-23 ASSESSMENT — LOCATION SIMPLE DESCRIPTION DERM
LOCATION SIMPLE: POSTERIOR NECK
LOCATION SIMPLE: POSTERIOR SCALP

## 2020-06-23 ASSESSMENT — LOCATION ZONE DERM
LOCATION ZONE: NECK
LOCATION ZONE: SCALP

## 2020-06-23 NOTE — PROCEDURE: BIOPSY BY SHAVE METHOD
Detail Level: Detailed
Depth Of Biopsy: dermis
Was A Bandage Applied: Yes
Size Of Lesion In Cm: 1.5
X Size Of Lesion In Cm: 0.9
Biopsy Type: H and E
Biopsy Method: Dermablade
Anesthesia Type: 1% lidocaine with epinephrine
Anesthesia Volume In Cc: 0.5
Additional Anesthesia Volume In Cc (Will Not Render If 0): 0
Hemostasis: Drysol
Wound Care: Petrolatum
Dressing: bandage
Destruction After The Procedure: No
Type Of Destruction Used: Curettage
Curettage Text: The wound bed was treated with curettage after the biopsy was performed.
Cryotherapy Text: The wound bed was treated with cryotherapy after the biopsy was performed.
Electrodesiccation Text: The wound bed was treated with electrodesiccation after the biopsy was performed.
Electrodesiccation And Curettage Text: The wound bed was treated with electrodesiccation and curettage after the biopsy was performed.
Silver Nitrate Text: The wound bed was treated with silver nitrate after the biopsy was performed.
Lab: [id number]
Lab Facility: 
Consent: Written consent was obtained and risks were reviewed including but not limited to scarring, infection, bleeding, scabbing, incomplete removal, nerve damage and allergy to anesthesia.
Post-Care Instructions: I reviewed with the patient in detail post-care instructions. Patient is to keep the biopsy site dry overnight, and then apply bacitracin twice daily until healed. Patient may apply hydrogen peroxide soaks to remove any crusting.
Notification Instructions: Patient will be notified of biopsy results. However, patient instructed to call the office if not contacted within 2 weeks.
Billing Type: Third-Party Bill
Information: Selecting Yes will display possible errors in your note based on the variables you have selected. This validation is only offered as a suggestion for you. PLEASE NOTE THAT THE VALIDATION TEXT WILL BE REMOVED WHEN YOU FINALIZE YOUR NOTE. IF YOU WANT TO FAX A PRELIMINARY NOTE YOU WILL NEED TO TOGGLE THIS TO 'NO' IF YOU DO NOT WANT IT IN YOUR FAXED NOTE.

## 2020-12-01 ENCOUNTER — TELEMEDICINE (OUTPATIENT)
Dept: MEDICAL GROUP | Facility: MEDICAL CENTER | Age: 80
End: 2020-12-01
Payer: MEDICARE

## 2020-12-01 VITALS — BODY MASS INDEX: 21.17 KG/M2 | TEMPERATURE: 98.6 F | HEIGHT: 64 IN | WEIGHT: 124 LBS

## 2020-12-01 DIAGNOSIS — I10 ESSENTIAL HYPERTENSION: ICD-10-CM

## 2020-12-01 DIAGNOSIS — F32.9 REACTIVE DEPRESSION: ICD-10-CM
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
## 2020-12-01 PROCEDURE — 99214 OFFICE O/P EST MOD 30 MIN: CPT | Mod: 95,CR | Performed by: FAMILY MEDICINE

## 2020-12-01 RX ORDER — TRIAMTERENE AND HYDROCHLOROTHIAZIDE 37.5; 25 MG/1; MG/1
CAPSULE ORAL
Qty: 30 CAP | Refills: 0 | Status: SHIPPED | OUTPATIENT
Start: 2020-12-01 | End: 2022-04-22

## 2020-12-01 RX ORDER — DIAZEPAM 5 MG/1
TABLET ORAL
COMMUNITY
Start: 2020-11-17

## 2020-12-01 NOTE — ASSESSMENT & PLAN NOTE
Sees a therapist. Struggling with current pandemic and being isolated.   Has 3 sons, sees them as often as possible.   Doing crossword puzzles, reading, sudoku.   Using the calm cynthia.   Having a hard time getting out with it being so cold.   Plans to ask her therapist about an SSRI, has her next appt next week.

## 2020-12-01 NOTE — ASSESSMENT & PLAN NOTE
New problem. Cat bit her about 1 month ago. Still  Has hard, rasied, erythematous, ulcerated top. Denies any fever, chills, body aches etc.

## 2020-12-01 NOTE — PROGRESS NOTES
"Virtual Visit: Established Patient   This visit was conducted via Zoom using secure and encrypted videoconferencing technology. The patient was in a private location in the state of [place].    The patient's identity was confirmed and verbal consent was obtained for this virtual visit.    Subjective:   CC:   Chief Complaint   Patient presents with   • Other     COVID       Mariama Dina Gonzales is a 80 y.o. female presenting for evaluation and management of:    Cat bite  New problem. Cat bit her about 1 month ago. Still  Has hard, rasied, erythematous, ulcerated top. Denies any fever, chills, body aches etc. continues to be quite painful.  Unclear if there is fluid inside.    Reactive depression  New problem.  The patient reports that she has been experiencing rather significant depression over the past couple months.  Also struggling with anxiety.  Sees a therapist once per month. Struggling with current pandemic and being isolated.  She is feeling extremely lonely.  Has 3 sons, sees them as often as possible.  Has not seen any of them for the past month.  Doing crossword puzzles, reading, sudoku.   Using the calm cynthia.   Having a hard time getting out with it being so cold.   Plans to ask her therapist about an SSRI, has her next appt next week.  Using Xanax as needed.    ROS   Denies any recent fevers or chills. No nausea or vomiting. No chest pains or shortness of breath.     Allergies   Allergen Reactions   • Ciprofloxacin    • Other Drug      \"tumor necrosis factor drugs\" infections   • Sulfa Drugs Hives       Current medicines (including changes today)  Current Outpatient Medications   Medication Sig Dispense Refill   • diazePAM (VALIUM) 5 MG Tab      • albuterol 108 (90 Base) MCG/ACT Aero Soln inhalation aerosol Inhale 2 Puffs by mouth every 6 hours as needed for Shortness of Breath. 8.5 g 2   • triamterene/hctz (MAXZIDE-25/DYAZIDE) 37.5-25 MG Cap Take 1 Cap by mouth every morning. 30 Cap 0   • azithromycin " (ZITHROMAX) 250 MG Tab Take 2 tab po x 1 day, followed by 1 tab po x 4 days 6 Tab 0   • vitamin D, Ergocalciferol, (DRISDOL) 13327 units Cap capsule Take  by mouth every 7 days.     • raNITidine (ZANTAC) 150 MG Tab Take 150 mg by mouth 2 times a day.     • topiramate (TOPAMAX) 100 MG Tab Take 1 Tab by mouth 2 times a day. 180 Tab 2   • lidocaine (XYLOCAINE) 5 % Ointment      • triamcinolone acetonide (KENALOG) 0.1 % Ointment      • ALPRAZolam (XANAX) 0.5 MG Tab Take 0.5 mg by mouth at bedtime as needed for Sleep.     • Fluvoxamine Maleate 100 MG CAPSULE SR 24 HR Take 100 mg by mouth every bedtime.     • NON SPECIFIED Shingrex vaccine, IM deltoid 1 Each 0   • coenzyme Q-10 30 MG capsule Take 60 mg by mouth every day.     • Turmeric, Curcuma Longa, (CURCUMIN) Powder by Does not apply route.     • GLUCOSAMINE-CHONDROITIN PO Take  by mouth.     • FOLIC ACID PO Take  by mouth every day.     • Hyoscyamine Sulfate (LEVSIN PO) Take  by mouth as needed.     • BIOTIN 5000 PO Take  by mouth every day.     • Multiple Vitamin (MULTIVITAMIN PO) Take  by mouth every day.     • naproxen (NAPROSYN) 375 MG TABS Take 375 mg by mouth 2 times a day, with meals.       No current facility-administered medications for this visit.        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 08/03/2017   • Gtz's esophagus without dysplasia 06/29/2017   • Reactive airway disease without  complication 06/29/2017   • Migraine without aura    • Hyponatremia 01/28/2014   • Lupus (systemic lupus erythematosus) (HCC) 07/14/2011   • Sjogren's syndrome (HCC) 07/14/2011   • Aortic valve disease 07/14/2011   • Lichen planus 07/14/2011       Family History   Problem Relation Age of Onset   • Arthritis Mother    • Cancer Father    • Heart Disease Father    • Hypertension Father    • Diabetes Maternal Aunt    • Cancer Maternal Uncle    • Heart Disease Maternal Grandmother    • Diabetes Maternal Grandfather    • Genetic Disorder Paternal Grandmother    • Heart Disease Paternal Grandfather    • Hyperlipidemia Paternal Grandfather    • Diabetes Maternal Aunt    • Genetic Disorder Maternal Uncle    • Cancer Paternal Aunt    • Cancer Paternal Aunt    • Cancer Paternal Uncle        She  has a past medical history of Allergy, unspecified not elsewhere classified, Anxiety, Arthritis, Blood transfusion, without reported diagnosis, CATARACT, Dental disorder, Depression, Fibromyalgia, GERD (gastroesophageal reflux disease), GERD (gastroesophageal reflux disease), Headache(784.0), Headache, classical migraine, Kidney calculi, Lichen planus, Lupus (HCC), Migraine without aura, without mention of intractable migraine without mention of status migrainosus, Mitral valve prolapse, Osteoporosis, unspecified, Other convulsions, Pain, Psychiatric problem, Shingles (10/4/11), Sjogren's syndrome (HCC), Snoring, Unspecified asthma(493.90), and Urinary tract infection, site not specified.  She  has a past surgical history that includes mastectomy (1972); mammoplasty augmentation (1988); other (2007); breast biopsy (1970's); other (1988); shoulder decompression arthroscopic (10/6/2011); clavicle distal excision (10/6/2011); closed reduction (8/6/2012); hip cannulated screw (8/6/2012); hip cannulated screw (1/28/2014); abdominal exploration; eye surgery; and open reduction.       Objective:   Temp 37 °C (98.6 °F) (Temporal)   Ht 1.626 m  "(5' 4\")   Wt 56.2 kg (124 lb)   BMI 21.28 kg/m²     Physical Exam:  General: Normal appearing. No distress.  HEAD: NCAT  EYES: conjunctiva clear, lids without ptosis, pupils equal and reactive to light  EARS: ears normal shape and contour.  MOUTH: normal dentition   Neck:  Normal ROM  Pulmonary: Normal effort. Normal respiratory rate.  Cardiovascular: Well perfused. No LE edema  Neurologic: Grossly normal, no focal deficits  Skin: Warm and dry.  Lesion on left anterior forearm hard, rasied, erythematous, ulcerated top.  Tender to palpation.  Musculoskeletal: Normal gait and station.   Psych: Normal mood and affect. Alert and oriented x3. Judgment and insight is normal.      Assessment and Plan:   The following treatment plan was discussed:     1. Cat bite, initial encounter  New problem.  Exam concerning for infection and possible abscess.  Plan to have the patient come into clinic on Thursday for further evaluation.  Denies any systemic symptoms.  Is been present for the past month.    2. Reactive depression  New problem.  Patient's been struggling with depression and anxiety due to feeling isolated and lonely because of the pandemic.  Seeing a therapist once per month who prescribes medication.  Using Xanax as needed.  Advised to please discuss starting an SSRI with her therapist which she plans to do, appointment next week.      Follow-up: Return in about 2 days (around 12/3/2020).         "

## 2020-12-03 ENCOUNTER — OFFICE VISIT (OUTPATIENT)
Dept: MEDICAL GROUP | Facility: MEDICAL CENTER | Age: 80
End: 2020-12-03
Payer: MEDICARE

## 2020-12-03 VITALS
SYSTOLIC BLOOD PRESSURE: 122 MMHG | OXYGEN SATURATION: 97 % | HEIGHT: 64 IN | DIASTOLIC BLOOD PRESSURE: 74 MMHG | TEMPERATURE: 97 F | WEIGHT: 123.6 LBS | HEART RATE: 74 BPM | BODY MASS INDEX: 21.1 KG/M2
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
## 2020-12-03 DIAGNOSIS — L98.9 SKIN LESION: ICD-10-CM

## 2020-12-03 PROCEDURE — 99214 OFFICE O/P EST MOD 30 MIN: CPT | Performed by: FAMILY MEDICINE

## 2020-12-03 RX ORDER — AMOXICILLIN AND CLAVULANATE POTASSIUM 875; 125 MG/1; MG/1
1 TABLET, FILM COATED ORAL 2 TIMES DAILY
Qty: 14 TAB | Refills: 0 | Status: SHIPPED
Start: 2020-12-03 | End: 2021-06-10

## 2020-12-03 NOTE — ASSESSMENT & PLAN NOTE
New problem.   1 month ago  Now with   No drainage  Started raising up within a couple days of the bit.     sk on upper right back.

## 2020-12-04 NOTE — PROGRESS NOTES
Subjective:     CC: Diagnoses of Cat bite, initial encounter and Skin lesion were pertinent to this visit.    HPI: Patient is a 80 y.o. female established patient who presents today with concern regarding cat bite.      Cat bite  New problem.   GERD 1 month ago  Now with a raised skin lesion, tender to palpation.  No drainage  Patient states the area started raising up within a couple days of the bite.   Does not remember a skin lesion there prior to the cat bite.  Denies any fevers or chills.  Has no swelling in the armpit.      Past Medical History:   Diagnosis Date   • Allergy, unspecified not elsewhere classified    • Anxiety    • Arthritis    • Blood transfusion, without reported diagnosis    • CATARACT    • Dental disorder     lichen planus   • Depression    • Fibromyalgia    • GERD (gastroesophageal reflux disease)    • GERD (gastroesophageal reflux disease)    • Headache(784.0)    • Headache, classical migraine    • Kidney calculi    • Lichen planus    • Lupus (HCC)    • Migraine without aura, without mention of intractable migraine without mention of status migrainosus    • Mitral valve prolapse    • Osteoporosis, unspecified    • Other convulsions     Isolated seizure    • Pain     joints, muscles   • Psychiatric problem     depression   • Shingles 10/4/11    dr velasco aware of this back of head   • Sjogren's syndrome (HCC)    • Snoring    • Unspecified asthma(493.90)    • Urinary tract infection, site not specified        Social History     Tobacco Use   • Smoking status: Former Smoker     Years: 34.00     Quit date: 1978     Years since quittin.8   • Smokeless tobacco: Former User     Quit date: 1978   Substance Use Topics   • Alcohol use: Yes     Comment: Rarely   • Drug use: No       Current Outpatient Medications Ordered in Epic   Medication Sig Dispense Refill   • amoxicillin-clavulanate (AUGMENTIN) 875-125 MG Tab Take 1 Tab by mouth 2 times a day. 14 Tab 0   • diazePAM  (VALIUM) 5 MG Tab      • triamterene/hctz (MAXZIDE-25/DYAZIDE) 37.5-25 MG Cap TAKE ONE CAPSULE BY MOUTH EVERY MORNING 30 Cap 0   • albuterol 108 (90 Base) MCG/ACT Aero Soln inhalation aerosol Inhale 2 Puffs by mouth every 6 hours as needed for Shortness of Breath. 8.5 g 2   • azithromycin (ZITHROMAX) 250 MG Tab Take 2 tab po x 1 day, followed by 1 tab po x 4 days 6 Tab 0   • vitamin D, Ergocalciferol, (DRISDOL) 34312 units Cap capsule Take  by mouth every 7 days.     • raNITidine (ZANTAC) 150 MG Tab Take 150 mg by mouth 2 times a day.     • topiramate (TOPAMAX) 100 MG Tab Take 1 Tab by mouth 2 times a day. 180 Tab 2   • lidocaine (XYLOCAINE) 5 % Ointment      • triamcinolone acetonide (KENALOG) 0.1 % Ointment      • ALPRAZolam (XANAX) 0.5 MG Tab Take 0.5 mg by mouth at bedtime as needed for Sleep.     • Fluvoxamine Maleate 100 MG CAPSULE SR 24 HR Take 100 mg by mouth every bedtime.     • NON SPECIFIED Shingrex vaccine, IM deltoid 1 Each 0   • coenzyme Q-10 30 MG capsule Take 60 mg by mouth every day.     • Turmeric, Curcuma Longa, (CURCUMIN) Powder by Does not apply route.     • GLUCOSAMINE-CHONDROITIN PO Take  by mouth.     • FOLIC ACID PO Take  by mouth every day.     • Hyoscyamine Sulfate (LEVSIN PO) Take  by mouth as needed.     • BIOTIN 5000 PO Take  by mouth every day.     • Multiple Vitamin (MULTIVITAMIN PO) Take  by mouth every day.     • naproxen (NAPROSYN) 375 MG TABS Take 375 mg by mouth 2 times a day, with meals.       No current Epic-ordered facility-administered medications on file.        Allergies:  Ciprofloxacin, Other drug, and Sulfa drugs    Health Maintenance: Completed    ROS:  Gen: no fevers/chill, no changes in weight  Eyes: no changes in vision  ENT: no sore throat, no hearing loss, no bloody nose  Pulm: no sob, no cough  CV: no chest pain, no palpitations  GI: no nausea/vomiting, no diarrhea  : no dysuria  MSk: no myalgias  Skin: no rash  Neuro: no headaches, no  "numbness/tingling  Heme/Lymph: no easy bruising      Objective:       Exam:  /74 (BP Location: Right arm, Patient Position: Sitting, BP Cuff Size: Adult long)   Pulse 74   Temp 36.1 °C (97 °F) (Temporal)   Ht 1.626 m (5' 4\")   Wt 56.1 kg (123 lb 9.6 oz)   SpO2 97%   BMI 21.22 kg/m²  Body mass index is 21.22 kg/m².    General: Normal appearing. No distress.  HEAD: NCAT  EYES: conjunctiva clear, lids without ptosis, pupils equal and reactive to light  EARS: ears normal shape and contour.  MOUTH: normal dentition   Neck:  Normal ROM  Pulmonary: Normal effort. Normal respiratory rate.  Cardiovascular: Well perfused. No LE edema  Neurologic: Grossly normal, no focal deficits  Skin: Warm and dry.  Approximately 1 cm circular, raised with rounded edges and necrotic top skin lesion on the left anterior forearm.  Musculoskeletal: Normal gait and station.   Psych: Normal mood and affect. Alert and oriented x3. Judgment and insight is normal.    Labs: 12/17/2019 results reviewed and discussed with the patient, questions answered.    Assessment & Plan:     80 y.o. female with the following -     1. Cat bite, initial encounter  New problem.  Skin lesion on left arm the patient states started when she got a bite from her cat.  It is tender to palpation.  Proximately 1 cm, raised approximately 1.5 cm, rounded edges, circular with necrotic top.  Consistent with basal cell carcinoma although given cat bite plan to treat with antibiotics.  Referral placed to dermatology for further input.  - REFERRAL TO DERMATOLOGY  - amoxicillin-clavulanate (AUGMENTIN) 875-125 MG Tab; Take 1 Tab by mouth 2 times a day.  Dispense: 14 Tab; Refill: 0    2. Skin lesion    - REFERRAL TO DERMATOLOGY  - amoxicillin-clavulanate (AUGMENTIN) 875-125 MG Tab; Take 1 Tab by mouth 2 times a day.  Dispense: 14 Tab; Refill: 0      No follow-ups on file.    Please note that this dictation was created using voice recognition software. I have made every " reasonable attempt to correct obvious errors, but I expect that there are errors of grammar and possibly content that I did not discover before finalizing the note.

## 2020-12-22 ENCOUNTER — APPOINTMENT (RX ONLY)
Dept: URBAN - METROPOLITAN AREA CLINIC 20 | Facility: CLINIC | Age: 80
Setting detail: DERMATOLOGY
End: 2020-12-22

## 2020-12-22 DIAGNOSIS — L82.0 INFLAMED SEBORRHEIC KERATOSIS: ICD-10-CM

## 2020-12-22 DIAGNOSIS — L57.0 ACTINIC KERATOSIS: ICD-10-CM

## 2020-12-22 DIAGNOSIS — L81.4 OTHER MELANIN HYPERPIGMENTATION: ICD-10-CM

## 2020-12-22 DIAGNOSIS — L85.3 XEROSIS CUTIS: ICD-10-CM

## 2020-12-22 DIAGNOSIS — D22 MELANOCYTIC NEVI: ICD-10-CM

## 2020-12-22 DIAGNOSIS — Z85.828 PERSONAL HISTORY OF OTHER MALIGNANT NEOPLASM OF SKIN: ICD-10-CM

## 2020-12-22 DIAGNOSIS — D18.0 HEMANGIOMA: ICD-10-CM

## 2020-12-22 DIAGNOSIS — L82.1 OTHER SEBORRHEIC KERATOSIS: ICD-10-CM

## 2020-12-22 DIAGNOSIS — Z71.89 OTHER SPECIFIED COUNSELING: ICD-10-CM

## 2020-12-22 PROBLEM — D22.5 MELANOCYTIC NEVI OF TRUNK: Status: ACTIVE | Noted: 2020-12-22

## 2020-12-22 PROBLEM — D22.61 MELANOCYTIC NEVI OF RIGHT UPPER LIMB, INCLUDING SHOULDER: Status: ACTIVE | Noted: 2020-12-22

## 2020-12-22 PROBLEM — D22.62 MELANOCYTIC NEVI OF LEFT UPPER LIMB, INCLUDING SHOULDER: Status: ACTIVE | Noted: 2020-12-22

## 2020-12-22 PROBLEM — D48.5 NEOPLASM OF UNCERTAIN BEHAVIOR OF SKIN: Status: ACTIVE | Noted: 2020-12-22

## 2020-12-22 PROBLEM — D22.71 MELANOCYTIC NEVI OF RIGHT LOWER LIMB, INCLUDING HIP: Status: ACTIVE | Noted: 2020-12-22

## 2020-12-22 PROBLEM — D18.01 HEMANGIOMA OF SKIN AND SUBCUTANEOUS TISSUE: Status: ACTIVE | Noted: 2020-12-22

## 2020-12-22 PROBLEM — D22.72 MELANOCYTIC NEVI OF LEFT LOWER LIMB, INCLUDING HIP: Status: ACTIVE | Noted: 2020-12-22

## 2020-12-22 PROCEDURE — 17003 DESTRUCT PREMALG LES 2-14: CPT | Mod: 59

## 2020-12-22 PROCEDURE — ? LIQUID NITROGEN

## 2020-12-22 PROCEDURE — 11102 TANGNTL BX SKIN SINGLE LES: CPT | Mod: 59

## 2020-12-22 PROCEDURE — ? COUNSELING

## 2020-12-22 PROCEDURE — 99214 OFFICE O/P EST MOD 30 MIN: CPT | Mod: 25

## 2020-12-22 PROCEDURE — 17110 DESTRUCTION B9 LES UP TO 14: CPT

## 2020-12-22 PROCEDURE — ? BIOPSY BY SHAVE METHOD

## 2020-12-22 PROCEDURE — 17000 DESTRUCT PREMALG LESION: CPT | Mod: 59

## 2020-12-22 ASSESSMENT — LOCATION DETAILED DESCRIPTION DERM
LOCATION DETAILED: EPIGASTRIC SKIN
LOCATION DETAILED: RIGHT PROXIMAL POSTERIOR THIGH
LOCATION DETAILED: RIGHT DISTAL PRETIBIAL REGION
LOCATION DETAILED: LEFT PROXIMAL DORSAL FOREARM
LOCATION DETAILED: RIGHT MEDIAL FRONTAL SCALP
LOCATION DETAILED: RIGHT ULNAR DORSAL HAND
LOCATION DETAILED: RIGHT INFERIOR MEDIAL UPPER BACK
LOCATION DETAILED: LEFT MID-UPPER BACK
LOCATION DETAILED: LEFT PROXIMAL PRETIBIAL REGION
LOCATION DETAILED: LEFT VENTRAL DISTAL FOREARM
LOCATION DETAILED: RIGHT PROXIMAL DORSAL FOREARM
LOCATION DETAILED: LEFT ANTERIOR DISTAL THIGH
LOCATION DETAILED: RIGHT SUPERIOR LATERAL MIDBACK
LOCATION DETAILED: LEFT ANTERIOR DISTAL UPPER ARM
LOCATION DETAILED: NASAL ROOT
LOCATION DETAILED: LEFT DISTAL PRETIBIAL REGION
LOCATION DETAILED: INFERIOR THORACIC SPINE
LOCATION DETAILED: RIGHT VENTRAL DISTAL FOREARM
LOCATION DETAILED: LEFT MEDIAL SUPERIOR CHEST
LOCATION DETAILED: RIGHT SUPERIOR PARIETAL SCALP
LOCATION DETAILED: RIGHT RADIAL DORSAL HAND
LOCATION DETAILED: RIGHT DISTAL DORSAL FOREARM
LOCATION DETAILED: RIGHT PROXIMAL PRETIBIAL REGION
LOCATION DETAILED: RIGHT ANTERIOR DISTAL THIGH
LOCATION DETAILED: RIGHT DISTAL POSTERIOR THIGH
LOCATION DETAILED: LEFT VENTRAL PROXIMAL FOREARM
LOCATION DETAILED: LEFT PROXIMAL POSTERIOR THIGH
LOCATION DETAILED: LEFT INFERIOR CENTRAL MALAR CHEEK
LOCATION DETAILED: LEFT DISTAL POSTERIOR THIGH
LOCATION DETAILED: RIGHT ANTERIOR DISTAL UPPER ARM
LOCATION DETAILED: RIGHT VENTRAL PROXIMAL FOREARM
LOCATION DETAILED: LEFT SUPERIOR MEDIAL MIDBACK

## 2020-12-22 ASSESSMENT — LOCATION SIMPLE DESCRIPTION DERM
LOCATION SIMPLE: SCALP
LOCATION SIMPLE: RIGHT LOWER BACK
LOCATION SIMPLE: UPPER BACK
LOCATION SIMPLE: LEFT POSTERIOR THIGH
LOCATION SIMPLE: RIGHT POSTERIOR THIGH
LOCATION SIMPLE: LEFT CHEEK
LOCATION SIMPLE: LEFT THIGH
LOCATION SIMPLE: ABDOMEN
LOCATION SIMPLE: LEFT FOREARM
LOCATION SIMPLE: LEFT PRETIBIAL REGION
LOCATION SIMPLE: RIGHT THIGH
LOCATION SIMPLE: RIGHT SCALP
LOCATION SIMPLE: RIGHT PRETIBIAL REGION
LOCATION SIMPLE: RIGHT UPPER ARM
LOCATION SIMPLE: LEFT LOWER BACK
LOCATION SIMPLE: RIGHT FOREARM
LOCATION SIMPLE: RIGHT UPPER BACK
LOCATION SIMPLE: LEFT UPPER BACK
LOCATION SIMPLE: LEFT UPPER ARM
LOCATION SIMPLE: NOSE
LOCATION SIMPLE: RIGHT HAND
LOCATION SIMPLE: CHEST

## 2020-12-22 ASSESSMENT — LOCATION ZONE DERM
LOCATION ZONE: LEG
LOCATION ZONE: ARM
LOCATION ZONE: SCALP
LOCATION ZONE: FACE
LOCATION ZONE: NOSE
LOCATION ZONE: TRUNK
LOCATION ZONE: TRUNK
LOCATION ZONE: HAND

## 2020-12-22 NOTE — PROCEDURE: BIOPSY BY SHAVE METHOD

## 2021-01-11 DIAGNOSIS — Z23 NEED FOR VACCINATION: ICD-10-CM

## 2021-01-14 ENCOUNTER — IMMUNIZATION (OUTPATIENT)
Dept: FAMILY PLANNING/WOMEN'S HEALTH CLINIC | Facility: IMMUNIZATION CENTER | Age: 81
End: 2021-01-14
Attending: INTERNAL MEDICINE
Payer: MEDICARE

## 2021-01-14 DIAGNOSIS — Z23 NEED FOR VACCINATION: ICD-10-CM

## 2021-01-14 DIAGNOSIS — Z23 ENCOUNTER FOR VACCINATION: Primary | ICD-10-CM

## 2021-01-14 PROCEDURE — 0001A PFIZER SARS-COV-2 VACCINE: CPT

## 2021-01-14 PROCEDURE — 91300 PFIZER SARS-COV-2 VACCINE: CPT

## 2021-01-18 ENCOUNTER — APPOINTMENT (RX ONLY)
Dept: URBAN - METROPOLITAN AREA CLINIC 36 | Facility: CLINIC | Age: 81
Setting detail: DERMATOLOGY
End: 2021-01-18

## 2021-01-18 PROBLEM — C44.629 SQUAMOUS CELL CARCINOMA OF SKIN OF LEFT UPPER LIMB, INCLUDING SHOULDER: Status: ACTIVE | Noted: 2021-01-18

## 2021-01-18 PROCEDURE — 17314 MOHS ADDL STAGE T/A/L: CPT

## 2021-01-18 PROCEDURE — 12032 INTMD RPR S/A/T/EXT 2.6-7.5: CPT

## 2021-01-18 PROCEDURE — 17313 MOHS 1 STAGE T/A/L: CPT

## 2021-01-18 PROCEDURE — ? MOHS SURGERY

## 2021-01-18 NOTE — PROCEDURE: MOHS SURGERY
Mohs Case Number: [id number]
Previous Accession (Optional): [id number]
Biopsy Photograph Reviewed: Yes
Referring Physician (Optional): beth
Consent Type: Consent 1 (Standard)
Eye Shield Used: No
Surgeon Performing Repair (Optional): Meño
Initial Size Of Lesion: 1.8
X Size Of Lesion In Cm (Optional): 2.1
Number Of Stages: 2
Primary Defect Width In Cm (Final Defect Size - Required For Flaps/Grafts): 1.6
Repair Type: Purse String Closure (Intermediate)
Oculoplastic Surgeon (A): Kurt
Oculoplastic Surgeon Procedure Text (A): After obtaining clear surgical margins the patient was sent to oculoplastics for surgical repair.  The patient understands they will receive post-surgical care and follow-up from the referring physician's office.
Otolaryngologist Procedure Text (A): After obtaining clear surgical margins the patient was sent to otolaryngology for surgical repair.  The patient understands they will receive post-surgical care and follow-up from the referring physician's office.
Plastic Surgeon Procedure Text (A): After obtaining clear surgical margins the patient was sent to plastics for surgical repair.  The patient understands they will receive post-surgical care and follow-up from the referring physician's office.
Mid-Level Procedure Text (A): After obtaining clear surgical margins the patient was sent to a mid-level provider for surgical repair.  The patient understands they will receive post-surgical care and follow-up from the mid-level provider.
Provider Procedure Text (A): After obtaining clear surgical margins the defect was repaired by another provider.
Asc Procedure Text (A): After obtaining clear surgical margins the patient was sent to an ASC for surgical repair.  The patient understands they will receive post-surgical care and follow-up from the ASC physician.
Suturegard Retention Suture: 2-0 Nylon
Retention Suture Bite Size: 3 mm
Length To Time In Minutes Device Was In Place: 10
Number Of Hemigard Strips Per Side: 1
Simple / Intermediate / Complex Repair - Final Wound Length In Cm: 3.2
Undermining Type: Entire Wound
Debridement Text: The wound edges were debrided prior to proceeding with the closure to facilitate wound healing.
Helical Rim Text: The closure involved the helical rim.
Vermilion Border Text: The closure involved the vermilion border.
Nostril Rim Text: The closure involved the nostril rim.
Retention Suture Text: Retention sutures were placed to support the closure and prevent dehiscence.
Secondary Defect Length In Cm (Required For Flaps): 0
Area H Indication Text: Tumors in this location are included in Area H (eyelids, eyebrows, nose, lips, chin, ear, pre-auricular, post-auricular, temple, genitalia, hands, feet, ankles and areola).  Tissue conservation is critical in these anatomic locations.
Area M Indication Text: Tumors in this location are included in Area M (cheek, forehead, scalp, neck, jawline and pretibial skin).  Mohs surgery is indicated for tumors in these anatomic locations.
Area L Indication Text: Tumors in this location are included in Area L (trunk and extremities).  Mohs surgery is indicated for larger tumors, or tumors with aggressive histologic features, in these anatomic locations.
Surgical Defect Length In Cm (Optional): 2.0
Surgical Defect Width In Cm (Optional): 1.5
Special Stains Stage 1 - Results: Base On Clearance Noted Above
Stage 2: Additional Anesthesia Type: 1% lidocaine with 1:100,000 epinephrine and 408mcg clindamycin/ml and a 1:10 solution of 8.4% sodium bicarbonate
Stage 4: Additional Anesthesia Type: 1% lidocaine with epinephrine
Include Tumor Staging In Mohs Note?: Please Select the Appropriate Response
Staging Info: By selecting yes to the question above you will include information on AJCC 8 tumor staging in your Mohs note. Information on tumor staging will be automatically added for SCCs on the head and neck. AJCC 8 includes tumor size, tumor depth, perineural involvement and bone invasion.
Tumor Depth: Less than 6mm from granular layer and no invasion beyond the subcutaneous fat
Medical Necessity Statement: Based on my medical judgement, Mohs surgery is the most appropriate treatment for this cancer compared to other treatments.
Alternatives Discussed Intro (Do Not Add Period): I discussed alternative treatments to Mohs surgery and specifically discussed the risks and benefits of
Consent 1/Introductory Paragraph: The rationale for Mohs was explained to the patient and consent was obtained. The risks, benefits and alternatives to therapy were discussed in detail. Specifically, the risks of infection, scarring, bleeding, prolonged wound healing, incomplete removal, allergy to anesthesia, nerve injury and recurrence were addressed. Prior to the procedure, the treatment site was clearly identified and confirmed by the patient. All components of Universal Protocol/PAUSE Rule completed.
Consent 2/Introductory Paragraph: Mohs surgery was explained to the patient and consent was obtained. The risks, benefits and alternatives to therapy were discussed in detail. Specifically, the risks of infection, scarring, bleeding, prolonged wound healing, incomplete removal, allergy to anesthesia, nerve injury and recurrence were addressed. Prior to the procedure, the treatment site was clearly identified and confirmed by the patient. All components of Universal Protocol/PAUSE Rule completed.
Consent 3/Introductory Paragraph: I gave the patient a chance to ask questions they had about the procedure.  Following this I explained the Mohs procedure and consent was obtained. The risks, benefits and alternatives to therapy were discussed in detail. Specifically, the risks of infection, scarring, bleeding, prolonged wound healing, incomplete removal, allergy to anesthesia, nerve injury and recurrence were addressed. Prior to the procedure, the treatment site was clearly identified and confirmed by the patient. All components of Universal Protocol/PAUSE Rule completed.
Consent (Temporal Branch)/Introductory Paragraph: The rationale for Mohs was explained to the patient and consent was obtained. The risks, benefits and alternatives to therapy were discussed in detail. Specifically, the risks of damage to the temporal branch of the facial nerve, infection, scarring, bleeding, prolonged wound healing, incomplete removal, allergy to anesthesia, and recurrence were addressed. Prior to the procedure, the treatment site was clearly identified and confirmed by the patient. All components of Universal Protocol/PAUSE Rule completed.
Consent (Marginal Mandibular)/Introductory Paragraph: The rationale for Mohs was explained to the patient and consent was obtained. The risks, benefits and alternatives to therapy were discussed in detail. Specifically, the risks of damage to the marginal mandibular branch of the facial nerve, infection, scarring, bleeding, prolonged wound healing, incomplete removal, allergy to anesthesia, and recurrence were addressed. Prior to the procedure, the treatment site was clearly identified and confirmed by the patient. All components of Universal Protocol/PAUSE Rule completed.
Consent (Spinal Accessory)/Introductory Paragraph: The rationale for Mohs was explained to the patient and consent was obtained. The risks, benefits and alternatives to therapy were discussed in detail. Specifically, the risks of damage to the spinal accessory nerve, infection, scarring, bleeding, prolonged wound healing, incomplete removal, allergy to anesthesia, and recurrence were addressed. Prior to the procedure, the treatment site was clearly identified and confirmed by the patient. All components of Universal Protocol/PAUSE Rule completed.
Consent (Near Eyelid Margin)/Introductory Paragraph: The rationale for Mohs was explained to the patient and consent was obtained. The risks, benefits and alternatives to therapy were discussed in detail. Specifically, the risks of ectropion or eyelid deformity, infection, scarring, bleeding, prolonged wound healing, incomplete removal, allergy to anesthesia, nerve injury and recurrence were addressed. Prior to the procedure, the treatment site was clearly identified and confirmed by the patient. All components of Universal Protocol/PAUSE Rule completed.
Consent (Ear)/Introductory Paragraph: The rationale for Mohs was explained to the patient and consent was obtained. The risks, benefits and alternatives to therapy were discussed in detail. Specifically, the risks of ear deformity, infection, scarring, bleeding, prolonged wound healing, incomplete removal, allergy to anesthesia, nerve injury and recurrence were addressed. Prior to the procedure, the treatment site was clearly identified and confirmed by the patient. All components of Universal Protocol/PAUSE Rule completed.
Consent (Nose)/Introductory Paragraph: The rationale for Mohs was explained to the patient and consent was obtained. The risks, benefits and alternatives to therapy were discussed in detail. Specifically, the risks of nasal deformity, changes in the flow of air through the nose, infection, scarring, bleeding, prolonged wound healing, incomplete removal, allergy to anesthesia, nerve injury and recurrence were addressed. Prior to the procedure, the treatment site was clearly identified and confirmed by the patient. All components of Universal Protocol/PAUSE Rule completed.
Consent (Lip)/Introductory Paragraph: The rationale for Mohs was explained to the patient and consent was obtained. The risks, benefits and alternatives to therapy were discussed in detail. Specifically, the risks of lip deformity, changes in the oral aperture, infection, scarring, bleeding, prolonged wound healing, incomplete removal, allergy to anesthesia, nerve injury and recurrence were addressed. Prior to the procedure, the treatment site was clearly identified and confirmed by the patient. All components of Universal Protocol/PAUSE Rule completed.
Consent (Scalp)/Introductory Paragraph: The rationale for Mohs was explained to the patient and consent was obtained. The risks, benefits and alternatives to therapy were discussed in detail. Specifically, the risks of changes in hair growth pattern secondary to repair, infection, scarring, bleeding, prolonged wound healing, incomplete removal, allergy to anesthesia, nerve injury and recurrence were addressed. Prior to the procedure, the treatment site was clearly identified and confirmed by the patient. All components of Universal Protocol/PAUSE Rule completed.
Detail Level: Detailed
Postop Diagnosis: same
Anesthesia Type: 0.5% lidocaine with 1:200,000 epinephrine and a 1:10 solution of 8.4% sodium bicarbonate and 408mcg clindamycin/ml
Anesthesia Volume In Cc: 6
Hemostasis: Electrocautery
Estimated Blood Loss (Cc): less than 5 cc
Repair Anesthesia Method: local infiltration
Brow Lift Text: A midfrontal incision was made medially to the defect to allow access to the tissues just superior to the left eyebrow. Following careful dissection inferiorly in a supraperiosteal plane to the level of the left eyebrow, several 3-0 monocryl sutures were used to resuspend the eyebrow orbicularis oculi muscular unit to the superior frontal bone periosteum. This resulted in an appropriate reapproximation of static eyebrow symmetry and correction of the left brow ptosis.
Deep Sutures: 4-0 Maxon
Epidermal Closure: running subcuticular
Suturegard Intro: Intraoperative tissue expansion was performed, utilizing the SUTUREGARD device, in order to reduce wound tension.
Suturegard Body: The suture ends were repeatedly re-tightened and re-clamped to achieve the desired tissue expansion.
Hemigard Intro: Due to skin fragility and wound tension, it was decided to use HEMIGARD adhesive retention suture devices to permit a linear closure. The skin was cleaned and dried for a 6cm distance away from the wound. Excessive hair, if present, was removed to allow for adhesion.
Hemigard Postcare Instructions: The HEMIGARD strips are to remain completely dry for at least 5-7 days.
Donor Site Anesthesia Type: same as repair anesthesia
Graft Basting Suture (Optional): 5-0 Fast Absorbing Gut
Graft Donor Site Epidermal Sutures (Optional): 5-0 Ethibond
Epidermal Closure Graft Donor Site (Optional): simple interrupted
Graft Donor Site Bandage (Optional-Leave Blank If You Don't Want In Note): Aquaphor and telefa placed on wound. Pressure dressing applied to donor site
Closure 2 Information: This tab is for additional flaps and grafts, including complex repair and grafts and complex repair and flaps. You can also specify a different location for the additional defect, if the location is the same you do not need to select a new one. We will insert the automated text for the repair you select below just as we do for solitary flaps and grafts. Please note that at this time if you select a location with a different insurance zone you will need to override the ICD10 and CPT if appropriate.
Closure 3 Information: This tab is for additional flaps and grafts above and beyond our usual structured repairs.  Please note if you enter information here it will not currently bill and you will need to add the billing information manually.
Wound Care: No ointment
Dressing: steri-strips
Wound Care (No Sutures): Petrolatum
Dressing (No Sutures): dry sterile dressing
Suture Removal: 7 days
Unna Boot Text: An Unna boot was placed to help immobilize the limb and facilitate more rapid healing.
Home Suture Removal Text: Patient was provided instructions on removing sutures and will remove their sutures at home.  If they have any questions or difficulties they will call the office.
Post-Care Instructions: I reviewed with the patient in detail post-care instructions. Patient is not to engage in any heavy lifting, exercise, or swimming for the next 14 days. Should the patient develop any fevers, chills, bleeding, severe pain patient will contact the office immediately.
Pain Refusal Text: I offered to prescribe pain medication but the patient refused to take this medication.
Mauc Instructions: By selecting yes to the question below the MAUC number will be added into the note.  This will be calculated automatically based on the diagnosis chosen, the size entered, the body zone selected (H,M,L) and the specific indications you chose. You will also have the option to override the Mohs AUC if you disagree with the automatically calculated number and this option is found in the Case Summary tab.
Where Do You Want The Question To Include Opioid Counseling Located?: Case Summary Tab
Eye Protection Verbiage: Before proceeding with the stage, a plastic scleral shield was inserted. The globe was anesthetized with a few drops of 1% lidocaine with 1:100,000 epinephrine. Then, an appropriate sized scleral shield was chosen and coated with lacrilube ointment. The shield was gently inserted and left in place for the duration of each stage. After the stage was completed, the shield was gently removed.
Mohs Method Verbiage: An incision at a 45 degree angle following the standard Mohs approach was done and the specimen was harvested as a microscopic controlled layer.
Surgeon/Pathologist Verbiage (Will Incorporate Name Of Surgeon From Intro If Not Blank): operated in two distinct and integrated capacities as the surgeon and pathologist.
Mohs Histo Method Verbiage: Each section was then chromacoded and processed in the Mohs lab using the Mohs protocol and submitted for frozen section.
Subsequent Stages Histo Method Verbiage: Using a similar technique to that described above, a thin layer of tissue was removed from all areas where tumor was visible on the previous stage.  The tissue was again oriented, mapped, dyed, and processed as above.
Mohs Rapid Report Verbiage: The area of clinically evident tumor was marked with skin marking ink and appropriately hatched.  The initial incision was made following the Mohs approach through the skin.  The specimen was taken to the lab, divided into the necessary number of pieces, chromacoded and processed according to the Mohs protocol.  This was repeated in successive stages until a tumor free defect was achieved.
Complex Repair Preamble Text (Leave Blank If You Do Not Want): Extensive wide undermining was performed at least 2 cm in all directions.
Intermediate Repair Preamble Text (Leave Blank If You Do Not Want): Undermining was performed with blunt dissection.
M-Plasty Complex Repair Preamble Text (Leave Blank If You Do Not Want): Extensive wide undermining was performed.
Non-Graft Cartilage Fenestration Text: The cartilage was fenestrated with a 2mm punch biopsy to help facilitate healing.
Graft Cartilage Fenestration Text: The cartilage was fenestrated with a 2mm punch biopsy to help facilitate graft survival and healing.
Secondary Intention Text (Leave Blank If You Do Not Want): The defect will heal with secondary intention.
No Repair - Repaired With Adjacent Surgical Defect Text (Leave Blank If You Do Not Want): After obtaining clear surgical margins the defect was repaired concurrently with another surgical defect which was in close approximation.
Advancement Flap (Single) Text: The defect edges were debeveled with a #15 scalpel blade.  Given the location of the defect and the proximity to free margins a single advancement flap was deemed most appropriate.  Using a sterile surgical marker, an appropriate advancement flap was drawn incorporating the defect and placing the expected incisions within the relaxed skin tension lines where possible.    The area thus outlined was incised deep to adipose tissue with a #15 scalpel blade.  The skin margins were undermined to an appropriate distance in all directions utilizing iris scissors.
Advancement Flap (Double) Text: The defect edges were debeveled with a #15 scalpel blade.  Given the location of the defect and the proximity to free margins a double advancement flap was deemed most appropriate.  Using a sterile surgical marker, the appropriate advancement flaps were drawn incorporating the defect and placing the expected incisions within the relaxed skin tension lines where possible.    The area thus outlined was incised deep to adipose tissue with a #15 scalpel blade.  The skin margins were undermined to an appropriate distance in all directions utilizing iris scissors.
Burow's Advancement Flap Text: The defect edges were debeveled with a #15 scalpel blade.  Given the location of the defect and the proximity to free margins a Burow's advancement flap was deemed most appropriate.  Using a sterile surgical marker, the appropriate advancement flap was drawn incorporating the defect and placing the expected incisions within the relaxed skin tension lines where possible.    The area thus outlined was incised deep to adipose tissue with a #15 scalpel blade.  The skin margins were undermined to an appropriate distance in all directions utilizing iris scissors.
Chonodrocutaneous Helical Advancement Flap Text: The defect edges were debeveled with a #15 scalpel blade.  Given the location of the defect and the proximity to free margins a chondrocutaneous helical advancement flap was deemed most appropriate.  Using a sterile surgical marker, the appropriate advancement flap was drawn incorporating the defect and placing the expected incisions within the relaxed skin tension lines where possible.    The area thus outlined was incised deep to adipose tissue with a #15 scalpel blade.  The skin margins were undermined to an appropriate distance in all directions utilizing iris scissors.
Crescentic Advancement Flap Text: The defect edges were debeveled with a #15 scalpel blade.  Given the location of the defect and the proximity to free margins a crescentic advancement flap was deemed most appropriate.  Using a sterile surgical marker, the appropriate advancement flap was drawn incorporating the defect and placing the expected incisions within the relaxed skin tension lines where possible.    The area thus outlined was incised deep to adipose tissue with a #15 scalpel blade.  The skin margins were undermined to an appropriate distance in all directions utilizing iris scissors.
A-T Advancement Flap Text: The defect edges were debeveled with a #15 scalpel blade.  Given the location of the defect, shape of the defect and the proximity to free margins an A-T advancement flap was deemed most appropriate.  Using a sterile surgical marker, an appropriate advancement flap was drawn incorporating the defect and placing the expected incisions within the relaxed skin tension lines where possible.    The area thus outlined was incised deep to adipose tissue with a #15 scalpel blade.  The skin margins were undermined to an appropriate distance in all directions utilizing iris scissors.
O-T Advancement Flap Text: The defect edges were debeveled with a #15 scalpel blade.  Given the location of the defect, shape of the defect and the proximity to free margins an O-T advancement flap was deemed most appropriate.  Using a sterile surgical marker, an appropriate advancement flap was drawn incorporating the defect and placing the expected incisions within the relaxed skin tension lines where possible.    The area thus outlined was incised deep to adipose tissue with a #15 scalpel blade.  The skin margins were undermined to an appropriate distance in all directions utilizing iris scissors.
O-L Flap Text: The defect edges were debeveled with a #15 scalpel blade.  Given the location of the defect, shape of the defect and the proximity to free margins an O-L flap was deemed most appropriate.  Using a sterile surgical marker, an appropriate advancement flap was drawn incorporating the defect and placing the expected incisions within the relaxed skin tension lines where possible.    The area thus outlined was incised deep to adipose tissue with a #15 scalpel blade.  The skin margins were undermined to an appropriate distance in all directions utilizing iris scissors.
O-Z Flap Text: The defect edges were debeveled with a #15 scalpel blade.  Given the location of the defect, shape of the defect and the proximity to free margins an O-Z flap was deemed most appropriate.  Using a sterile surgical marker, an appropriate transposition flap was drawn incorporating the defect and placing the expected incisions within the relaxed skin tension lines where possible. The area thus outlined was incised deep to adipose tissue with a #15 scalpel blade.  The skin margins were undermined to an appropriate distance in all directions utilizing iris scissors.
Double O-Z Flap Text: The defect edges were debeveled with a #15 scalpel blade.  Given the location of the defect, shape of the defect and the proximity to free margins a Double O-Z flap was deemed most appropriate.  Using a sterile surgical marker, an appropriate transposition flap was drawn incorporating the defect and placing the expected incisions within the relaxed skin tension lines where possible. The area thus outlined was incised deep to adipose tissue with a #15 scalpel blade.  The skin margins were undermined to an appropriate distance in all directions utilizing iris scissors.
V-Y Flap Text: The defect edges were debeveled with a #15 scalpel blade.  Given the location of the defect, shape of the defect and the proximity to free margins a V-Y flap was deemed most appropriate.  Using a sterile surgical marker, an appropriate advancement flap was drawn incorporating the defect and placing the expected incisions within the relaxed skin tension lines where possible.    The area thus outlined was incised deep to adipose tissue with a #15 scalpel blade.  The skin margins were undermined to an appropriate distance in all directions utilizing iris scissors.
Advancement-Rotation Flap Text: The defect edges were debeveled with a #15 scalpel blade.  Given the location of the defect, shape of the defect and the proximity to free margins an advancement-rotation flap was deemed most appropriate.  Using a sterile surgical marker, an appropriate flap was drawn incorporating the defect and placing the expected incisions within the relaxed skin tension lines where possible. The area thus outlined was incised deep to adipose tissue with a #15 scalpel blade.  The skin margins were undermined to an appropriate distance in all directions utilizing iris scissors.
Mercedes Flap Text: The defect edges were debeveled with a #15 scalpel blade.  Given the location of the defect, shape of the defect and the proximity to free margins a Mercedes flap was deemed most appropriate.  Using a sterile surgical marker, an appropriate advancement flap was drawn incorporating the defect and placing the expected incisions within the relaxed skin tension lines where possible. The area thus outlined was incised deep to adipose tissue with a #15 scalpel blade.  The skin margins were undermined to an appropriate distance in all directions utilizing iris scissors.
Modified Advancement Flap Text: The defect edges were debeveled with a #15 scalpel blade.  Given the location of the defect, shape of the defect and the proximity to free margins a modified advancement flap was deemed most appropriate.  Using a sterile surgical marker, an appropriate advancement flap was drawn incorporating the defect and placing the expected incisions within the relaxed skin tension lines where possible.    The area thus outlined was incised deep to adipose tissue with a #15 scalpel blade.  The skin margins were undermined to an appropriate distance in all directions utilizing iris scissors.
Mucosal Advancement Flap Text: Given the location of the defect, shape of the defect and the proximity to free margins a mucosal advancement flap was deemed most appropriate. Incisions were made with a 15 blade scalpel in the appropriate fashion along the cutaneous vermilion border and the mucosal lip. The remaining actinically damaged mucosal tissue was excised.  The mucosal advancement flap was then elevated to the gingival sulcus with care taken to preserve the neurovascular structures and advanced into the primary defect. Care was taken to ensure that precise realignment of the vermilion border was achieved.
Peng Advancement Flap Text: The defect edges were debeveled with a #15 scalpel blade.  Given the location of the defect, shape of the defect and the proximity to free margins a Peng advancement flap was deemed most appropriate.  Using a sterile surgical marker, an appropriate advancement flap was drawn incorporating the defect and placing the expected incisions within the relaxed skin tension lines where possible. The area thus outlined was incised deep to adipose tissue with a #15 scalpel blade.  The skin margins were undermined to an appropriate distance in all directions utilizing iris scissors.
Hatchet Flap Text: The defect edges were debeveled with a #15 scalpel blade.  Given the location of the defect, shape of the defect and the proximity to free margins a hatchet flap based from the glabella was deemed most appropriate.  Using a sterile surgical marker, an appropriate glabellar hatchet flap was drawn incorporating the defect and placing the expected incisions within the relaxed skin tension lines where possible.    The area thus outlined was incised deep to adipose tissue with a #15 scalpel blade.  The skin margins were undermined to an appropriate distance in all directions utilizing iris scissors.
Rotation Flap Text: The defect edges were debeveled with a #15 scalpel blade.  Given the location of the defect, shape of the defect and the proximity to free margins a rotation flap was deemed most appropriate.  Using a sterile surgical marker, an appropriate rotation flap was drawn incorporating the defect and placing the expected incisions within the relaxed skin tension lines where possible.    The area thus outlined was incised deep to adipose tissue with a #15 scalpel blade.  The skin margins were undermined to an appropriate distance in all directions utilizing iris scissors.
Spiral Flap Text: The defect edges were debeveled with a #15 scalpel blade.  Given the location of the defect, shape of the defect and the proximity to free margins a spiral flap was deemed most appropriate.  Using a sterile surgical marker, an appropriate rotation flap was drawn incorporating the defect and placing the expected incisions within the relaxed skin tension lines where possible. The area thus outlined was incised deep to adipose tissue with a #15 scalpel blade.  The skin margins were undermined to an appropriate distance in all directions utilizing iris scissors.
Star Wedge Flap Text: The defect edges were debeveled with a #15 scalpel blade.  Given the location of the defect, shape of the defect and the proximity to free margins a star wedge flap was deemed most appropriate.  Using a sterile surgical marker, an appropriate rotation flap was drawn incorporating the defect and placing the expected incisions within the relaxed skin tension lines where possible. The area thus outlined was incised deep to adipose tissue with a #15 scalpel blade.  The skin margins were undermined to an appropriate distance in all directions utilizing iris scissors.
Transposition Flap Text: The defect edges were debeveled with a #15 scalpel blade.  Given the location of the defect and the proximity to free margins a transposition flap was deemed most appropriate.  Using a sterile surgical marker, an appropriate transposition flap was drawn incorporating the defect.    The area thus outlined was incised deep to adipose tissue with a #15 scalpel blade.  The skin margins were undermined to an appropriate distance in all directions utilizing iris scissors.
Muscle Hinge Flap Text: The defect edges were debeveled with a #15 scalpel blade.  Given the size, depth and location of the defect and the proximity to free margins a muscle hinge flap was deemed most appropriate.  Using a sterile surgical marker, an appropriate hinge flap was drawn incorporating the defect. The area thus outlined was incised with a #15 scalpel blade.  The skin margins were undermined to an appropriate distance in all directions utilizing iris scissors.
Nasal Turnover Hinge Flap Text: The defect edges were debeveled with a #15 scalpel blade.  Given the size, depth, location of the defect and the defect being full thickness a nasal turnover hinge flap was deemed most appropriate.  Using a sterile surgical marker, an appropriate hinge flap was drawn incorporating the defect. The area thus outlined was incised with a #15 scalpel blade. The flap was designed to recreate the nasal mucosal lining and the alar rim. The skin margins were undermined to an appropriate distance in all directions utilizing iris scissors.
Nasalis-Muscle-Based Myocutaneous Island Pedicle Flap Text: Using a #15 blade, an incision was made around the donor flap to the level of the nasalis muscle. Wide lateral undermining was then performed in both the subcutaneous plane above the nasalis muscle, and in a submuscular plane just above periosteum. This allowed the formation of a free nasalis muscle axial pedicle (based on the angular artery) which was still attached to the actual cutaneous flap, increasing its mobility and vascular viability. Hemostasis was obtained with pinpoint electrocoagulation. The flap was mobilized into position and the pivotal anchor points positioned and stabilized with buried interrupted sutures. Subcutaneous and dermal tissues were closed in a multilayered fashion with sutures. Tissue redundancies were excised, and the epidermal edges were apposed without significant tension and sutured with sutures.
Orbicularis Oris Muscle Flap Text: The defect edges were debeveled with a #15 scalpel blade.  Given that the defect affected the competency of the oral sphincter an obicularis oris muscle flap was deemed most appropriate to restore this competency and normal muscle function.  Using a sterile surgical marker, an appropriate flap was drawn incorporating the defect. The area thus outlined was incised with a #15 scalpel blade.
Melolabial Transposition Flap Text: The defect edges were debeveled with a #15 scalpel blade.  Given the location of the defect and the proximity to free margins a melolabial flap was deemed most appropriate.  Using a sterile surgical marker, an appropriate melolabial transposition flap was drawn incorporating the defect.    The area thus outlined was incised deep to adipose tissue with a #15 scalpel blade.  The skin margins were undermined to an appropriate distance in all directions utilizing iris scissors.
Rhombic Flap Text: The defect edges were debeveled with a #15 scalpel blade.  Given the location of the defect and the proximity to free margins a rhombic flap was deemed most appropriate.  Using a sterile surgical marker, an appropriate rhombic flap was drawn incorporating the defect.    The area thus outlined was incised deep to adipose tissue with a #15 scalpel blade.  The skin margins were undermined to an appropriate distance in all directions utilizing iris scissors.
Rhomboid Transposition Flap Text: The defect edges were debeveled with a #15 scalpel blade.  Given the location of the defect and the proximity to free margins a rhomboid transposition flap was deemed most appropriate.  Using a sterile surgical marker, an appropriate rhomboid flap was drawn incorporating the defect.    The area thus outlined was incised deep to adipose tissue with a #15 scalpel blade.  The skin margins were undermined to an appropriate distance in all directions utilizing iris scissors.
Bi-Rhombic Flap Text: The defect edges were debeveled with a #15 scalpel blade.  Given the location of the defect and the proximity to free margins a bi-rhombic flap was deemed most appropriate.  Using a sterile surgical marker, an appropriate rhombic flap was drawn incorporating the defect. The area thus outlined was incised deep to adipose tissue with a #15 scalpel blade.  The skin margins were undermined to an appropriate distance in all directions utilizing iris scissors.
Helical Rim Advancement Flap Text: The defect edges were debeveled with a #15 blade scalpel.  Given the location of the defect and the proximity to free margins (helical rim) a double helical rim advancement flap was deemed most appropriate.  Using a sterile surgical marker, the appropriate advancement flaps were drawn incorporating the defect and placing the expected incisions between the helical rim and antihelix where possible.  The area thus outlined was incised through and through with a #15 scalpel blade.  With a skin hook and iris scissors, the flaps were gently and sharply undermined and freed up.
Bilateral Helical Rim Advancement Flap Text: The defect edges were debeveled with a #15 blade scalpel.  Given the location of the defect and the proximity to free margins (helical rim) a bilateral helical rim advancement flap was deemed most appropriate.  Using a sterile surgical marker, the appropriate advancement flaps were drawn incorporating the defect and placing the expected incisions between the helical rim and antihelix where possible.  The area thus outlined was incised through and through with a #15 scalpel blade.  With a skin hook and iris scissors, the flaps were gently and sharply undermined and freed up.
Ear Star Wedge Flap Text: The defect edges were debeveled with a #15 blade scalpel.  Given the location of the defect and the proximity to free margins (helical rim) an ear star wedge flap was deemed most appropriate.  Using a sterile surgical marker, the appropriate flap was drawn incorporating the defect and placing the expected incisions between the helical rim and antihelix where possible.  The area thus outlined was incised through and through with a #15 scalpel blade.
Banner Transposition Flap Text: The defect edges were debeveled with a #15 scalpel blade.  Given the location of the defect and the proximity to free margins a Banner transposition flap was deemed most appropriate.  Using a sterile surgical marker, an appropriate flap drawn around the defect. The area thus outlined was incised deep to adipose tissue with a #15 scalpel blade.  The skin margins were undermined to an appropriate distance in all directions utilizing iris scissors.
Bilobed Flap Text: The defect edges were debeveled with a #15 scalpel blade.  Given the location of the defect and the proximity to free margins a bilobe flap was deemed most appropriate.  Using a sterile surgical marker, an appropriate bilobe flap drawn around the defect.    The area thus outlined was incised deep to adipose tissue with a #15 scalpel blade.  The skin margins were undermined to an appropriate distance in all directions utilizing iris scissors.
Bilobed Transposition Flap Text: The defect edges were debeveled with a #15 scalpel blade.  Given the location of the defect and the proximity to free margins a bilobed transposition flap was deemed most appropriate.  Using a sterile surgical marker, an appropriate bilobe flap drawn around the defect.    The area thus outlined was incised deep to adipose tissue with a #15 scalpel blade.  The skin margins were undermined to an appropriate distance in all directions utilizing iris scissors.
Trilobed Flap Text: The defect edges were debeveled with a #15 scalpel blade.  Given the location of the defect and the proximity to free margins a trilobed flap was deemed most appropriate.  Using a sterile surgical marker, an appropriate trilobed flap drawn around the defect.    The area thus outlined was incised deep to adipose tissue with a #15 scalpel blade.  The skin margins were undermined to an appropriate distance in all directions utilizing iris scissors.
Dorsal Nasal Flap Text: The defect edges were debeveled with a #15 scalpel blade.  Given the location of the defect and the proximity to free margins a dorsal nasal flap,based upon the glabellar folds, was deemed most appropriate.  Using a sterile surgical marker, an appropriate dorsal nasal flap was drawn around the defect.    The area thus outlined was incised deep to adipose tissue with a #15 scalpel blade.  The skin margins were undermined to an appropriate distance in all directions utilizing iris scissors.
Island Pedicle Flap Text: The defect edges were debeveled with a #15 scalpel blade.  Given the location of the defect, shape of the defect and the proximity to free margins an island pedicle advancement flap was deemed most appropriate.  Using a sterile surgical marker, an appropriate advancement flap was drawn incorporating the defect, outlining the appropriate donor tissue and placing the expected incisions within the relaxed skin tension lines where possible.    The area thus outlined was incised deep to adipose tissue with a #15 scalpel blade.  The skin margins were undermined to an appropriate distance in all directions around the primary defect and laterally outward around the island pedicle utilizing iris scissors.  There was minimal undermining beneath the pedicle flap.
Island Pedicle Flap With Canthal Suspension Text: The defect edges were debeveled with a #15 scalpel blade.  Given the location of the defect, shape of the defect and the proximity to free margins an island pedicle advancement flap was deemed most appropriate.  Using a sterile surgical marker, an appropriate advancement flap was drawn incorporating the defect, outlining the appropriate donor tissue and placing the expected incisions within the relaxed skin tension lines where possible. The area thus outlined was incised deep to adipose tissue with a #15 scalpel blade.  The skin margins were undermined to an appropriate distance in all directions around the primary defect and laterally outward around the island pedicle utilizing iris scissors.  There was minimal undermining beneath the pedicle flap. A suspension suture was placed in the canthal tendon to prevent tension and prevent ectropion.
Alar Island Pedicle Flap Text: The defect edges were debeveled with a #15 scalpel blade.  Given the location of the defect, shape of the defect and the proximity to the alar rim an island pedicle advancement flap was deemed most appropriate.  Using a sterile surgical marker, an appropriate advancement flap was drawn incorporating the defect, outlining the appropriate donor tissue and placing the expected incisions within the nasal ala running parallel to the alar rim. The area thus outlined was incised with a #15 scalpel blade.  The skin margins were undermined minimally to an appropriate distance in all directions around the primary defect and laterally outward around the island pedicle utilizing iris scissors.  There was minimal undermining beneath the pedicle flap.
Double Island Pedicle Flap Text: The defect edges were debeveled with a #15 scalpel blade.  Given the location of the defect, shape of the defect and the proximity to free margins a double island pedicle advancement flap was deemed most appropriate.  Using a sterile surgical marker, an appropriate advancement flap was drawn incorporating the defect, outlining the appropriate donor tissue and placing the expected incisions within the relaxed skin tension lines where possible.    The area thus outlined was incised deep to adipose tissue with a #15 scalpel blade.  The skin margins were undermined to an appropriate distance in all directions around the primary defect and laterally outward around the island pedicle utilizing iris scissors.  There was minimal undermining beneath the pedicle flap.
Island Pedicle Flap-Requiring Vessel Identification Text: The defect edges were debeveled with a #15 scalpel blade.  Given the location of the defect, shape of the defect and the proximity to free margins an island pedicle advancement flap was deemed most appropriate.  Using a sterile surgical marker, an appropriate advancement flap was drawn, based on the axial vessel mentioned above, incorporating the defect, outlining the appropriate donor tissue and placing the expected incisions within the relaxed skin tension lines where possible.    The area thus outlined was incised deep to adipose tissue with a #15 scalpel blade.  The skin margins were undermined to an appropriate distance in all directions around the primary defect and laterally outward around the island pedicle utilizing iris scissors.  There was minimal undermining beneath the pedicle flap.
Keystone Flap Text: The defect edges were debeveled with a #15 scalpel blade.  Given the location of the defect, shape of the defect a keystone flap was deemed most appropriate.  Using a sterile surgical marker, an appropriate keystone flap was drawn incorporating the defect, outlining the appropriate donor tissue and placing the expected incisions within the relaxed skin tension lines where possible. The area thus outlined was incised deep to adipose tissue with a #15 scalpel blade.  The skin margins were undermined to an appropriate distance in all directions around the primary defect and laterally outward around the flap utilizing iris scissors.
O-T Plasty Text: The defect edges were debeveled with a #15 scalpel blade.  Given the location of the defect, shape of the defect and the proximity to free margins an O-T plasty was deemed most appropriate.  Using a sterile surgical marker, an appropriate O-T plasty was drawn incorporating the defect and placing the expected incisions within the relaxed skin tension lines where possible.    The area thus outlined was incised deep to adipose tissue with a #15 scalpel blade.  The skin margins were undermined to an appropriate distance in all directions utilizing iris scissors.
O-Z Plasty Text: The defect edges were debeveled with a #15 scalpel blade.  Given the location of the defect, shape of the defect and the proximity to free margins an O-Z plasty (double transposition flap) was deemed most appropriate.  Using a sterile surgical marker, the appropriate transposition flaps were drawn incorporating the defect and placing the expected incisions within the relaxed skin tension lines where possible.    The area thus outlined was incised deep to adipose tissue with a #15 scalpel blade.  The skin margins were undermined to an appropriate distance in all directions utilizing iris scissors.  Hemostasis was achieved with electrocautery.  The flaps were then transposed into place, one clockwise and the other counterclockwise, and anchored with interrupted buried subcutaneous sutures.
Double O-Z Plasty Text: The defect edges were debeveled with a #15 scalpel blade.  Given the location of the defect, shape of the defect and the proximity to free margins a Double O-Z plasty (double transposition flap) was deemed most appropriate.  Using a sterile surgical marker, the appropriate transposition flaps were drawn incorporating the defect and placing the expected incisions within the relaxed skin tension lines where possible. The area thus outlined was incised deep to adipose tissue with a #15 scalpel blade.  The skin margins were undermined to an appropriate distance in all directions utilizing iris scissors.  Hemostasis was achieved with electrocautery.  The flaps were then transposed into place, one clockwise and the other counterclockwise, and anchored with interrupted buried subcutaneous sutures.
V-Y Plasty Text: The defect edges were debeveled with a #15 scalpel blade.  Given the location of the defect, shape of the defect and the proximity to free margins an V-Y advancement flap was deemed most appropriate.  Using a sterile surgical marker, an appropriate advancement flap was drawn incorporating the defect and placing the expected incisions within the relaxed skin tension lines where possible.    The area thus outlined was incised deep to adipose tissue with a #15 scalpel blade.  The skin margins were undermined to an appropriate distance in all directions utilizing iris scissors.
H Plasty Text: Given the location of the defect, shape of the defect and the proximity to free margins a H-plasty was deemed most appropriate for repair.  Using a sterile surgical marker, the appropriate advancement arms of the H-plasty were drawn incorporating the defect and placing the expected incisions within the relaxed skin tension lines where possible. The area thus outlined was incised deep to adipose tissue with a #15 scalpel blade. The skin margins were undermined to an appropriate distance in all directions utilizing iris scissors.  The opposing advancement arms were then advanced into place in opposite direction and anchored with interrupted buried subcutaneous sutures.
W Plasty Text: The lesion was extirpated to the level of the fat with a #15 scalpel blade.  Given the location of the defect, shape of the defect and the proximity to free margins a W-plasty was deemed most appropriate for repair.  Using a sterile surgical marker, the appropriate transposition arms of the W-plasty were drawn incorporating the defect and placing the expected incisions within the relaxed skin tension lines where possible.    The area thus outlined was incised deep to adipose tissue with a #15 scalpel blade.  The skin margins were undermined to an appropriate distance in all directions utilizing iris scissors.  The opposing transposition arms were then transposed into place in opposite direction and anchored with interrupted buried subcutaneous sutures.
Z Plasty Text: The lesion was extirpated to the level of the fat with a #15 scalpel blade.  Given the location of the defect, shape of the defect and the proximity to free margins a Z-plasty was deemed most appropriate for repair.  Using a sterile surgical marker, the appropriate transposition arms of the Z-plasty were drawn incorporating the defect and placing the expected incisions within the relaxed skin tension lines where possible.    The area thus outlined was incised deep to adipose tissue with a #15 scalpel blade.  The skin margins were undermined to an appropriate distance in all directions utilizing iris scissors.  The opposing transposition arms were then transposed into place in opposite direction and anchored with interrupted buried subcutaneous sutures.
Zygomaticofacial Flap Text: Given the location of the defect, shape of the defect and the proximity to free margins a zygomaticofacial flap was deemed most appropriate for repair.  Using a sterile surgical marker, the appropriate flap was drawn incorporating the defect and placing the expected incisions within the relaxed skin tension lines where possible. The area thus outlined was incised deep to adipose tissue with a #15 scalpel blade with preservation of a vascular pedicle.  The skin margins were undermined to an appropriate distance in all directions utilizing iris scissors.  The flap was then placed into the defect and anchored with interrupted buried subcutaneous sutures.
Cheek Interpolation Flap Text: A decision was made to reconstruct the defect utilizing an interpolation axial flap and a staged reconstruction.  A telfa template was made of the defect.  This telfa template was then used to outline the Cheek Interpolation flap.  The donor area for the pedicle flap was then injected with anesthesia.  The flap was excised through the skin and subcutaneous tissue down to the layer of the underlying musculature.  The interpolation flap was carefully excised within this deep plane to maintain its blood supply.  The edges of the donor site were undermined.   The donor site was closed in a primary fashion.  The pedicle was then rotated into position and sutured.  Once the tube was sutured into place, adequate blood supply was confirmed with blanching and refill.  The pedicle was then wrapped with xeroform gauze and dressed appropriately with a telfa and gauze bandage to ensure continued blood supply and protect the attached pedicle.
Cheek-To-Nose Interpolation Flap Text: A decision was made to reconstruct the defect utilizing an interpolation axial flap and a staged reconstruction.  A telfa template was made of the defect.  This telfa template was then used to outline the Cheek-To-Nose Interpolation flap.  The donor area for the pedicle flap was then injected with anesthesia.  The flap was excised through the skin and subcutaneous tissue down to the layer of the underlying musculature.  The interpolation flap was carefully excised within this deep plane to maintain its blood supply.  The edges of the donor site were undermined.   The donor site was closed in a primary fashion.  The pedicle was then rotated into position and sutured.  Once the tube was sutured into place, adequate blood supply was confirmed with blanching and refill.  The pedicle was then wrapped with xeroform gauze and dressed appropriately with a telfa and gauze bandage to ensure continued blood supply and protect the attached pedicle.
Interpolation Flap Text: A decision was made to reconstruct the defect utilizing an interpolation axial flap and a staged reconstruction.  A telfa template was made of the defect.  This telfa template was then used to outline the interpolation flap.  The donor area for the pedicle flap was then injected with anesthesia.  The flap was excised through the skin and subcutaneous tissue down to the layer of the underlying musculature.  The interpolation flap was carefully excised within this deep plane to maintain its blood supply.  The edges of the donor site were undermined.   The donor site was closed in a primary fashion.  The pedicle was then rotated into position and sutured.  Once the tube was sutured into place, adequate blood supply was confirmed with blanching and refill.  The pedicle was then wrapped with xeroform gauze and dressed appropriately with a telfa and gauze bandage to ensure continued blood supply and protect the attached pedicle.
Melolabial Interpolation Flap Text: A decision was made to reconstruct the defect utilizing an interpolation axial flap and a staged reconstruction.  A telfa template was made of the defect.  This telfa template was then used to outline the melolabial interpolation flap.  The donor area for the pedicle flap was then injected with anesthesia.  The flap was excised through the skin and subcutaneous tissue down to the layer of the underlying musculature.  The pedicle flap was carefully excised within this deep plane to maintain its blood supply.  The edges of the donor site were undermined.   The donor site was closed in a primary fashion.  The pedicle was then rotated into position and sutured.  Once the tube was sutured into place, adequate blood supply was confirmed with blanching and refill.  The pedicle was then wrapped with xeroform gauze and dressed appropriately with a telfa and gauze bandage to ensure continued blood supply and protect the attached pedicle.
Mastoid Interpolation Flap Text: A decision was made to reconstruct the defect utilizing an interpolation axial flap and a staged reconstruction.  A telfa template was made of the defect.  This telfa template was then used to outline the mastoid interpolation flap.  The donor area for the pedicle flap was then injected with anesthesia.  The flap was excised through the skin and subcutaneous tissue down to the layer of the underlying musculature.  The pedicle flap was carefully excised within this deep plane to maintain its blood supply.  The edges of the donor site were undermined.   The donor site was closed in a primary fashion.  The pedicle was then rotated into position and sutured.  Once the tube was sutured into place, adequate blood supply was confirmed with blanching and refill.  The pedicle was then wrapped with xeroform gauze and dressed appropriately with a telfa and gauze bandage to ensure continued blood supply and protect the attached pedicle.
Posterior Auricular Interpolation Flap Text: A decision was made to reconstruct the defect utilizing an interpolation axial flap and a staged reconstruction.  A telfa template was made of the defect.  This telfa template was then used to outline the posterior auricular interpolation flap.  The donor area for the pedicle flap was then injected with anesthesia.  The flap was excised through the skin and subcutaneous tissue down to the layer of the underlying musculature.  The pedicle flap was carefully excised within this deep plane to maintain its blood supply.  The edges of the donor site were undermined.   The donor site was closed in a primary fashion.  The pedicle was then rotated into position and sutured.  Once the tube was sutured into place, adequate blood supply was confirmed with blanching and refill.  The pedicle was then wrapped with xeroform gauze and dressed appropriately with a telfa and gauze bandage to ensure continued blood supply and protect the attached pedicle.
Paramedian Forehead Flap Text: A decision was made to reconstruct the defect utilizing an interpolation axial flap and a staged reconstruction.  A telfa template was made of the defect.  This telfa template was then used to outline the paramedian forehead pedicle flap.  The donor area for the pedicle flap was then injected with anesthesia.  The flap was excised through the skin and subcutaneous tissue down to the layer of the underlying musculature.  The pedicle flap was carefully excised within this deep plane to maintain its blood supply.  The edges of the donor site were undermined.   The donor site was closed in a primary fashion.  The pedicle was then rotated into position and sutured.  Once the tube was sutured into place, adequate blood supply was confirmed with blanching and refill.  The pedicle was then wrapped with xeroform gauze and dressed appropriately with a telfa and gauze bandage to ensure continued blood supply and protect the attached pedicle.
Cheiloplasty (Less Than 50%) Text: A decision was made to reconstruct the defect with a  cheiloplasty.  The defect was undermined extensively.  Additional obicularis oris muscle was excised with a 15 blade scalpel.  The defect was converted into a full thickness wedge, of less than 50% of the vertical height of the lip, to facilite a better cosmetic result.  Small vessels were then tied off with 5-0 monocyrl. The obicularis oris, superficial fascia, adipose and dermis were then reapproximated.  After the deeper layers were approximated the epidermis was reapproximated with particular care given to realign the vermilion border.
Cheiloplasty (Complex) Text: A decision was made to reconstruct the defect with a  cheiloplasty.  The defect was undermined extensively.  Additional obicularis oris muscle was excised with a 15 blade scalpel.  The defect was converted into a full thickness wedge to facilite a better cosmetic result.  Small vessels were then tied off with 5-0 monocyrl. The obicularis oris, superficial fascia, adipose and dermis were then reapproximated.  After the deeper layers were approximated the epidermis was reapproximated with particular care given to realign the vermilion border.
Ear Wedge Repair Text: A wedge excision was completed by carrying down an excision through the full thickness of the ear and cartilage with an inward facing Burow's triangle. The wound was then closed in a layered fashion.
Full Thickness Lip Wedge Repair (Flap) Text: Given the location of the defect and the proximity to free margins a full thickness wedge repair was deemed most appropriate.  Using a sterile surgical marker, the appropriate repair was drawn incorporating the defect and placing the expected incisions perpendicular to the vermilion border.  The vermilion border was also meticulously outlined to ensure appropriate reapproximation during the repair.  The area thus outlined was incised through and through with a #15 scalpel blade.  The muscularis and dermis were reaproximated with deep sutures following hemostasis. Care was taken to realign the vermilion border before proceeding with the superficial closure.  Once the vermilion was realigned the superfical and mucosal closure was finished.
Ftsg Text: The defect edges were debeveled with a #15 scalpel blade.  Given the location of the defect, shape of the defect and the proximity to free margins a full thickness skin graft was deemed most appropriate.  Using a sterile surgical marker, the primary defect shape was transferred to the donor site. The area thus outlined was incised deep to adipose tissue with a #15 scalpel blade.  The harvested graft was then trimmed of adipose tissue until only dermis and epidermis was left.  The skin margins of the secondary defect were undermined to an appropriate distance in all directions utilizing iris scissors.  The secondary defect was closed with interrupted buried subcutaneous sutures.  The skin edges were then re-apposed with running  sutures.  The skin graft was then placed in the primary defect and oriented appropriately.
Split-Thickness Skin Graft Text: The defect edges were debeveled with a #15 scalpel blade.  Given the location of the defect, shape of the defect and the proximity to free margins a split thickness skin graft was deemed most appropriate.  Using a sterile surgical marker, the primary defect shape was transferred to the donor site. The split thickness graft was then harvested.  The skin graft was then placed in the primary defect and oriented appropriately.
Burow's Graft Text: The defect edges were debeveled with a #15 scalpel blade.  Given the location of the defect, shape of the defect, the proximity to free margins and the presence of a standing cone deformity a Burow's skin graft was deemed most appropriate. The standing cone was removed and this tissue was then trimmed to the shape of the primary defect. The adipose tissue was also removed until only dermis and epidermis were left.  The skin margins of the secondary defect were undermined to an appropriate distance in all directions utilizing iris scissors.  The secondary defect was closed with interrupted buried subcutaneous sutures.  The skin edges were then re-apposed with running  sutures.  The skin graft was then placed in the primary defect and oriented appropriately.
Cartilage Graft Text: The defect edges were debeveled with a #15 scalpel blade.  Given the location of the defect, shape of the defect, the fact the defect involved a full thickness cartilage defect a cartilage graft was deemed most appropriate.  An appropriate donor site was identified, cleansed, and anesthetized. The cartilage graft was then harvested and transferred to the recipient site, oriented appropriately and then sutured into place.  The secondary defect was then repaired using a primary closure.
Composite Graft Text: The defect edges were debeveled with a #15 scalpel blade.  Given the location of the defect, shape of the defect, the proximity to free margins and the fact the defect was full thickness a composite graft was deemed most appropriate.  The defect was outline and then transferred to the donor site.  A full thickness graft was then excised from the donor site. The graft was then placed in the primary defect, oriented appropriately and then sutured into place.  The secondary defect was then repaired using a primary closure.
Epidermal Autograft Text: The defect edges were debeveled with a #15 scalpel blade.  Given the location of the defect, shape of the defect and the proximity to free margins an epidermal autograft was deemed most appropriate.  Using a sterile surgical marker, the primary defect shape was transferred to the donor site. The epidermal graft was then harvested.  The skin graft was then placed in the primary defect and oriented appropriately.
Dermal Autograft Text: The defect edges were debeveled with a #15 scalpel blade.  Given the location of the defect, shape of the defect and the proximity to free margins a dermal autograft was deemed most appropriate.  Using a sterile surgical marker, the primary defect shape was transferred to the donor site. The area thus outlined was incised deep to adipose tissue with a #15 scalpel blade.  The harvested graft was then trimmed of adipose and epidermal tissue until only dermis was left.  The skin graft was then placed in the primary defect and oriented appropriately.
Skin Substitute Text: The defect edges were debeveled with a #15 scalpel blade.  Given the location of the defect, shape of the defect and the proximity to free margins a skin substitute graft was deemed most appropriate.  The graft material was trimmed to fit the size of the defect. The graft was then placed in the primary defect and oriented appropriately.
Tissue Cultured Epidermal Autograft Text: The defect edges were debeveled with a #15 scalpel blade.  Given the location of the defect, shape of the defect and the proximity to free margins a tissue cultured epidermal autograft was deemed most appropriate.  The graft was then trimmed to fit the size of the defect.  The graft was then placed in the primary defect and oriented appropriately.
Xenograft Text: The defect edges were debeveled with a #15 scalpel blade.  Given the location of the defect, shape of the defect and the proximity to free margins a xenograft was deemed most appropriate.  The graft was then trimmed to fit the size of the defect.  The graft was then placed in the primary defect and oriented appropriately.
Purse String (Simple) Text: Given the location of the defect and the characteristics of the surrounding skin a purse string closure was deemed most appropriate.  Undermining was performed circumfirentially around the surgical defect.  A purse string suture was then placed and tightened.
Purse String (Intermediate) Text: Given the location of the defect and the characteristics of the surrounding skin a purse string intermediate closure was deemed most appropriate.  Undermining was performed circumfirentially around the surgical defect.  A purse string suture was then placed and tightened.
Partial Purse String (Simple) Text: Given the location of the defect and the characteristics of the surrounding skin a simple purse string closure was deemed most appropriate.  Undermining was performed circumfirentially around the surgical defect.  A purse string suture was then placed and tightened. Wound tension only allowed a partial closure of the circular defect.
Partial Purse String (Intermediate) Text: Given the location of the defect and the characteristics of the surrounding skin an intermediate purse string closure was deemed most appropriate.  Undermining was performed circumfirentially around the surgical defect.  A purse string suture was then placed and tightened. Wound tension only allowed a partial closure of the circular defect.
Localized Dermabrasion With Wire Brush Text: The patient was draped in routine manner.  Localized dermabrasion using 3 x 17 mm wire brush was performed in routine manner to papillary dermis. This spot dermabrasion is being performed to complete skin cancer reconstruction. It also will eliminate the other sun damaged precancerous cells that are known to be part of the regional effect of a lifetime's worth of sun exposure. This localized dermabrasion is therapeutic and should not be considered cosmetic in any regard.
Tarsorrhaphy Text: A tarsorrhaphy was performed using Frost sutures.
Complex Repair And Flap Additional Text (Will Appearing After The Standard Complex Repair Text): The complex repair was not sufficient to completely close the primary defect. The remaining additional defect was repaired with the flap mentioned below.
Complex Repair And Graft Additional Text (Will Appearing After The Standard Complex Repair Text): The complex repair was not sufficient to completely close the primary defect. The remaining additional defect was repaired with the graft mentioned below.
Unique Flap 1 Name: Myocutaneous Island pedicle Flap
Unique Flap 2 Name: Peng Flap
Unique Flap 3 Name: Mercedes Flap
Unique Flap 4 Name: Banner Flap
Unique Flap 5 Name: tunneled myocutaneous flap
Unique Flap 1 Text: A decision was made to reconstruct the defect utilizing a myocutaneous Island pedicle Flap based on the levator labii superioris muscle.  A telfa template was made of the defect.  This telfa template was then used to outline the myocutaneous flap, based along the meilolabial fold.  The donor area for the pedicle flap was then injected with anesthesia.  The flap was excised through the skin and subcutaneous tissue down to the layer of the underlying musculature.  The myocutaneous flap was carefully excised within this deep plane to maintain its blood supply. Based on the muscle. The edges of the donor site were undermined.   The donor site was closed in a primary fashion to the point of transposition.  The pedicle was then transposed into position and sutured.  Once the flap was sutured into place, adequate blood supply was confirmed with blanching and refill.
Unique Flap 2 Text: A decision was made to reconstruct the defect utilizing a Peng Flap (Bilateral Advancement Rotation Flap). Given the location of the defect and the proximity to free margins, this flap was deemed most appropriate.  Using a sterile surgical marker, the appropriate rotation flaps were drawn incorporating the defect and placing the expected incisions within the relaxed skin tension lines where possible.    The area thus outlined was incised deep to adipose tissue with a #15 scalpel blade.  The skin margins were undermined to an appropriate distance in all directions utilizing iris scissors.
Unique Flap 3 Text: The defect edges were debeveled with a #15 scalpel blade.  Given the location of the defect, shape of the defect and the proximity to free margins a Mercedes (double advancement flap) was deemed most appropriate.  Using a sterile surgical marker, the appropriate transposition flaps were drawn incorporating the defect and placing the expected incisions within the relaxed skin tension lines where possible.    The area thus outlined was incised deep to adipose tissue with a #15 scalpel blade.  The skin margins were undermined to an appropriate distance in all directions utilizing iris scissors.  Hemostasis was achieved with electrocautery.  The flaps were then advanced into the defect and anchored with interrupted buried subcutaneous sutures.
Unique Flap 4 Text: The defect edges were debeveled with a #15 scalpel blade.  Given the location of the defect and the proximity to free margins a Banner transposition flap was deemed most appropriate.  Using a sterile surgical marker, an appropriate Banner transposition flap was drawn incorporating the defect.    The area thus outlined was incised deep to adipose tissue with a #15 scalpel blade.  The skin margins were undermined to an appropriate distance in all directions utilizing iris scissors.
Unique Flap 5 Text: A decision was made to reconstruct the defect utilizing a tunneled myocutaneous Island pedicle Flap based on the anterior auricularis muscle.  A telfa template was made of the defect.  This telfa template was then used to outline the myocutaneous flap, based along the preauricular fold.  The donor area for the pedicle flap was then injected with anesthesia.  The flap was excised through the skin and subcutaneous tissue down to the layer of the underlying musculature.  The myocutaneous flap was carefully excised within this deep plane to maintain its blood supply based on the muscle. The edges of the donor site were undermined.   The donor site was closed in a primary fashion to the point of transposition.  The pedicle was then transposed through a tunnel into position and sutured.  Once the flap was sutured into place, adequate blood supply was confirmed with blanching and refill.
Manual Repair Warning Statement: We plan on removing the manually selected variable below in favor of our much easier automatic structured text blocks found in the previous tab. We decided to do this to help make the flow better and give you the full power of structured data. Manual selection is never going to be ideal in our platform and I would encourage you to avoid using manual selection from this point on, especially since I will be sunsetting this feature. It is important that you do one of two things with the customized text below. First, you can save all of the text in a word file so you can have it for future reference. Second, transfer the text to the appropriate area in the Library tab. Lastly, if there is a flap or graft type which we do not have you need to let us know right away so I can add it in before the variable is hidden. No need to panic, we plan to give you roughly 6 months to make the change.
Same Histology In Subsequent Stages Text: The pattern and morphology of the tumor is as described in the first stage.
No Residual Tumor Seen Histology Text: There were no malignant cells seen in the sections examined.
Inflammation Suggestive Of Cancer Camouflage Histology Text: There was a dense lymphocytic infiltrate which prevented adequate histologic evaluation of adjacent structures.
Bcc Histology Text: There were numerous aggregates of basaloid cells.
Bcc Infiltrative Histology Text: There were numerous aggregates of basaloid cells demonstrating an infiltrative pattern.
Mart-1 - Positive Histology Text: MART-1 staining demonstrates areas of higher density and clustering of melanocytes with Pagetoid spread upwards within the epidermis. The surgical margins are positive for tumor cells.
Mart-1 - Negative Histology Text: MART-1 staining demonstrates a normal density and pattern of melanocytes along the dermal-epidermal junction. The surgical margins are negative for tumor cells.
Information: Selecting Yes will display possible errors in your note based on the variables you have selected. This validation is only offered as a suggestion for you. PLEASE NOTE THAT THE VALIDATION TEXT WILL BE REMOVED WHEN YOU FINALIZE YOUR NOTE. IF YOU WANT TO FAX A PRELIMINARY NOTE YOU WILL NEED TO TOGGLE THIS TO 'NO' IF YOU DO NOT WANT IT IN YOUR FAXED NOTE.

## 2021-02-04 ENCOUNTER — IMMUNIZATION (OUTPATIENT)
Dept: FAMILY PLANNING/WOMEN'S HEALTH CLINIC | Facility: IMMUNIZATION CENTER | Age: 81
End: 2021-02-04
Attending: INTERNAL MEDICINE
Payer: MEDICARE

## 2021-02-04 DIAGNOSIS — Z23 ENCOUNTER FOR VACCINATION: Primary | ICD-10-CM

## 2021-02-04 PROCEDURE — 0002A PFIZER SARS-COV-2 VACCINE: CPT

## 2021-02-04 PROCEDURE — 91300 PFIZER SARS-COV-2 VACCINE: CPT

## 2021-02-08 ENCOUNTER — APPOINTMENT (RX ONLY)
Dept: URBAN - METROPOLITAN AREA CLINIC 36 | Facility: CLINIC | Age: 81
Setting detail: DERMATOLOGY
End: 2021-02-08

## 2021-02-08 DIAGNOSIS — Z48.817 ENCOUNTER FOR SURGICAL AFTERCARE FOLLOWING SURGERY ON THE SKIN AND SUBCUTANEOUS TISSUE: ICD-10-CM

## 2021-02-08 PROCEDURE — 99024 POSTOP FOLLOW-UP VISIT: CPT

## 2021-02-08 PROCEDURE — ? POST-OP WOUND CHECK

## 2021-02-08 ASSESSMENT — LOCATION SIMPLE DESCRIPTION DERM: LOCATION SIMPLE: LEFT FOREARM

## 2021-02-08 ASSESSMENT — LOCATION DETAILED DESCRIPTION DERM: LOCATION DETAILED: LEFT DISTAL DORSAL FOREARM

## 2021-02-08 ASSESSMENT — LOCATION ZONE DERM: LOCATION ZONE: ARM

## 2021-02-08 NOTE — PROCEDURE: POST-OP WOUND CHECK
Detail Level: Detailed
Add 63938 Cpt? (Important Note: In 2017 The Use Of 84718 Is Being Tracked By Cms To Determine Future Global Period Reimbursement For Global Periods): yes

## 2021-02-09 ENCOUNTER — APPOINTMENT (RX ONLY)
Dept: URBAN - METROPOLITAN AREA CLINIC 20 | Facility: CLINIC | Age: 81
Setting detail: DERMATOLOGY
End: 2021-02-09

## 2021-02-09 DIAGNOSIS — L98.8 OTHER SPECIFIED DISORDERS OF THE SKIN AND SUBCUTANEOUS TISSUE: ICD-10-CM

## 2021-02-09 DIAGNOSIS — L57.0 ACTINIC KERATOSIS: ICD-10-CM

## 2021-02-09 DIAGNOSIS — L82.0 INFLAMED SEBORRHEIC KERATOSIS: ICD-10-CM

## 2021-02-09 PROCEDURE — 17000 DESTRUCT PREMALG LESION: CPT | Mod: 59

## 2021-02-09 PROCEDURE — ? ADDITIONAL NOTES

## 2021-02-09 PROCEDURE — ? COUNSELING

## 2021-02-09 PROCEDURE — 99212 OFFICE O/P EST SF 10 MIN: CPT | Mod: 25

## 2021-02-09 PROCEDURE — ? LIQUID NITROGEN

## 2021-02-09 PROCEDURE — 17003 DESTRUCT PREMALG LES 2-14: CPT | Mod: 59

## 2021-02-09 PROCEDURE — 17110 DESTRUCTION B9 LES UP TO 14: CPT

## 2021-02-09 ASSESSMENT — LOCATION ZONE DERM
LOCATION ZONE: TRUNK
LOCATION ZONE: ARM
LOCATION ZONE: LIP

## 2021-02-09 ASSESSMENT — LOCATION DETAILED DESCRIPTION DERM
LOCATION DETAILED: SUBXIPHOID
LOCATION DETAILED: LEFT INFERIOR VERMILION LIP
LOCATION DETAILED: LEFT PROXIMAL DORSAL FOREARM
LOCATION DETAILED: RIGHT INFERIOR VERMILION LIP

## 2021-02-09 ASSESSMENT — LOCATION SIMPLE DESCRIPTION DERM
LOCATION SIMPLE: LEFT FOREARM
LOCATION SIMPLE: RIGHT LIP
LOCATION SIMPLE: LEFT LIP
LOCATION SIMPLE: ABDOMEN

## 2021-02-09 NOTE — PROCEDURE: ADDITIONAL NOTES
Additional Notes: Discussed LN2 today\\nIf still present after treatment, recommended chemo cream - used imiquimod previously with exuberant reaction
Render Risk Assessment In Note?: no
Detail Level: Simple
<<-----Click here for Discharge Medication Review

## 2021-06-10 ENCOUNTER — OFFICE VISIT (OUTPATIENT)
Dept: MEDICAL GROUP | Facility: MEDICAL CENTER | Age: 81
End: 2021-06-10
Payer: MEDICARE

## 2021-06-10 ENCOUNTER — HOSPITAL ENCOUNTER (OUTPATIENT)
Facility: MEDICAL CENTER | Age: 81
End: 2021-06-10
Attending: FAMILY MEDICINE
Payer: MEDICARE

## 2021-06-10 VITALS
WEIGHT: 125 LBS | BODY MASS INDEX: 21.34 KG/M2 | HEIGHT: 64 IN | SYSTOLIC BLOOD PRESSURE: 126 MMHG | TEMPERATURE: 98.3 F | OXYGEN SATURATION: 97 % | HEART RATE: 71 BPM | DIASTOLIC BLOOD PRESSURE: 72 MMHG

## 2021-06-10 DIAGNOSIS — E55.9 VITAMIN D DEFICIENCY: ICD-10-CM

## 2021-06-10 DIAGNOSIS — N30.00 ACUTE CYSTITIS WITHOUT HEMATURIA: ICD-10-CM

## 2021-06-10 DIAGNOSIS — R53.83 OTHER FATIGUE: ICD-10-CM

## 2021-06-10 DIAGNOSIS — R63.5 WEIGHT GAIN: ICD-10-CM

## 2021-06-10 DIAGNOSIS — J30.2 SEASONAL ALLERGIES: ICD-10-CM

## 2021-06-10 DIAGNOSIS — I10 ESSENTIAL HYPERTENSION: ICD-10-CM

## 2021-06-10 LAB
APPEARANCE UR: NORMAL
BILIRUB UR STRIP-MCNC: NEGATIVE MG/DL
COLOR UR AUTO: NORMAL
GLUCOSE UR STRIP.AUTO-MCNC: NEGATIVE MG/DL
KETONES UR STRIP.AUTO-MCNC: NEGATIVE MG/DL
LEUKOCYTE ESTERASE UR QL STRIP.AUTO: NORMAL
NITRITE UR QL STRIP.AUTO: NEGATIVE
PH UR STRIP.AUTO: 6.5 [PH] (ref 5–8)
PROT UR QL STRIP: NEGATIVE MG/DL
RBC UR QL AUTO: NORMAL
SP GR UR STRIP.AUTO: 1.02
UROBILINOGEN UR STRIP-MCNC: NORMAL MG/DL

## 2021-06-10 PROCEDURE — 81002 URINALYSIS NONAUTO W/O SCOPE: CPT | Performed by: FAMILY MEDICINE

## 2021-06-10 PROCEDURE — 87186 SC STD MICRODIL/AGAR DIL: CPT

## 2021-06-10 PROCEDURE — 87086 URINE CULTURE/COLONY COUNT: CPT

## 2021-06-10 PROCEDURE — 99214 OFFICE O/P EST MOD 30 MIN: CPT | Performed by: FAMILY MEDICINE

## 2021-06-10 PROCEDURE — 87077 CULTURE AEROBIC IDENTIFY: CPT | Mod: 91

## 2021-06-10 RX ORDER — ALPRAZOLAM 0.25 MG/1
TABLET ORAL
COMMUNITY
Start: 2021-04-14

## 2021-06-10 RX ORDER — CEPHALEXIN 500 MG/1
CAPSULE ORAL
COMMUNITY
Start: 2021-05-26 | End: 2021-06-10

## 2021-06-10 RX ORDER — AZELASTINE 1 MG/ML
1 SPRAY, METERED NASAL 2 TIMES DAILY
Qty: 30 ML | Refills: 2 | Status: SHIPPED | OUTPATIENT
Start: 2021-06-10

## 2021-06-10 RX ORDER — ONDANSETRON 4 MG/1
TABLET, FILM COATED ORAL
COMMUNITY
Start: 2021-05-26 | End: 2021-06-10

## 2021-06-10 RX ORDER — CEFDINIR 300 MG/1
300 CAPSULE ORAL 2 TIMES DAILY
Qty: 14 CAPSULE | Refills: 0 | Status: SHIPPED | OUTPATIENT
Start: 2021-06-10 | End: 2021-06-17

## 2021-06-10 ASSESSMENT — PATIENT HEALTH QUESTIONNAIRE - PHQ9
SUM OF ALL RESPONSES TO PHQ QUESTIONS 1-9: 0
7. TROUBLE CONCENTRATING ON THINGS, SUCH AS READING THE NEWSPAPER OR WATCHING TELEVISION: NOT AT ALL
SUM OF ALL RESPONSES TO PHQ9 QUESTIONS 1 AND 2: 0
4. FEELING TIRED OR HAVING LITTLE ENERGY: NOT AT ALL
5. POOR APPETITE OR OVEREATING: NOT AT ALL
2. FEELING DOWN, DEPRESSED, IRRITABLE, OR HOPELESS: NOT AT ALL
9. THOUGHTS THAT YOU WOULD BE BETTER OFF DEAD, OR OF HURTING YOURSELF: NOT AT ALL
3. TROUBLE FALLING OR STAYING ASLEEP OR SLEEPING TOO MUCH: NOT AT ALL
8. MOVING OR SPEAKING SO SLOWLY THAT OTHER PEOPLE COULD HAVE NOTICED. OR THE OPPOSITE, BEING SO FIGETY OR RESTLESS THAT YOU HAVE BEEN MOVING AROUND A LOT MORE THAN USUAL: NOT AT ALL
6. FEELING BAD ABOUT YOURSELF - OR THAT YOU ARE A FAILURE OR HAVE LET YOURSELF OR YOUR FAMILY DOWN: NOT AL ALL
1. LITTLE INTEREST OR PLEASURE IN DOING THINGS: NOT AT ALL

## 2021-06-10 NOTE — ASSESSMENT & PLAN NOTE
Patient presented with back pain.   Still having difficulty starting to urinate  Has a feeling of pressure and a pulling sensation in the bladder.   Slightly painful, + burning  1+ LE, neg nitrite, trace blood.   Given Rx for keflex BID x 10 days

## 2021-06-11 LAB
AMBIGUOUS DTTM AMBI4: NORMAL
SIGNIFICANT IND 70042: NORMAL
SITE SITE: NORMAL
SOURCE SOURCE: NORMAL

## 2021-06-11 NOTE — PROGRESS NOTES
Subjective:     CC: Diagnoses of Acute cystitis without hematuria, Seasonal allergies, Weight gain, Vitamin D deficiency, Other fatigue, and Essential hypertension were pertinent to this visit.    HPI: Patient is a 80 y.o. female established patient who presents today with concern about possible UTI.  She was treated 3 weeks ago with Keflex, still having symptoms.      Acute cystitis without hematuria  Patient presented with back pain 3 weeks ago along with burning to an urgent care outside of [facility], treated with Keflex..  Unclear if there was a culture done.  Still having difficulty starting to urinate, still having some lower back pain.  Has a feeling of pressure and a pulling sensation in the bladder.   Slightly painful, + burning  UA: 1+ LE, neg nitrite, trace blood.   Given Rx for keflex BID x 10 days    Seasonal allergies  Chronic problem. Also has asthma.  Using Zyrtec.         Past Medical History:   Diagnosis Date   • Allergy, unspecified not elsewhere classified    • Anxiety    • Arthritis    • Blood transfusion, without reported diagnosis    • CATARACT    • Dental disorder     lichen planus   • Depression    • Fibromyalgia    • GERD (gastroesophageal reflux disease)    • GERD (gastroesophageal reflux disease)    • Headache(784.0)    • Headache, classical migraine    • Kidney calculi    • Lichen planus    • Lupus (HCC)    • Migraine without aura, without mention of intractable migraine without mention of status migrainosus    • Mitral valve prolapse    • Osteoporosis, unspecified    • Other convulsions     Isolated seizure March, 2014   • Pain     joints, muscles   • Psychiatric problem     depression   • Shingles 10/4/11    dr velasco aware of this back of head   • Sjogren's syndrome (HCC)    • Snoring    • Unspecified asthma(493.90)    • Urinary tract infection, site not specified        Social History     Tobacco Use   • Smoking status: Former Smoker     Years: 34.00     Quit date: 1/28/1978     Years  since quittin.3   • Smokeless tobacco: Former User     Quit date: 1978   Vaping Use   • Vaping Use: Never used   Substance Use Topics   • Alcohol use: Yes     Comment: Rarely   • Drug use: No       Current Outpatient Medications Ordered in Epic   Medication Sig Dispense Refill   • ALPRAZolam (XANAX) 0.25 MG Tab      • cefdinir (OMNICEF) 300 MG Cap Take 1 capsule by mouth 2 times a day for 7 days. 14 capsule 0   • azelastine (ASTELIN) 137 MCG/SPRAY nasal spray Administer 1 Spray into affected nostril(S) 2 times a day. 30 mL 2   • diazePAM (VALIUM) 5 MG Tab Taking 2.5 at night PRN     • triamterene/hctz (MAXZIDE-25/DYAZIDE) 37.5-25 MG Cap TAKE ONE CAPSULE BY MOUTH EVERY MORNING 30 Cap 0   • albuterol 108 (90 Base) MCG/ACT Aero Soln inhalation aerosol Inhale 2 Puffs by mouth every 6 hours as needed for Shortness of Breath. 8.5 g 2   • vitamin D, Ergocalciferol, (DRISDOL) 78199 units Cap capsule Take  by mouth every 7 days.     • lidocaine (XYLOCAINE) 5 % Ointment      • triamcinolone acetonide (KENALOG) 0.1 % Ointment      • ALPRAZolam (XANAX) 0.5 MG Tab Take 0.5 mg by mouth at bedtime as needed for Sleep.     • Fluvoxamine Maleate 100 MG CAPSULE SR 24 HR Take 100 mg by mouth every bedtime.     • Turmeric, Curcuma Longa, (CURCUMIN) Powder by Does not apply route.     • GLUCOSAMINE-CHONDROITIN PO Take  by mouth.     • Hyoscyamine Sulfate (LEVSIN PO) Take  by mouth as needed.     • BIOTIN 5000 PO Take  by mouth every day.     • Multiple Vitamin (MULTIVITAMIN PO) Take  by mouth every day.     • naproxen (NAPROSYN) 375 MG TABS Take 375 mg by mouth 2 times a day, with meals.       No current Epic-ordered facility-administered medications on file.       Allergies:  Ciprofloxacin, Other drug, and Sulfa drugs    Health Maintenance: Completed    ROS:  Gen: no fevers/chill, no changes in weight  Eyes: no changes in vision  ENT: no sore throat, no hearing loss, no bloody nose  Pulm: no sob, no cough  CV: no chest pain,  "no palpitations  GI: no nausea/vomiting, no diarrhea  : no dysuria  MSk: no myalgias  Skin: no rash  Neuro: no headaches, no numbness/tingling  Heme/Lymph: no easy bruising      Objective:       Exam:  /72 (BP Location: Right arm, Patient Position: Sitting, BP Cuff Size: Adult long)   Pulse 71   Temp 36.8 °C (98.3 °F) (Temporal)   Ht 1.626 m (5' 4\")   Wt 56.7 kg (125 lb)   SpO2 97%   BMI 21.46 kg/m²  Body mass index is 21.46 kg/m².    General: Normal appearing. No distress.  HEAD: NCAT  EYES: conjunctiva clear, lids without ptosis, pupils equal and reactive to light  EARS: ears normal shape and contour.  MOUTH: normal dentition   Neck:  Normal ROM  Pulmonary: Clear to auscultation bilaterally.  Normal effort. Normal respiratory rate.  Cardiovascular: Rate and rhythm.  Well perfused. No LE edema  Neurologic: Grossly normal, no focal deficits  Skin: Warm and dry.  No obvious lesions.  Musculoskeletal: Normal gait and station. + CVA tenderness  Psych: Normal mood and affect. Alert and oriented x3. Judgment and insight is normal.    Labs: 12/17/19 Results reviewed and discussed with the patient, questions answered.    Assessment & Plan:     80 y.o. female with the following -     1. Acute cystitis without hematuria  New problem.  Patient was treated for UTI 3 weeks ago with Keflex x10 days.  Symptoms never fully resolved.  Urine culture collected, urinalysis concerning for persistent UTI with 1+ leukocyte esterase and trace blood.  Plan to treat with cefdinir.  Follow-up if needed.  - cefdinir (OMNICEF) 300 MG Cap; Take 1 capsule by mouth 2 times a day for 7 days.  Dispense: 14 capsule; Refill: 0  - POCT Urinalysis  - URINE CULTURE(NEW); Future    2. Seasonal allergies  Chronic problem, prescription given for Astelin, already using Flonase and Zyrtec.  Plan to follow-up in 1 month to see how she is doing.  - azelastine (ASTELIN) 137 MCG/SPRAY nasal spray; Administer 1 Spray into affected nostril(S) 2 times " a day.  Dispense: 30 mL; Refill: 2    3. Weight gain  - TSH WITH REFLEX TO FT4; Future    4. Vitamin D deficiency  - VITAMIN D,25 HYDROXY; Future    5. Other fatigue  - Comp Metabolic Panel; Future  - CBC WITH DIFFERENTIAL; Future  - VITAMIN B12; Future    6. Essential hypertension  - Comp Metabolic Panel; Future  - Lipid Profile; Future    Plan to follow-up in a month for general follow-up, lab follow-up and follow-up on allergies.    Return in about 4 weeks (around 7/8/2021).    Please note that this dictation was created using voice recognition software. I have made every reasonable attempt to correct obvious errors, but I expect that there are errors of grammar and possibly content that I did not discover before finalizing the note.

## 2021-06-12 ENCOUNTER — HOSPITAL ENCOUNTER (OUTPATIENT)
Dept: LAB | Facility: MEDICAL CENTER | Age: 81
End: 2021-06-12
Attending: INTERNAL MEDICINE
Payer: MEDICARE

## 2021-06-12 ENCOUNTER — HOSPITAL ENCOUNTER (OUTPATIENT)
Dept: LAB | Facility: MEDICAL CENTER | Age: 81
End: 2021-06-12
Attending: FAMILY MEDICINE
Payer: MEDICARE

## 2021-06-12 DIAGNOSIS — R53.83 OTHER FATIGUE: ICD-10-CM

## 2021-06-12 DIAGNOSIS — E55.9 VITAMIN D DEFICIENCY: ICD-10-CM

## 2021-06-12 DIAGNOSIS — I10 ESSENTIAL HYPERTENSION: ICD-10-CM

## 2021-06-12 DIAGNOSIS — R63.5 WEIGHT GAIN: ICD-10-CM

## 2021-06-12 LAB
25(OH)D3 SERPL-MCNC: 33 NG/ML (ref 30–100)
ALBUMIN SERPL BCP-MCNC: 4.1 G/DL (ref 3.2–4.9)
ALBUMIN/GLOB SERPL: 1.3 G/DL
ALP SERPL-CCNC: 71 U/L (ref 30–99)
ALT SERPL-CCNC: 10 U/L (ref 2–50)
ANION GAP SERPL CALC-SCNC: 10 MMOL/L (ref 7–16)
AST SERPL-CCNC: 22 U/L (ref 12–45)
BASOPHILS # BLD AUTO: 1.2 % (ref 0–1.8)
BASOPHILS # BLD: 0.05 K/UL (ref 0–0.12)
BILIRUB SERPL-MCNC: 0.4 MG/DL (ref 0.1–1.5)
BUN SERPL-MCNC: 19 MG/DL (ref 8–22)
CALCIUM SERPL-MCNC: 9.3 MG/DL (ref 8.5–10.5)
CHLORIDE SERPL-SCNC: 103 MMOL/L (ref 96–112)
CHOLEST SERPL-MCNC: 229 MG/DL (ref 100–199)
CO2 SERPL-SCNC: 26 MMOL/L (ref 20–33)
CREAT SERPL-MCNC: 0.94 MG/DL (ref 0.5–1.4)
EOSINOPHIL # BLD AUTO: 0.13 K/UL (ref 0–0.51)
EOSINOPHIL NFR BLD: 3.2 % (ref 0–6.9)
ERYTHROCYTE [DISTWIDTH] IN BLOOD BY AUTOMATED COUNT: 44.1 FL (ref 35.9–50)
FASTING STATUS PATIENT QL REPORTED: NORMAL
GLOBULIN SER CALC-MCNC: 3.1 G/DL (ref 1.9–3.5)
GLUCOSE SERPL-MCNC: 75 MG/DL (ref 65–99)
HCT VFR BLD AUTO: 44.8 % (ref 37–47)
HDLC SERPL-MCNC: 63 MG/DL
HGB BLD-MCNC: 14.3 G/DL (ref 12–16)
IMM GRANULOCYTES # BLD AUTO: 0.01 K/UL (ref 0–0.11)
IMM GRANULOCYTES NFR BLD AUTO: 0.2 % (ref 0–0.9)
LDLC SERPL CALC-MCNC: 150 MG/DL
LYMPHOCYTES # BLD AUTO: 1.03 K/UL (ref 1–4.8)
LYMPHOCYTES NFR BLD: 25.2 % (ref 22–41)
MCH RBC QN AUTO: 31.5 PG (ref 27–33)
MCHC RBC AUTO-ENTMCNC: 31.9 G/DL (ref 33.6–35)
MCV RBC AUTO: 98.7 FL (ref 81.4–97.8)
MONOCYTES # BLD AUTO: 0.29 K/UL (ref 0–0.85)
MONOCYTES NFR BLD AUTO: 7.1 % (ref 0–13.4)
NEUTROPHILS # BLD AUTO: 2.57 K/UL (ref 2–7.15)
NEUTROPHILS NFR BLD: 63.1 % (ref 44–72)
NRBC # BLD AUTO: 0 K/UL
NRBC BLD-RTO: 0 /100 WBC
PLATELET # BLD AUTO: 293 K/UL (ref 164–446)
PMV BLD AUTO: 9.7 FL (ref 9–12.9)
POTASSIUM SERPL-SCNC: 4.3 MMOL/L (ref 3.6–5.5)
PROT SERPL-MCNC: 7.2 G/DL (ref 6–8.2)
RBC # BLD AUTO: 4.54 M/UL (ref 4.2–5.4)
SODIUM SERPL-SCNC: 139 MMOL/L (ref 135–145)
TRIGL SERPL-MCNC: 78 MG/DL (ref 0–149)
TSH SERPL DL<=0.005 MIU/L-ACNC: 2.69 UIU/ML (ref 0.38–5.33)
VIT B12 SERPL-MCNC: 617 PG/ML (ref 211–911)
WBC # BLD AUTO: 4.1 K/UL (ref 4.8–10.8)

## 2021-06-12 PROCEDURE — 36415 COLL VENOUS BLD VENIPUNCTURE: CPT

## 2021-06-12 PROCEDURE — 80061 LIPID PANEL: CPT

## 2021-06-12 PROCEDURE — 80053 COMPREHEN METABOLIC PANEL: CPT | Mod: 91

## 2021-06-12 PROCEDURE — 84436 ASSAY OF TOTAL THYROXINE: CPT

## 2021-06-12 PROCEDURE — 84479 ASSAY OF THYROID (T3 OR T4): CPT

## 2021-06-12 PROCEDURE — 85025 COMPLETE CBC W/AUTO DIFF WBC: CPT

## 2021-06-12 PROCEDURE — 82607 VITAMIN B-12: CPT

## 2021-06-12 PROCEDURE — 80053 COMPREHEN METABOLIC PANEL: CPT

## 2021-06-12 PROCEDURE — 82306 VITAMIN D 25 HYDROXY: CPT

## 2021-06-12 PROCEDURE — 80061 LIPID PANEL: CPT | Mod: 91

## 2021-06-12 PROCEDURE — 85025 COMPLETE CBC W/AUTO DIFF WBC: CPT | Mod: 91

## 2021-06-12 PROCEDURE — 84443 ASSAY THYROID STIM HORMONE: CPT

## 2021-06-13 LAB
BACTERIA UR CULT: ABNORMAL
BACTERIA UR CULT: ABNORMAL
SIGNIFICANT IND 70042: ABNORMAL
SITE SITE: ABNORMAL
SOURCE SOURCE: ABNORMAL

## 2021-06-15 LAB
BASOPHILS # BLD AUTO: 1.5 % (ref 0–1.8)
BASOPHILS # BLD: 0.06 K/UL (ref 0–0.12)
EOSINOPHIL # BLD AUTO: 0.1 K/UL (ref 0–0.51)
EOSINOPHIL NFR BLD: 2.5 % (ref 0–6.9)
ERYTHROCYTE [DISTWIDTH] IN BLOOD BY AUTOMATED COUNT: 45 FL (ref 35.9–50)
HCT VFR BLD AUTO: 46.2 % (ref 37–47)
HGB BLD-MCNC: 14.3 G/DL (ref 12–16)
IMM GRANULOCYTES # BLD AUTO: 0.01 K/UL (ref 0–0.11)
IMM GRANULOCYTES NFR BLD AUTO: 0.2 % (ref 0–0.9)
LYMPHOCYTES # BLD AUTO: 0.97 K/UL (ref 1–4.8)
LYMPHOCYTES NFR BLD: 24.1 % (ref 22–41)
MCH RBC QN AUTO: 31.3 PG (ref 27–33)
MCHC RBC AUTO-ENTMCNC: 31 G/DL (ref 33.6–35)
MCV RBC AUTO: 101.1 FL (ref 81.4–97.8)
MONOCYTES # BLD AUTO: 0.26 K/UL (ref 0–0.85)
MONOCYTES NFR BLD AUTO: 6.5 % (ref 0–13.4)
NEUTROPHILS # BLD AUTO: 2.62 K/UL (ref 2–7.15)
NEUTROPHILS NFR BLD: 65.2 % (ref 44–72)
NRBC # BLD AUTO: 0 K/UL
NRBC BLD-RTO: 0 /100 WBC
PLATELET # BLD AUTO: 274 K/UL (ref 164–446)
PMV BLD AUTO: 10.4 FL (ref 9–12.9)
RBC # BLD AUTO: 4.57 M/UL (ref 4.2–5.4)
WBC # BLD AUTO: 4 K/UL (ref 4.8–10.8)

## 2021-06-16 ENCOUNTER — TELEPHONE (OUTPATIENT)
Dept: MEDICAL GROUP | Facility: MEDICAL CENTER | Age: 81
End: 2021-06-16

## 2021-06-16 LAB
ALBUMIN SERPL BCP-MCNC: 4.2 G/DL (ref 3.2–4.9)
ALBUMIN/GLOB SERPL: 1.4 G/DL
ALP SERPL-CCNC: 71 U/L (ref 30–99)
ALT SERPL-CCNC: 15 U/L (ref 2–50)
ANION GAP SERPL CALC-SCNC: 18 MMOL/L (ref 7–16)
AST SERPL-CCNC: 29 U/L (ref 12–45)
BILIRUB SERPL-MCNC: 0.4 MG/DL (ref 0.1–1.5)
BUN SERPL-MCNC: 19 MG/DL (ref 8–22)
CALCIUM SERPL-MCNC: 9.8 MG/DL (ref 8.5–10.5)
CHLORIDE SERPL-SCNC: 103 MMOL/L (ref 96–112)
CHOLEST SERPL-MCNC: 235 MG/DL (ref 100–199)
CO2 SERPL-SCNC: 18 MMOL/L (ref 20–33)
CREAT SERPL-MCNC: 0.92 MG/DL (ref 0.5–1.4)
FASTING STATUS PATIENT QL REPORTED: NORMAL
GLOBULIN SER CALC-MCNC: 3 G/DL (ref 1.9–3.5)
GLUCOSE SERPL-MCNC: 40 MG/DL (ref 65–99)
HDLC SERPL-MCNC: 61 MG/DL
LDLC SERPL CALC-MCNC: 158 MG/DL
POTASSIUM SERPL-SCNC: 4.7 MMOL/L (ref 3.6–5.5)
PROT SERPL-MCNC: 7.2 G/DL (ref 6–8.2)
SODIUM SERPL-SCNC: 139 MMOL/L (ref 135–145)
TRIGL SERPL-MCNC: 82 MG/DL (ref 0–149)

## 2021-06-16 NOTE — TELEPHONE ENCOUNTER
The lab called to report a critical result.  BS; 40  I called pt and asked if she is having any symptoms, pt stated that she's been feeling a little dizzy and with low energy. I asked pt to have a sand wish, peanuts and a fruit drink for now and I'll ask one of our other providers here for direction since Dr. Goddard is out today. Pt is not alone.  Dr. Goddard and or Dr. Aquino, please advise..

## 2021-06-16 NOTE — TELEPHONE ENCOUNTER
Left pt a mess informing of Dr. Aquino's recommendations, I scheduled pt for tomorrow at 8am with Dr. Goddard. I also sent pt a mess via my chart.

## 2021-06-17 ENCOUNTER — OFFICE VISIT (OUTPATIENT)
Dept: MEDICAL GROUP | Facility: MEDICAL CENTER | Age: 81
End: 2021-06-17
Payer: MEDICARE

## 2021-06-17 ENCOUNTER — HOSPITAL ENCOUNTER (OUTPATIENT)
Facility: MEDICAL CENTER | Age: 81
End: 2021-06-17
Attending: FAMILY MEDICINE
Payer: MEDICARE

## 2021-06-17 VITALS
WEIGHT: 119 LBS | TEMPERATURE: 98.2 F | SYSTOLIC BLOOD PRESSURE: 118 MMHG | OXYGEN SATURATION: 97 % | HEIGHT: 64 IN | HEART RATE: 72 BPM | DIASTOLIC BLOOD PRESSURE: 68 MMHG | BODY MASS INDEX: 20.32 KG/M2

## 2021-06-17 DIAGNOSIS — N30.00 ACUTE CYSTITIS WITHOUT HEMATURIA: ICD-10-CM

## 2021-06-17 LAB
APPEARANCE UR: CLEAR
BILIRUB UR STRIP-MCNC: NEGATIVE MG/DL
COLOR UR AUTO: YELLOW
GLUCOSE UR STRIP.AUTO-MCNC: NEGATIVE MG/DL
KETONES UR STRIP.AUTO-MCNC: NEGATIVE MG/DL
LEUKOCYTE ESTERASE UR QL STRIP.AUTO: NEGATIVE
NITRITE UR QL STRIP.AUTO: NEGATIVE
PH UR STRIP.AUTO: 5.5 [PH] (ref 5–8)
PROT UR QL STRIP: NEGATIVE MG/DL
RBC UR QL AUTO: NEGATIVE
SP GR UR STRIP.AUTO: 1.02
UROBILINOGEN UR STRIP-MCNC: NORMAL MG/DL

## 2021-06-17 PROCEDURE — 81002 URINALYSIS NONAUTO W/O SCOPE: CPT | Performed by: FAMILY MEDICINE

## 2021-06-17 PROCEDURE — 87086 URINE CULTURE/COLONY COUNT: CPT

## 2021-06-17 PROCEDURE — 99213 OFFICE O/P EST LOW 20 MIN: CPT | Performed by: FAMILY MEDICINE

## 2021-06-17 ASSESSMENT — FIBROSIS 4 INDEX: FIB4 SCORE: 1.88

## 2021-06-17 NOTE — ASSESSMENT & PLAN NOTE
Continues to have some difficulty with hesitancy  No pain with urination  Still with some slight back pain but that is mostly resovle.d   Still having some slight pressure in the bladder.

## 2021-06-18 NOTE — PROGRESS NOTES
Subjective:     CC: The encounter diagnosis was Acute cystitis without hematuria.    HPI: Patient is a 80 y.o. female established patient who presents today with concerns regarding labs and possible persistent UTI.    Hypoglycemia  Patient had blood work done yesterday.  It appears that there was a lab error which showed her blood sugar to be 40.  She had no associated symptoms.  The blood was really run and her blood sugar was actually around 75.  She does have some dizziness and fatigue intermittently at baseline which her cardiologist feels is due to PVCs.      Acute cystitis without hematuria  Continues to have some difficulty with hesitancy  No pain with urination  Still with some slight back pain but that is mostly resovle.d   Still having some slight pressure in the bladder.       Past Medical History:   Diagnosis Date   • Allergy, unspecified not elsewhere classified    • Anxiety    • Arthritis    • Blood transfusion, without reported diagnosis    • CATARACT    • Dental disorder     lichen planus   • Depression    • Fibromyalgia    • GERD (gastroesophageal reflux disease)    • GERD (gastroesophageal reflux disease)    • Headache(784.0)    • Headache, classical migraine    • Kidney calculi    • Lichen planus    • Lupus (HCC)    • Migraine without aura, without mention of intractable migraine without mention of status migrainosus    • Mitral valve prolapse    • Osteoporosis, unspecified    • Other convulsions     Isolated seizure    • Pain     joints, muscles   • Psychiatric problem     depression   • Shingles 10/4/11    dr velasco aware of this back of head   • Sjogren's syndrome (HCC)    • Snoring    • Unspecified asthma(493.90)    • Urinary tract infection, site not specified        Social History     Tobacco Use   • Smoking status: Former Smoker     Years: 34.00     Quit date: 1978     Years since quittin.4   • Smokeless tobacco: Former User     Quit date: 1978   Vaping Use   •  "Vaping Use: Never used   Substance Use Topics   • Alcohol use: Yes     Comment: Rarely   • Drug use: No       Current Outpatient Medications Ordered in Epic   Medication Sig Dispense Refill   • ALPRAZolam (XANAX) 0.25 MG Tab      • azelastine (ASTELIN) 137 MCG/SPRAY nasal spray Administer 1 Spray into affected nostril(S) 2 times a day. 30 mL 2   • diazePAM (VALIUM) 5 MG Tab Taking 2.5 at night PRN     • triamterene/hctz (MAXZIDE-25/DYAZIDE) 37.5-25 MG Cap TAKE ONE CAPSULE BY MOUTH EVERY MORNING 30 Cap 0   • albuterol 108 (90 Base) MCG/ACT Aero Soln inhalation aerosol Inhale 2 Puffs by mouth every 6 hours as needed for Shortness of Breath. 8.5 g 2   • vitamin D, Ergocalciferol, (DRISDOL) 60080 units Cap capsule Take  by mouth every 7 days.     • lidocaine (XYLOCAINE) 5 % Ointment      • triamcinolone acetonide (KENALOG) 0.1 % Ointment      • ALPRAZolam (XANAX) 0.5 MG Tab Take 0.5 mg by mouth at bedtime as needed for Sleep.     • Fluvoxamine Maleate 100 MG CAPSULE SR 24 HR Take 100 mg by mouth every bedtime.     • Turmeric, Curcuma Longa, (CURCUMIN) Powder by Does not apply route.     • GLUCOSAMINE-CHONDROITIN PO Take  by mouth.     • Hyoscyamine Sulfate (LEVSIN PO) Take  by mouth as needed.     • BIOTIN 5000 PO Take  by mouth every day.     • Multiple Vitamin (MULTIVITAMIN PO) Take  by mouth every day.     • naproxen (NAPROSYN) 375 MG TABS Take 375 mg by mouth 2 times a day, with meals.       No current Epic-ordered facility-administered medications on file.       Allergies:  Ciprofloxacin, Other drug, and Sulfa drugs    Health Maintenance: Completed    ROS:  Pulm: no sob, no cough  CV: no chest pain, no palpitations      Objective:       Exam:  /68 (BP Location: Right arm, Patient Position: Sitting, BP Cuff Size: Adult long)   Pulse 72   Temp 36.8 °C (98.2 °F) (Temporal)   Ht 1.626 m (5' 4\")   Wt 54 kg (119 lb)   SpO2 97%   BMI 20.43 kg/m²  Body mass index is 20.43 kg/m².    General: Normal appearing. No " distress.  HEAD: NCAT  EYES: conjunctiva clear, lids without ptosis, pupils equal and reactive to light  EARS: ears normal shape and contour.  MOUTH: normal dentition   Neck:  Normal ROM  Pulmonary: Normal effort. Normal respiratory rate.  Cardiovascular: Well perfused. No LE edema  Neurologic: Grossly normal, no focal deficits  Skin: Warm and dry.  No obvious lesions.  Musculoskeletal: Normal gait and station.   Psych: Normal mood and affect. Alert and oriented x3. Judgment and insight is normal.     Labs: 6/12/21 Results reviewed and discussed with the patient, questions answered.    Assessment & Plan:     80 y.o. female with the following -     1. Acute cystitis without hematuria  Chronic problem.  Urinalysis was completely clear today.  Advised that her symptoms are likely not from infection.  Reviewed that urine culture was negative at last visit.  She is now status post full round of Keflex as well as cefdinir.  Advised symptoms should continue to improve.  If symptoms do not resolve recommended she please follow-up.  - POCT Urinalysis  - URINE CULTURE(NEW); Future    2.  Hypoglycemia  New problem.  Advised patient that this was lab error.  When they reran the blood her blood sugar was 75.  Virus is unlikely causing her symptoms of dizziness or fatigue.  More likely be due to her PVCs as per cardiology.  Reassurance provided.    No follow-ups on file.    Please note that this dictation was created using voice recognition software. I have made every reasonable attempt to correct obvious errors, but I expect that there are errors of grammar and possibly content that I did not discover before finalizing the note.

## 2021-06-19 LAB
BACTERIA UR CULT: NORMAL
SIGNIFICANT IND 70042: NORMAL
SITE SITE: NORMAL
SOURCE SOURCE: NORMAL

## 2021-06-22 LAB
FT4I SERPL CALC-MCNC: 2.7 UNITS (ref 1.7–4.2)
T3RU NFR SERPL: NORMAL %
T4 SERPL-MCNC: 7.59 UG/DL (ref 5.1–14.1)

## 2021-08-26 ENCOUNTER — APPOINTMENT (RX ONLY)
Dept: URBAN - METROPOLITAN AREA CLINIC 20 | Facility: CLINIC | Age: 81
Setting detail: DERMATOLOGY
End: 2021-08-26

## 2021-08-26 DIAGNOSIS — D22 MELANOCYTIC NEVI: ICD-10-CM

## 2021-08-26 DIAGNOSIS — Z71.89 OTHER SPECIFIED COUNSELING: ICD-10-CM

## 2021-08-26 DIAGNOSIS — L82.0 INFLAMED SEBORRHEIC KERATOSIS: ICD-10-CM

## 2021-08-26 DIAGNOSIS — D18.0 HEMANGIOMA: ICD-10-CM

## 2021-08-26 DIAGNOSIS — L81.4 OTHER MELANIN HYPERPIGMENTATION: ICD-10-CM

## 2021-08-26 DIAGNOSIS — L82.1 OTHER SEBORRHEIC KERATOSIS: ICD-10-CM

## 2021-08-26 DIAGNOSIS — Z85.828 PERSONAL HISTORY OF OTHER MALIGNANT NEOPLASM OF SKIN: ICD-10-CM

## 2021-08-26 PROBLEM — D22.72 MELANOCYTIC NEVI OF LEFT LOWER LIMB, INCLUDING HIP: Status: ACTIVE | Noted: 2021-08-26

## 2021-08-26 PROBLEM — D18.01 HEMANGIOMA OF SKIN AND SUBCUTANEOUS TISSUE: Status: ACTIVE | Noted: 2021-08-26

## 2021-08-26 PROBLEM — D22.71 MELANOCYTIC NEVI OF RIGHT LOWER LIMB, INCLUDING HIP: Status: ACTIVE | Noted: 2021-08-26

## 2021-08-26 PROBLEM — D37.01 NEOPLASM OF UNCERTAIN BEHAVIOR OF LIP: Status: ACTIVE | Noted: 2021-08-26

## 2021-08-26 PROBLEM — D22.62 MELANOCYTIC NEVI OF LEFT UPPER LIMB, INCLUDING SHOULDER: Status: ACTIVE | Noted: 2021-08-26

## 2021-08-26 PROBLEM — D22.61 MELANOCYTIC NEVI OF RIGHT UPPER LIMB, INCLUDING SHOULDER: Status: ACTIVE | Noted: 2021-08-26

## 2021-08-26 PROBLEM — D22.5 MELANOCYTIC NEVI OF TRUNK: Status: ACTIVE | Noted: 2021-08-26

## 2021-08-26 PROCEDURE — ? LIQUID NITROGEN

## 2021-08-26 PROCEDURE — 17110 DESTRUCTION B9 LES UP TO 14: CPT

## 2021-08-26 PROCEDURE — 99213 OFFICE O/P EST LOW 20 MIN: CPT | Mod: 25

## 2021-08-26 PROCEDURE — ? PRESCRIPTION

## 2021-08-26 PROCEDURE — ? COUNSELING

## 2021-08-26 PROCEDURE — ? ADDITIONAL NOTES

## 2021-08-26 RX ORDER — AMOXICILLIN 500 MG/1
CAPSULE ORAL
Qty: 4 | Refills: 0 | Status: ERX | COMMUNITY
Start: 2021-08-26 | End: 2024-05-16

## 2021-08-26 RX ADMIN — AMOXICILLIN: 500 CAPSULE ORAL at 00:00

## 2021-08-26 ASSESSMENT — LOCATION SIMPLE DESCRIPTION DERM
LOCATION SIMPLE: CHEST
LOCATION SIMPLE: LEFT THIGH
LOCATION SIMPLE: RIGHT UPPER BACK
LOCATION SIMPLE: LEFT CHEEK
LOCATION SIMPLE: LEFT LOWER BACK
LOCATION SIMPLE: LEFT PRETIBIAL REGION
LOCATION SIMPLE: LEFT POSTERIOR THIGH
LOCATION SIMPLE: RIGHT TEMPLE
LOCATION SIMPLE: LEFT UPPER BACK
LOCATION SIMPLE: LEFT FOREARM
LOCATION SIMPLE: LEFT UPPER ARM
LOCATION SIMPLE: RIGHT FOREARM
LOCATION SIMPLE: RIGHT PRETIBIAL REGION
LOCATION SIMPLE: ABDOMEN
LOCATION SIMPLE: RIGHT POSTERIOR THIGH
LOCATION SIMPLE: RIGHT THIGH
LOCATION SIMPLE: RIGHT UPPER ARM
LOCATION SIMPLE: LEFT FOREHEAD

## 2021-08-26 ASSESSMENT — LOCATION ZONE DERM
LOCATION ZONE: FACE
LOCATION ZONE: TRUNK
LOCATION ZONE: ARM
LOCATION ZONE: LEG

## 2021-08-26 ASSESSMENT — LOCATION DETAILED DESCRIPTION DERM
LOCATION DETAILED: RIGHT ANTERIOR DISTAL UPPER ARM
LOCATION DETAILED: LEFT SUPERIOR MEDIAL MIDBACK
LOCATION DETAILED: LEFT DISTAL DORSAL FOREARM
LOCATION DETAILED: LEFT PROXIMAL DORSAL FOREARM
LOCATION DETAILED: LEFT ANTERIOR DISTAL UPPER ARM
LOCATION DETAILED: LEFT ANTERIOR DISTAL THIGH
LOCATION DETAILED: RIGHT VENTRAL DISTAL FOREARM
LOCATION DETAILED: LEFT MEDIAL SUPERIOR CHEST
LOCATION DETAILED: LEFT VENTRAL PROXIMAL FOREARM
LOCATION DETAILED: RIGHT ANTERIOR DISTAL THIGH
LOCATION DETAILED: LEFT INFERIOR CENTRAL MALAR CHEEK
LOCATION DETAILED: RIGHT PROXIMAL DORSAL FOREARM
LOCATION DETAILED: RIGHT VENTRAL PROXIMAL FOREARM
LOCATION DETAILED: RIGHT PROXIMAL POSTERIOR THIGH
LOCATION DETAILED: LEFT INFERIOR FOREHEAD
LOCATION DETAILED: EPIGASTRIC SKIN
LOCATION DETAILED: LEFT VENTRAL DISTAL FOREARM
LOCATION DETAILED: RIGHT INFERIOR MEDIAL UPPER BACK
LOCATION DETAILED: LEFT MID-UPPER BACK
LOCATION DETAILED: LEFT FOREHEAD
LOCATION DETAILED: LEFT PROXIMAL POSTERIOR THIGH
LOCATION DETAILED: RIGHT CENTRAL TEMPLE
LOCATION DETAILED: LEFT PROXIMAL PRETIBIAL REGION
LOCATION DETAILED: LEFT DISTAL POSTERIOR THIGH
LOCATION DETAILED: RIGHT PROXIMAL PRETIBIAL REGION
LOCATION DETAILED: LEFT DISTAL PRETIBIAL REGION
LOCATION DETAILED: RIGHT DISTAL POSTERIOR THIGH

## 2021-08-26 NOTE — PROCEDURE: ADDITIONAL NOTES
Render Risk Assessment In Note?: no
Additional Notes: Persistent lesion despite LN2\\nPt takes abx prior to dental procedures due to mitral valve prolapse. Usually takes amoxicillin. Will rx and schedule appt in coming weeks for biopsy.
Detail Level: Simple

## 2021-08-26 NOTE — PROCEDURE: LIQUID NITROGEN
Medical Necessity Clause: This procedure was medically necessary because the lesions that were treated were:
Consent: The patient's consent was obtained including but not limited to risks of crusting, scabbing, blistering, scarring, darker or lighter pigmentary change, recurrence, incomplete removal and infection.
Detail Level: Detailed
Include Z78.9 (Other Specified Conditions Influencing Health Status) As An Associated Diagnosis?: No
Medical Necessity Information: It is in your best interest to select a reason for this procedure from the list below. All of these items fulfill various CMS LCD requirements except the new and changing color options.
Show Applicator Variable?: Yes
Post-Care Instructions: I reviewed with the patient in detail post-care instructions. Patient is to wear sunprotection, and avoid picking at any of the treated lesions. Pt may apply Vaseline to crusted or scabbing areas.

## 2021-12-02 ENCOUNTER — APPOINTMENT (RX ONLY)
Dept: URBAN - METROPOLITAN AREA CLINIC 20 | Facility: CLINIC | Age: 81
Setting detail: DERMATOLOGY
End: 2021-12-02

## 2021-12-02 DIAGNOSIS — L57.0 ACTINIC KERATOSIS: ICD-10-CM

## 2021-12-02 DIAGNOSIS — L82.0 INFLAMED SEBORRHEIC KERATOSIS: ICD-10-CM

## 2021-12-02 PROBLEM — D37.01 NEOPLASM OF UNCERTAIN BEHAVIOR OF LIP: Status: ACTIVE | Noted: 2021-12-02

## 2021-12-02 PROCEDURE — ? PRESCRIPTION

## 2021-12-02 PROCEDURE — ? BIOPSY BY SHAVE METHOD

## 2021-12-02 PROCEDURE — ? ADDITIONAL NOTES

## 2021-12-02 PROCEDURE — 17110 DESTRUCTION B9 LES UP TO 14: CPT

## 2021-12-02 PROCEDURE — 40490 BIOPSY OF LIP: CPT | Mod: 59

## 2021-12-02 PROCEDURE — ? COUNSELING

## 2021-12-02 PROCEDURE — 17000 DESTRUCT PREMALG LESION: CPT | Mod: 59

## 2021-12-02 PROCEDURE — ? LIQUID NITROGEN

## 2021-12-02 RX ORDER — ACYCLOVIR 400 MG/1
TABLET ORAL
Qty: 15 | Refills: 0 | Status: ERX | COMMUNITY
Start: 2021-12-02

## 2021-12-02 RX ADMIN — ACYCLOVIR: 400 TABLET ORAL at 00:00

## 2021-12-02 ASSESSMENT — LOCATION DETAILED DESCRIPTION DERM
LOCATION DETAILED: LEFT INFERIOR VERMILION LIP
LOCATION DETAILED: LEFT AXILLARY TAIL OF BREAST

## 2021-12-02 ASSESSMENT — LOCATION SIMPLE DESCRIPTION DERM
LOCATION SIMPLE: LEFT BREAST
LOCATION SIMPLE: LEFT LIP

## 2021-12-02 ASSESSMENT — LOCATION ZONE DERM
LOCATION ZONE: LIP
LOCATION ZONE: TRUNK

## 2021-12-02 NOTE — PROCEDURE: ADDITIONAL NOTES
Additional Notes: Patient was given 2 grams of amoxicillin at 11:25am for lip biopsy.\\nGiven by Rajwinder Vasquez R.N\\nHx of mitral valve prolapse and premedicates prior to dental procedures
Detail Level: Simple
Render Risk Assessment In Note?: no

## 2021-12-02 NOTE — PROCEDURE: LIQUID NITROGEN
Render Note In Bullet Format When Appropriate: No
Post-Care Instructions: I reviewed with the patient in detail post-care instructions. Patient is to wear sunprotection, and avoid picking at any of the treated lesions. Pt may apply Vaseline to crusted or scabbing areas.
Show Topical Anesthesia Variable?: Yes
Medical Necessity Clause: This procedure was medically necessary because the lesions that were treated were:
Medical Necessity Information: It is in your best interest to select a reason for this procedure from the list below. All of these items fulfill various CMS LCD requirements except the new and changing color options.
Detail Level: Detailed
Consent: The patient's consent was obtained including but not limited to risks of crusting, scabbing, blistering, scarring, darker or lighter pigmentary change, recurrence, incomplete removal and infection.
Duration Of Freeze Thaw-Cycle (Seconds): 0

## 2021-12-02 NOTE — PROCEDURE: BIOPSY BY SHAVE METHOD
Detail Level: Detailed
Depth Of Biopsy: dermis
Was A Bandage Applied: Yes
Size Of Lesion In Cm: 0.4
X Size Of Lesion In Cm: 0.3
Biopsy Type: H and E
Biopsy Method: Dermablade
Anesthesia Type: 1% lidocaine with epinephrine
Anesthesia Volume In Cc: 0.5
Additional Anesthesia Volume In Cc (Will Not Render If 0): 0
Hemostasis: Drysol
Wound Care: Petrolatum
Dressing: bandage
Destruction After The Procedure: No
Type Of Destruction Used: Electrodesiccation and Curettage
Curettage Text: The wound bed was treated with curettage after the biopsy was performed.
Cryotherapy Text: The wound bed was treated with cryotherapy after the biopsy was performed.
Electrodesiccation Text: The wound bed was treated with electrodesiccation after the biopsy was performed.
Electrodesiccation And Curettage Text: The wound bed was treated with electrodesiccation and curettage after the biopsy was performed. Treated x 3
Silver Nitrate Text: The wound bed was treated with silver nitrate after the biopsy was performed.
Lab: [id number]
Lab Facility: 
Consent: Written consent was obtained and risks were reviewed including but not limited to scarring, infection, bleeding, scabbing, incomplete removal, nerve damage and allergy to anesthesia.
Post-Care Instructions: I reviewed with the patient in detail post-care instructions. Patient is to keep the biopsy site dry overnight, and then apply bacitracin twice daily until healed. Patient may apply hydrogen peroxide soaks to remove any crusting.
Notification Instructions: Patient will be notified of biopsy results. However, patient instructed to call the office if not contacted within 2 weeks.
Billing Type: Third-Party Bill
Information: Selecting Yes will display possible errors in your note based on the variables you have selected. This validation is only offered as a suggestion for you. PLEASE NOTE THAT THE VALIDATION TEXT WILL BE REMOVED WHEN YOU FINALIZE YOUR NOTE. IF YOU WANT TO FAX A PRELIMINARY NOTE YOU WILL NEED TO TOGGLE THIS TO 'NO' IF YOU DO NOT WANT IT IN YOUR FAXED NOTE.

## 2022-01-14 ENCOUNTER — TELEMEDICINE (OUTPATIENT)
Dept: MEDICAL GROUP | Facility: MEDICAL CENTER | Age: 82
End: 2022-01-14
Payer: MEDICARE

## 2022-01-14 VITALS — HEIGHT: 64 IN | BODY MASS INDEX: 21.34 KG/M2 | WEIGHT: 125 LBS

## 2022-01-14 DIAGNOSIS — J40 BRONCHITIS: ICD-10-CM

## 2022-01-14 PROCEDURE — 99214 OFFICE O/P EST MOD 30 MIN: CPT | Mod: 95 | Performed by: PHYSICIAN ASSISTANT

## 2022-01-14 RX ORDER — AZITHROMYCIN 250 MG/1
TABLET, FILM COATED ORAL
COMMUNITY
Start: 2022-01-09 | End: 2022-01-14

## 2022-01-14 RX ORDER — AZITHROMYCIN 250 MG/1
TABLET, FILM COATED ORAL
COMMUNITY
Start: 2022-01-09 | End: 2022-04-04

## 2022-01-14 RX ORDER — GUAIFENESIN 200 MG/1
200-400 TABLET ORAL
COMMUNITY
Start: 2022-01-09 | End: 2022-01-16

## 2022-01-14 RX ORDER — METHYLPREDNISOLONE 4 MG/1
TABLET ORAL
Qty: 21 TABLET | Refills: 0 | Status: SHIPPED | OUTPATIENT
Start: 2022-01-14 | End: 2022-01-20

## 2022-01-14 RX ORDER — NICOTINE POLACRILEX 2 MG
GUM BUCCAL
COMMUNITY
End: 2022-04-04

## 2022-01-14 ASSESSMENT — FIBROSIS 4 INDEX: FIB4 SCORE: 1.9

## 2022-01-17 NOTE — PROGRESS NOTES
Virtual Visit: Established Patient   This visit was conducted via Zoom using secure and encrypted videoconferencing technology.   The patient was in a private location in the state of [place].    The patient's identity was confirmed and verbal consent was obtained for this virtual visit.    Subjective:   CC:   Chief Complaint   Patient presents with   • Cough     x 2 weeks, finished abx 1/13/22, extremely weak, not better, requesting chest x-ray     Mariama Gonzales is a 81 y.o. female presenting for evaluation and management of:    Continued cough. Patient of Dr. Goddard.    Patient has had a cough for 2 weeks. Has been seen at the [facility] clinic and prescribed antibiotic, neg covid testing. Feeling better overall, still coughing, wonders about chest xray. No SOB or difficulty breathing. No chest pain or pressure. Has had pneumonia in the past and worries about pneumonia. No travel. No known sick exposure.    ROS no fever/chills. No headache/dizziness. No neck pain or stiffness. No n/v/d/c or abdominal pain. No rash or skin lesion.      Current medicines (including changes today)  Current Outpatient Medications   Medication Sig Dispense Refill   • methylPREDNISolone (MEDROL) 4 MG Tab 6 po day 1 then 5 po day 2 then 4 po day 3 then 3 po day 4 then 2 po day 5 then 1 po day 6 21 Tablet 0   • fluvoxamine (LUVOX) 100 MG tablet      • ALPRAZolam (XANAX) 0.25 MG Tab      • azelastine (ASTELIN) 137 MCG/SPRAY nasal spray Administer 1 Spray into affected nostril(S) 2 times a day. 30 mL 2   • diazePAM (VALIUM) 5 MG Tab Taking 2.5 at night PRN     • triamterene/hctz (MAXZIDE-25/DYAZIDE) 37.5-25 MG Cap TAKE ONE CAPSULE BY MOUTH EVERY MORNING 30 Cap 0   • albuterol 108 (90 Base) MCG/ACT Aero Soln inhalation aerosol Inhale 2 Puffs by mouth every 6 hours as needed for Shortness of Breath. 8.5 g 2   • vitamin D, Ergocalciferol, (DRISDOL) 80015 units Cap capsule Take  by mouth every 7 days.     • lidocaine (XYLOCAINE) 5 %  "Ointment      • triamcinolone acetonide (KENALOG) 0.1 % Ointment      • Fluvoxamine Maleate 100 MG CAPSULE SR 24 HR Take 100 mg by mouth every bedtime.     • Turmeric, Curcuma Longa, (CURCUMIN) Powder by Does not apply route.     • GLUCOSAMINE-CHONDROITIN PO Take  by mouth.     • Hyoscyamine Sulfate (LEVSIN PO) Take  by mouth as needed.     • BIOTIN 5000 PO Take  by mouth every day.     • Multiple Vitamin (MULTIVITAMIN PO) Take  by mouth every day.     • naproxen (NAPROSYN) 375 MG TABS Take 375 mg by mouth 2 times a day, with meals.     • Biotin 1 MG Cap Take  by mouth. (Patient not taking: Reported on 1/14/2022)     • azithromycin (ZITHROMAX) 250 MG Tab  (Patient not taking: Reported on 1/14/2022)       No current facility-administered medications for this visit.       Patient Active Problem List    Diagnosis Date Noted   • Acute cystitis without hematuria 06/10/2021   • Seasonal allergies 06/10/2021   • Cat bite 12/01/2020   • Reactive depression 12/01/2020   • Illness 12/17/2019   • Left lower quadrant pain 12/17/2019   • Dizziness 09/24/2019   • Other fatigue 09/24/2019   • Pure hypercholesterolemia 12/20/2018   • Essential hypertension 12/20/2018   • Secondary osteoarthritis 09/12/2018   • Recurrent major depressive disorder, in partial remission (HCC) 06/26/2018   • Fibromyalgia 05/17/2018   • Other osteoporosis without current pathological fracture 11/03/2017   • Vitamin D deficiency 11/03/2017   • 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  Skin: No rashes in visible areas.  Eye: Round. Conjunctiva clear, lids normal. No icterus.   ENMT: Lips pink without lesions, good dentition, moist mucous membranes. Phonation normal.  Neck: No masses, no thyromegaly. Moves freely without pain.  Respiratory: Unlabored respiratory effort, no cough or audible wheeze  Psych: Alert and oriented x3, normal affect and mood.     Assessment and Plan:   The following treatment plan was discussed:     1. Bronchitis  - methylPREDNISolone (MEDROL) 4 MG Tab; 6 po day 1 then 5 po day 2 then 4 po day 3 then 3 po day 4 then 2 po day 5 then 1 po day 6  Dispense: 21 Tablet; Refill: 0  - DX-CHEST-2 VIEWS; Future    Other orders  - Biotin 1 MG Cap; Take  by mouth. (Patient not taking: Reported on 1/14/2022)  - azithromycin (ZITHROMAX) 250 MG Tab; Take  by mouth. (Patient not taking: Reported on 1/14/2022)  - azithromycin (ZITHROMAX) 250 MG Tab;  (Patient not taking: Reported on 1/14/2022)  - guaifenesin 200 MG tablet; Take 200-400 mg by mouth.      Follow-up: No follow-ups on file.

## 2022-02-05 ENCOUNTER — PATIENT MESSAGE (OUTPATIENT)
Dept: MEDICAL GROUP | Facility: MEDICAL CENTER | Age: 82
End: 2022-02-05

## 2022-04-04 ENCOUNTER — OFFICE VISIT (OUTPATIENT)
Dept: MEDICAL GROUP | Facility: MEDICAL CENTER | Age: 82
End: 2022-04-04
Payer: MEDICARE

## 2022-04-04 VITALS
WEIGHT: 125 LBS | BODY MASS INDEX: 21.34 KG/M2 | SYSTOLIC BLOOD PRESSURE: 124 MMHG | DIASTOLIC BLOOD PRESSURE: 78 MMHG | HEIGHT: 64 IN | TEMPERATURE: 98.1 F | OXYGEN SATURATION: 95 % | HEART RATE: 72 BPM

## 2022-04-04 DIAGNOSIS — K22.70 BARRETT'S ESOPHAGUS WITHOUT DYSPLASIA: ICD-10-CM

## 2022-04-04 DIAGNOSIS — R53.83 FATIGUE, UNSPECIFIED TYPE: ICD-10-CM

## 2022-04-04 DIAGNOSIS — E78.00 PURE HYPERCHOLESTEROLEMIA: ICD-10-CM

## 2022-04-04 DIAGNOSIS — M32.8 OTHER FORMS OF SYSTEMIC LUPUS ERYTHEMATOSUS, UNSPECIFIED ORGAN INVOLVEMENT STATUS (HCC): ICD-10-CM

## 2022-04-04 DIAGNOSIS — M35.00 SJOGREN'S SYNDROME, WITH UNSPECIFIED ORGAN INVOLVEMENT (HCC): ICD-10-CM

## 2022-04-04 DIAGNOSIS — R05.9 COUGHING: ICD-10-CM

## 2022-04-04 DIAGNOSIS — I35.9 AORTIC VALVE DISEASE: ICD-10-CM

## 2022-04-04 DIAGNOSIS — M25.50 ARTHRALGIA, UNSPECIFIED JOINT: ICD-10-CM

## 2022-04-04 PROCEDURE — 99214 OFFICE O/P EST MOD 30 MIN: CPT | Performed by: FAMILY MEDICINE

## 2022-04-04 ASSESSMENT — FIBROSIS 4 INDEX: FIB4 SCORE: 1.9

## 2022-04-04 ASSESSMENT — PATIENT HEALTH QUESTIONNAIRE - PHQ9: CLINICAL INTERPRETATION OF PHQ2 SCORE: 0

## 2022-04-04 NOTE — ASSESSMENT & PLAN NOTE
Patient reports worsening joint pain, worst in her hands.  She does have history of known SLE and Sjogren's.  No longer seeing a rheumatologist but is interested in getting reestablished.

## 2022-04-04 NOTE — PROGRESS NOTES
Subjective:     CC: Diagnoses of Gtz's esophagus without dysplasia, Coughing, Arthralgia, unspecified joint, Other forms of systemic lupus erythematosus, unspecified organ involvement status (HCC), Sjogren's syndrome, with unspecified organ involvement (HCC), Aortic valve disease, Pure hypercholesterolemia, and Fatigue, unspecified type were pertinent to this visit.    HPI: Patient is a 81 y.o. female established patient who presents today with the following concerns.       Gtz's esophagus without dysplasia  Chronic problem. Does not take PPI as she could not tolerate it, caused GI discomfort.   Last edoscopy was at least 3 yrs ago.   Having more coughing and mucus secretion  Notes worsening buring    Aortic valve disease  Had echo scheduled, is seeing Dr. Bolanos 5/3/22.     Joint pain  Patient reports worsening joint pain, worst in her hands.  She does have history of known SLE and Sjogren's.  No longer seeing a rheumatologist but is interested in getting reestablished.      Past Medical History:   Diagnosis Date   • Allergy, unspecified not elsewhere classified    • Anxiety    • Arthritis    • Blood transfusion, without reported diagnosis    • CATARACT    • Dental disorder     lichen planus   • Depression    • Fibromyalgia    • GERD (gastroesophageal reflux disease)    • GERD (gastroesophageal reflux disease)    • Headache(784.0)    • Headache, classical migraine    • Kidney calculi    • Lichen planus    • Lupus (HCC)    • Migraine without aura, without mention of intractable migraine without mention of status migrainosus    • Mitral valve prolapse    • Osteoporosis, unspecified    • Other convulsions     Isolated seizure March, 2014   • Pain     joints, muscles   • Psychiatric problem     depression   • Shingles 10/4/11    dr velasco aware of this back of head   • Sjogren's syndrome (HCC)    • Snoring    • Unspecified asthma(493.90)    • Urinary tract infection, site not specified        Social History  "    Tobacco Use   • Smoking status: Former Smoker     Years: 34.00     Quit date: 1978     Years since quittin.2   • Smokeless tobacco: Former User     Quit date: 1978   Vaping Use   • Vaping Use: Never used   Substance Use Topics   • Alcohol use: Yes     Comment: Rarely   • Drug use: No       Current Outpatient Medications Ordered in Epic   Medication Sig Dispense Refill   • fluvoxamine (LUVOX) 100 MG tablet      • ALPRAZolam (XANAX) 0.25 MG Tab      • azelastine (ASTELIN) 137 MCG/SPRAY nasal spray Administer 1 Spray into affected nostril(S) 2 times a day. 30 mL 2   • diazePAM (VALIUM) 5 MG Tab Taking 2.5 at night PRN     • triamterene/hctz (MAXZIDE-25/DYAZIDE) 37.5-25 MG Cap TAKE ONE CAPSULE BY MOUTH EVERY MORNING 30 Cap 0   • albuterol 108 (90 Base) MCG/ACT Aero Soln inhalation aerosol Inhale 2 Puffs by mouth every 6 hours as needed for Shortness of Breath. 8.5 g 2   • vitamin D, Ergocalciferol, (DRISDOL) 11905 units Cap capsule Take  by mouth every 7 days.     • lidocaine (XYLOCAINE) 5 % Ointment      • triamcinolone acetonide (KENALOG) 0.1 % Ointment      • Fluvoxamine Maleate 100 MG CAPSULE SR 24 HR Take 100 mg by mouth every bedtime.     • Turmeric, Curcuma [place]a, (CURCUMIN) Powder by Does not apply route.     • GLUCOSAMINE-CHONDROITIN PO Take  by mouth.     • Hyoscyamine Sulfate (LEVSIN PO) Take  by mouth as needed.     • BIOTIN 5000 PO Take  by mouth every day.     • Multiple Vitamin (MULTIVITAMIN PO) Take  by mouth every day.     • naproxen (NAPROSYN) 375 MG Tab Take 375 mg by mouth 2 times a day, with meals.       No current Epic-ordered facility-administered medications on file.       Allergies:  Ciprofloxacin, Other drug, and Sulfa drugs    Health Maintenance: Completed    Objective:       Exam:  /78 (BP Location: Left arm, Patient Position: Sitting, BP Cuff Size: Adult long)   Pulse 72   Temp 36.7 °C (98.1 °F) (Temporal)   Ht 1.626 m (5' 4\")   Wt 56.7 kg (125 lb)   SpO2 95%   " BMI 21.46 kg/m²  Body mass index is 21.46 kg/m².    General: Normal appearing. No distress.  HEAD: NCAT  EYES: conjunctiva clear, lids without ptosis, pupils equal and reactive to light  EARS: ears normal shape and contour.  MOUTH: normal dentition   Neck:  Normal ROM  Pulmonary: Normal effort. Normal respiratory rate.  Cardiovascular: Well perfused. No LE edema  Neurologic: Grossly normal, no focal deficits  Skin: Warm and dry.  No obvious lesions.  Musculoskeletal: Normal gait and station.   Psych: Normal mood and affect. Alert and oriented x3. Judgment and insight is normal.     Labs: 6/12/2021 results reviewed and discussed with the patient, questions answered.    Assessment & Plan:     81 y.o. female with the following -     1. Gtz's esophagus without dysplasia  Chronic problem, patient notes worsening symptoms recently.  Referral placed to gastro, labs ordered.  - Referral to Gastroenterology  - Comp Metabolic Panel; Future    2. Coughing  - Referral to Gastroenterology    3. Arthralgia, unspecified joint  Patient reports worsening joint pains.  She believes this is likely associated with her SLE.  New referral placed to rheumatology.  - Referral to Rheumatology    4. Other forms of systemic lupus erythematosus, unspecified organ involvement status (HCC)  - Referral to Rheumatology    5. Sjogren's syndrome, with unspecified organ involvement (HCC)  - Referral to Rheumatology    6. Aortic valve disease  Chronic problem, patient is established with Dr. Roma Aquino now.  She has an echocardiogram scheduled.  Symptomatically doing well.  - CBC WITH DIFFERENTIAL; Future    7. Pure hypercholesterolemia  - Lipid Profile; Future    8. Fatigue, unspecified type  - TSH WITH REFLEX TO FT4; Future      Return in about 4 weeks (around 5/2/2022) for Follow-up labs and annual wellness visit.    Please note that this dictation was created using voice recognition software. I have made every reasonable attempt to correct  obvious errors, but I expect that there are errors of grammar and possibly content that I did not discover before finalizing the note.

## 2022-04-09 ENCOUNTER — HOSPITAL ENCOUNTER (OUTPATIENT)
Dept: LAB | Facility: MEDICAL CENTER | Age: 82
End: 2022-04-09
Attending: FAMILY MEDICINE
Payer: MEDICARE

## 2022-04-09 DIAGNOSIS — I35.9 AORTIC VALVE DISEASE: ICD-10-CM

## 2022-04-09 DIAGNOSIS — E78.00 PURE HYPERCHOLESTEROLEMIA: ICD-10-CM

## 2022-04-09 DIAGNOSIS — K22.70 BARRETT'S ESOPHAGUS WITHOUT DYSPLASIA: ICD-10-CM

## 2022-04-09 DIAGNOSIS — R53.83 FATIGUE, UNSPECIFIED TYPE: ICD-10-CM

## 2022-04-09 LAB
ALBUMIN SERPL BCP-MCNC: 4.2 G/DL (ref 3.2–4.9)
ALBUMIN/GLOB SERPL: 1.8 G/DL
ALP SERPL-CCNC: 71 U/L (ref 30–99)
ALT SERPL-CCNC: 12 U/L (ref 2–50)
ANION GAP SERPL CALC-SCNC: 10 MMOL/L (ref 7–16)
AST SERPL-CCNC: 23 U/L (ref 12–45)
BASOPHILS # BLD AUTO: 1.2 % (ref 0–1.8)
BASOPHILS # BLD: 0.04 K/UL (ref 0–0.12)
BILIRUB SERPL-MCNC: 0.6 MG/DL (ref 0.1–1.5)
BUN SERPL-MCNC: 14 MG/DL (ref 8–22)
CALCIUM SERPL-MCNC: 8.9 MG/DL (ref 8.5–10.5)
CHLORIDE SERPL-SCNC: 103 MMOL/L (ref 96–112)
CHOLEST SERPL-MCNC: 224 MG/DL (ref 100–199)
CO2 SERPL-SCNC: 24 MMOL/L (ref 20–33)
CREAT SERPL-MCNC: 0.8 MG/DL (ref 0.5–1.4)
EOSINOPHIL # BLD AUTO: 0.19 K/UL (ref 0–0.51)
EOSINOPHIL NFR BLD: 5.8 % (ref 0–6.9)
ERYTHROCYTE [DISTWIDTH] IN BLOOD BY AUTOMATED COUNT: 44.1 FL (ref 35.9–50)
FASTING STATUS PATIENT QL REPORTED: NORMAL
GFR SERPLBLD CREATININE-BSD FMLA CKD-EPI: 74 ML/MIN/1.73 M 2
GLOBULIN SER CALC-MCNC: 2.4 G/DL (ref 1.9–3.5)
GLUCOSE SERPL-MCNC: 77 MG/DL (ref 65–99)
HCT VFR BLD AUTO: 43.5 % (ref 37–47)
HDLC SERPL-MCNC: 71 MG/DL
HGB BLD-MCNC: 14.3 G/DL (ref 12–16)
IMM GRANULOCYTES # BLD AUTO: 0.02 K/UL (ref 0–0.11)
IMM GRANULOCYTES NFR BLD AUTO: 0.6 % (ref 0–0.9)
LDLC SERPL CALC-MCNC: 135 MG/DL
LYMPHOCYTES # BLD AUTO: 1.29 K/UL (ref 1–4.8)
LYMPHOCYTES NFR BLD: 39.4 % (ref 22–41)
MCH RBC QN AUTO: 31.6 PG (ref 27–33)
MCHC RBC AUTO-ENTMCNC: 32.9 G/DL (ref 33.6–35)
MCV RBC AUTO: 96 FL (ref 81.4–97.8)
MONOCYTES # BLD AUTO: 0.29 K/UL (ref 0–0.85)
MONOCYTES NFR BLD AUTO: 8.9 % (ref 0–13.4)
NEUTROPHILS # BLD AUTO: 1.44 K/UL (ref 2–7.15)
NEUTROPHILS NFR BLD: 44.1 % (ref 44–72)
NRBC # BLD AUTO: 0 K/UL
NRBC BLD-RTO: 0 /100 WBC
PLATELET # BLD AUTO: 202 K/UL (ref 164–446)
PMV BLD AUTO: 9.8 FL (ref 9–12.9)
POTASSIUM SERPL-SCNC: 4.2 MMOL/L (ref 3.6–5.5)
PROT SERPL-MCNC: 6.6 G/DL (ref 6–8.2)
RBC # BLD AUTO: 4.53 M/UL (ref 4.2–5.4)
SODIUM SERPL-SCNC: 137 MMOL/L (ref 135–145)
TRIGL SERPL-MCNC: 91 MG/DL (ref 0–149)
TSH SERPL DL<=0.005 MIU/L-ACNC: 3.76 UIU/ML (ref 0.38–5.33)
WBC # BLD AUTO: 3.3 K/UL (ref 4.8–10.8)

## 2022-04-09 PROCEDURE — 80053 COMPREHEN METABOLIC PANEL: CPT

## 2022-04-09 PROCEDURE — 85025 COMPLETE CBC W/AUTO DIFF WBC: CPT

## 2022-04-09 PROCEDURE — 80061 LIPID PANEL: CPT

## 2022-04-09 PROCEDURE — 36415 COLL VENOUS BLD VENIPUNCTURE: CPT

## 2022-04-09 PROCEDURE — 84443 ASSAY THYROID STIM HORMONE: CPT

## 2022-04-21 NOTE — PROGRESS NOTES
[facility]  [address], [address], [place] [address]  Phone: [phone] / Fax: [phone]    RHEUMATOLOGY FOLLOW-UP VISIT NOTE  (NEW TO ME)      DATE OF SERVICE: 04/22/2022    PRIMARY CARE PROVIDER:   Verona Goddard M.D.  [address]  [facility] [id number]    LAST CLINIC VISIT:  Visit date not found      SUBJECTIVE:     CHIEF COMPLAINT:   Chief Complaint   Patient presents with   • Follow-Up     Establish care for systemic lupus erythematosus       RHEUMATOLOGIC HISTORY: (Partially obtained from Dr. Olga Garrett's note dated 10/24/19 which was the last rheumatology visit)  Mariama Gonzales is an 81 y.o. female with pertinent history notable for presumed SLE diagnosed in 1990 by a local rheumatologist in [place] who has since retired. This diagnosis was reportedly based on episodic rash on face (reportedly malar) and arms (both not necessarily related to sun exposure), multiple joint pain, chronic fatigue, hair shedding, dry eyes/mouth, oral sores (later diagnosed as lichen planus), and Raynaud phenomenon all in the setting of positive JOSÉ ANTONIO. Around the time she was diagnosed, she was reportedly noted to have had EBV and pseudomonas infections that were effectively treated. Her presumed lupus treatments have included Plaquenil (took for 3-4 years and stopped due to apparent ineffectiveness), prednisone 50mg-10mg taper (tapered off over a period of 15 years), methotrexate (took only 2 doses and stopped due to lung infections and UTIs while on it with prednisone), and ibuprofen or naproxen as needed (these have been helpful). At present she reports multiple joint pain (involving hands, wrists, and lower back), dry eyes (occasionally uses eye drops), dry mouth (frequently uses water sips), and flares of extreme fatigue associated with worsening symptoms and facial/extremity erythema (not necessarily related to sun exposure), hair loss (had two episodes of  bald spots since 2016 that resolved with one associated with shingles), and fingers/toes turning white/purple on cold exposure. She notes 1-2 hours of morning stiffness that initially improves with activity but her joints hurt more by the end of the day following her activities.  Pertinent labs as of 8/2019: Negative/normal JOSÉ ANTONIO, RF, ACPA, ESR, CRP, UA, C3 and mildly low C4 of 17.    REVIEW OF SYSTEMS:  Except as noted in the history above, a complete review of systems with emphasis on autoimmune inflammatory conditions was otherwise negative for any significant symptoms.      ACTIVE PROBLEM LIST:  Patient Active Problem List   Diagnosis   • Lupus (systemic lupus erythematosus) ([facility])   • Sicca complex ([facility])   • Aortic valve disease   • Lichen planus   • Hyponatremia   • Migraine without aura   • Gtz's esophagus without dysplasia   • Reactive airway disease without complication   • Chronic right-sided low back pain with sciatica   • Other osteoporosis without current pathological fracture   • Vitamin D deficiency   • Fibromyalgia   • Recurrent major depressive disorder, in partial remission ([facility])   • Secondary osteoarthritis   • Pure hypercholesterolemia   • Essential hypertension   • Dizziness   • Other fatigue   • Illness   • Left lower quadrant pain   • Cat bite   • Reactive depression   • Acute cystitis without hematuria   • Seasonal allergies   • Polyarthralgia   • Primary osteoarthritis involving multiple joints   No problem-specific Assessment & Plan notes found for this encounter.      PAST MEDICAL HISTORY:  Past Medical History:   Diagnosis Date   • Allergy, unspecified not elsewhere classified    • Anxiety    • Arthritis    • Blood transfusion, without reported diagnosis    • CATARACT    • Dental disorder     lichen planus   • Depression    • Fibromyalgia    • GERD (gastroesophageal reflux disease)    • GERD (gastroesophageal reflux disease)    • Headache(784.0)    • Headache, classical migraine    •  Kidney calculi    • Lichen planus    • Lupus (HCC)    • Migraine without aura, without mention of intractable migraine without mention of status migrainosus    • Mitral valve prolapse    • Osteoporosis, unspecified    • Other convulsions     Isolated seizure March, 2014   • Pain     joints, muscles   • Psychiatric problem     depression   • Shingles 10/4/11    dr velasco aware of this back of head   • Sjogren's syndrome (HCC)    • Snoring    • Unspecified asthma(493.90)    • Urinary tract infection, site not specified        PAST SURGICAL HISTORY:  Past Surgical History:   Procedure Laterality Date   • HIP CANNULATED SCREW  1/28/2014    Performed by Cristian Delgado M.D. at SURGERY [facility] ORS   • CLOSED REDUCTION  8/6/2012    Performed by CRISTIAN DELGADO at SURGERY [facility] ORS   • HIP CANNULATED SCREW  8/6/2012    Performed by CRISTIAN DELGADO at [facility] ORS   • SHOULDER DECOMPRESSION ARTHROSCOPIC  10/6/2011    Performed by LARY CAR at [facility] ORS   • CLAVICLE DISTAL EXCISION  10/6/2011    Performed by LARY CAR at [facility]   • OTHER  2007    reconstruction of shoulder to cover chest wall   • MAMMOPLASTY AUGMENTATION  1988    removed, post op complications   • OTHER  1988    17 reconstruction surgeries post mammoplasty   • MASTECTOMY  1972    b/l   • BREAST BIOPSY  1970's    x 5   • ABDOMINAL EXPLORATION     • EYE SURGERY     • OPEN REDUCTION         MEDICATIONS:  Current Outpatient Medications   Medication Sig Dispense Refill   • famotidine (PEPCID) 20 MG Tab      • ibuprofen (MOTRIN) 600 MG Tab Take 600 mg by mouth every 6 hours as needed.     • fluvoxamine (LUVOX) 100 MG tablet      • ALPRAZolam (XANAX) 0.25 MG Tab      • azelastine (ASTELIN) 137 MCG/SPRAY nasal spray Administer 1 Spray into affected nostril(S) 2 times a day. 30 mL 2   • diazePAM (VALIUM) 5 MG Tab Taking 2.5 at night PRN     • albuterol 108 (90 Base) MCG/ACT Aero Soln  "inhalation aerosol Inhale 2 Puffs by mouth every 6 hours as needed for Shortness of Breath. 8.5 g 2   • vitamin D, Ergocalciferol, (DRISDOL) 93260 units Cap capsule Take  by mouth every 7 days.     • Fluvoxamine Maleate 100 MG CAPSULE SR 24 HR Take 100 mg by mouth every bedtime.     • Turmeric, Curcuma Longa, (CURCUMIN) Powder by Does not apply route.     • Hyoscyamine Sulfate (LEVSIN PO) Take  by mouth as needed.     • BIOTIN 5000 PO Take  by mouth every day.     • Multiple Vitamin (MULTIVITAMIN PO) Take  by mouth every day.     • naproxen (NAPROSYN) 375 MG Tab Take 375 mg by mouth 2 times a day, with meals.     • triamterene/hctz (MAXZIDE-25/DYAZIDE) 37.5-25 MG Cap TAKE ONE CAPSULE BY MOUTH EVERY MORNING (Patient not taking: No sig reported) 30 Cap 0     No current facility-administered medications for this visit.       ALLERGIES:   Allergies   Allergen Reactions   • Ciprofloxacin    • Other Drug      \"tumor necrosis factor drugs\" infections   • Sulfa Drugs Hives       IMMUNIZATIONS:  Immunization History   Administered Date(s) Administered   • Hep A/HEP B Combined Vaccine (TwinRix) 11/16/2013, 12/16/2013   • Hepatitis B Vaccine (Adol/Adult) 02/28/2012   • INFLUENZA TIV (IM) 09/07/2010, 10/28/2013   • Influenza Seasonal Injectable - Historical Data 11/09/2012, 10/28/2013, 10/31/2014   • Influenza Vaccine Adult HD 10/22/2011, 11/24/2015, 09/12/2018, 09/14/2019, 09/18/2021   • Influenza Vaccine H1N1 - HISTORICAL DATA 01/19/2010, 01/19/2010, 01/19/2010   • Influenza Vaccine Pediatric Split - Historical Data 09/07/2010, 11/09/2012, 10/28/2013, 10/31/2014   • Influenza Vaccine Quad Inj (Pf) 08/25/2020   • Influenza Vaccine Quad Inj (Preserved) 11/09/2012, 10/31/2014, 09/28/2017   • Influenza, Unspecified - HISTORICAL DATA 09/07/2010, 09/11/2021   • Pfizer SARS-CoV-2 Vaccine 12+ 01/14/2021, 02/04/2021, 08/30/2021   • Pneumococcal Conjugate Vaccine (Prevnar/PCV-13) 11/24/2015   • Pneumococcal polysaccharide vaccine " "(PPSV-23) 2012   • TD Vaccine 2006, 2010   • Zoster Vaccine Live (ZVL) (Zostavax) - HISTORICAL DATA 2012       SOCIAL HISTORY:   Social History     Tobacco Use   • Smoking status: Former Smoker     Years: 34.00     Quit date: 1978     Years since quittin.2   • Smokeless tobacco: Former User     Quit date: 1978   Vaping Use   • Vaping Use: Never used   Substance Use Topics   • Alcohol use: Yes     Comment: Rarely   • Drug use: No       FAMILY HISTORY:  Family History   Problem Relation Age of Onset   • Arthritis Mother    • Cancer Father    • Heart Disease Father    • Hypertension Father    • Diabetes Maternal Aunt    • Cancer Maternal Uncle    • Heart Disease Maternal Grandmother    • Diabetes Maternal Grandfather    • Genetic Disorder Paternal Grandmother    • Heart Disease Paternal Grandfather    • Hyperlipidemia Paternal Grandfather    • Diabetes Maternal Aunt    • Genetic Disorder Maternal Uncle    • Cancer Paternal Aunt    • Cancer Paternal Aunt    • Cancer Paternal Uncle         OBJECTIVE:     Vital Signs: /60 (BP Location: Left arm, Patient Position: Sitting, BP Cuff Size: Adult)   Pulse 73   Temp 37.3 °C (99.2 °F) (Temporal)   Resp 16   Ht 1.626 m (5' 4\")   Wt 57.7 kg (127 lb 3.2 oz)   SpO2 97% Body mass index is 21.83 kg/m².    General: Appears well and comfortable; no acute distress  Eyes: Extraocular muscles and vision intact; no conjunctival lesions  ENT: Oral mucosa pink and moist; no oral or nasal lesions  Head/Neck: No scalp lesions; neck supple; no cervical lymphadenopathy  Cardiovascular: Regular rate and rhythm; no murmurs  Respiratory: Clear to auscultation bilaterally; no wheezes, rales, or rhonchi  Gastrointestinal: Abdomen soft and non-tender; no masses or organomegaly  Integumentary: Skin soft and supple; no significant cutaneous lesions  Musculoskeletal: Mild poorly localized tenderness on hands, wrists, and large muscle groups of the " upper/lower extremities and upper/mid back; bony hypertrophy of bilateral 1st CMC joints as well as PIP and DIP joints consistent with Mack's and Heberden's nodes, respectively; ROM of hands/wrists somewhat limited by osteoarthritic changes; no joint swelling, warmth, erythema, or signs of synovitis  Neurologic: No focal sensory or motor deficits  Psychiatric: Mood and affect appropriate      LABORATORY RESULTS REVIEWED AND INTERPRETED BY ME:  Lab Results   Component Value Date/Time    WBC 3.3 (L) 04/09/2022 08:49 AM    RBC 4.53 04/09/2022 08:49 AM    HEMOGLOBIN 14.3 04/09/2022 08:49 AM    HEMATOCRIT 43.5 04/09/2022 08:49 AM    MCV 96.0 04/09/2022 08:49 AM    MCH 31.6 04/09/2022 08:49 AM    MCHC 32.9 (L) 04/09/2022 08:49 AM    RDW 44.1 04/09/2022 08:49 AM    PLATELETCT 202 04/09/2022 08:49 AM    MPV 9.8 04/09/2022 08:49 AM    NEUTS 1.44 (L) 04/09/2022 08:49 AM    LYMPHOCYTES 39.40 04/09/2022 08:49 AM    MONOCYTES 8.90 04/09/2022 08:49 AM    EOSINOPHILS 5.80 04/09/2022 08:49 AM    BASOPHILS 1.20 04/09/2022 08:49 AM     Lab Results   Component Value Date/Time    SODIUM 137 04/09/2022 08:49 AM    POTASSIUM 4.2 04/09/2022 08:49 AM    CHLORIDE 103 04/09/2022 08:49 AM    CO2 24 04/09/2022 08:49 AM    GLUCOSE 77 04/09/2022 08:49 AM    BUN 14 04/09/2022 08:49 AM    CREATININE 0.80 04/09/2022 08:49 AM     Lab Results   Component Value Date/Time    ASTSGOT 23 04/09/2022 08:49 AM    ALTSGPT 12 04/09/2022 08:49 AM    ALKPHOSPHAT 71 04/09/2022 08:49 AM    TBILIRUBIN 0.6 04/09/2022 08:49 AM    TOTPROTEIN 6.6 04/09/2022 08:49 AM    TOTPROTEIN 7.0 08/21/2019 12:56 PM    ALBUMIN 4.2 04/09/2022 08:49 AM    ALBUMIN 4.59 08/21/2019 12:56 PM    CALCIUM 8.9 04/09/2022 08:49 AM    MAGNESIUM 1.9 01/29/2014 05:05 AM     Lab Results   Component Value Date/Time    SEDRATEWES 8 08/21/2019 12:56 PM    CREACTPROT 0.14 08/21/2019 12:56 PM     Lab Results   Component Value Date/Time    RHEUMFACTN <10 08/03/2017 12:16 PM    CCPANTIBODY 4  08/03/2017 12:16 PM     Lab Results   Component Value Date/Time    ANTINUCAB None Detected 08/21/2019 12:56 PM     Lab Results   Component Value Date/Time    SSA60 1 08/03/2017 12:16 PM    SSA52 3 08/03/2017 12:16 PM     Lab Results   Component Value Date/Time    CPKTOTAL 36 12/03/2012 08:27 PM     Lab Results   Component Value Date/Time    PROTHROMBTM 12.7 09/13/2016 04:01 PM    INR 0.93 09/13/2016 04:01 PM     Lab Results   Component Value Date/Time    E5EWPEURPEW 100.0 08/21/2019 12:56 PM    R5EHXWDYUMH 17.0 (L) 08/21/2019 12:56 PM     Lab Results   Component Value Date/Time    COLORURINE Yellow 08/21/2019 12:57 PM    SPECGRAVITY 1.011 08/21/2019 12:57 PM    PHURINE 7.0 08/21/2019 12:57 PM    GLUCOSEUR Negative 08/21/2019 12:57 PM    KETONES Negative 08/21/2019 12:57 PM    PROTEINURIN Negative 08/21/2019 12:57 PM     Lab Results   Component Value Date/Time    TSHULTRASEN 3.760 04/09/2022 08:49 AM    FREET4 0.86 08/21/2019 12:51 PM     Lab Results   Component Value Date/Time    25HYDROXY 33 06/12/2021 09:31 AM    PTHINTACT 74.8 (H) 08/21/2019 12:56 PM    ISTATCREAT 0.9 12/03/2012 09:14 PM     Lab Results   Component Value Date/Time    FOLATE >24.0 08/18/2014 10:44 AM     Lab Results   Component Value Date/Time    CHOLSTRLTOT 224 (H) 04/09/2022 08:49 AM     (H) 04/09/2022 08:49 AM    HDL 71 04/09/2022 08:49 AM    TRIGLYCERIDE 91 04/09/2022 08:49 AM     Lab Results   Component Value Date/Time    HEPBSAG RR 08/03/2017 12:00 PM    HEPBCORIGM RR 08/03/2017 12:00 PM    HEPCAB RR 08/03/2017 12:00 PM       RADIOLOGY RESULTS REVIEWED AND INTERPRETED BY ME:  Results for orders placed during the hospital encounter of 06/02/05    DX-CERVICAL SPINE-2 OR 3 VIEWS    Impression  IMPRESSION:    1. DEGENERATIVE DISC DISEASE NOTED AT C5-6 AND C6-7.  2. CERVICOTHORACIC JUNCTION IS NOT WELL VISUALIZED.  3. NO SUBLUXATION IDENTIFIED.    Results for orders placed during the hospital encounter of 11/15/19    DX-JOINT SURVEY-HANDS  SINGLE VIEW    Impression  1.  There has been interval progression of multifocal osteoarthritis involving the IP joints, 1st CMC joints, and scaphotrapezial articulations bilaterally.  2.  There is new slight ulnar subluxation of the right 2nd and 3rd PIP joints.  3.  There has been interval surgery involving the distal left radius.    Results for orders placed during the hospital encounter of 05/21/09    DX-KNEE 2-    Impression  IMPRESSION:    NO FRACTURE OR DISLOCATION OF THE LEFT KNEE.    Results for orders placed during the hospital encounter of 05/21/09    DX-KNEES-AP BILATERAL STANDING    Impression  IMPRESSION:    LOSS OF JOINT SPACE SEEN IN BOTH KNEES INDICATING DEGENERATIVE CHANGE OF  MODERATE DEGREE WHICH IS WORST IN THE LATERAL COMPARTMENT RIGHT KNEE.    Results for orders placed during the hospital encounter of 03/08/19    DS-BONE DENSITY STUDY (DEXA)    Impression  According to the World Health Organization classification, bone mineral density of this patient is osteoporotic.    FRAX score not obtained for this patient.    Results for orders placed in visit on 08/03/17    US-RENAL    Impression  Minimal bilateral pelviectasis.    Small post void residual.    Results for orders placed during the hospital encounter of 02/24/09    MR-CERVICAL SPINE-WITH & W/O    Impression  IMPRESSION:    1. MULTILEVEL CERVICAL DEGENERATIVE DISK DISEASE.  THERE IS POSTERIOR  DISK BULGING AT C4-5 AND C3-4 WITH MILD CENTRAL CANAL STENOSIS PRESENT AT  C3-4 SECONDARY TO BULGE AND LIGAMENTUM FLAVUM HYPERTROPHY.  THERE IS ALSO  LEFT-SIDED NEURAL FORAMINAL NARROWING AT C3-4 AND C5-6 SECONDARY TO  UNCINATE SPURRING.  THE BULGE AT C4-5 DOES ABUT THE CERVICAL CORD.    2. TINY CENTRAL PROTRUSION AT C2-3 WHICH ABUTS THE CERVICAL CORD.    3. NO ABNORMAL CONTRAST WITHIN THE SPINAL CORD OR SPINAL COLUMN.    Results for orders placed during the hospital encounter of 09/09/10    MR-CERVICAL SPINE-W/O    Results for orders placed during  the hospital encounter of 11/15/10    MR-LUMBAR SPINE-W/O    Results for orders placed during the hospital encounter of 12/03/12    LE VENOUS DUPLEX (DVT)    All relevant laboratory and imaging results reported on this note were reviewed and interpreted by me.      ASSESSMENT AND PLAN:     Mariama Gonzales is a 81 y.o. female with history as noted above whose presentation merits the following diagnostic and clinical status impressions and recommendations:    1. Polyarthralgia  Her past/present history/physical and diagnostic investigations reviewed by me, irrespective of her previously positive JOSÉ ANTONIO (presently negative), do not support a definitive diagnosis of systemic lupus based on the current diagnostic criteria for systemic lupus (reference below). Notably, the JOSÉ ANTONIO test is highly sensitive and lacks diagnostic specificity as it can be seen in a variety of non-rheumatic conditions, such as autoimmune thyroid disease (especially Hashimoto's thyroiditis), acute or chronic infections (especially viral), and malignancy (especially lymphoma), as well as in over 10% of healthy individuals with no clinical evidence of autoimmune rheumatic disease. It is possible that her previously positive JOSÉ ANTONIO back in 1990 (when she was diagnosed with lupus) was actually precipitated by her reported preceding EBV and pseudomonas infections that were effectively treated. In any case, her current presentation has features of both inflammatory arthritis (based on history alone) and biomechanical joint pain secondary to osteoarthritis (based on history and physical). The inflammatory differentials would include spondyloarthritis, rheumatoid arthritis, and lupus-spectrum arthritis given her reported history of lupus. In addition to her osteoarthritis, the biomechanical component of her pain likely includes central or myofascial pain syndrome of which fibromyalgia is considered a part. That said, it is reasonable to undertake further  investigation with the confirmatory workup noted below for diagnostic clarification.  - JOSÉ ANTONIO REFLEXIVE PROFILE  - COMPLEMENT C3  - COMPLEMENT C4  - THYROID PEROXIDASE  (TPO) AB  - RHEUMATOID ARTHRITIS FACTOR  - CCP ANTIBODY  - HLA-B27  - URIC ACID    2. Primary osteoarthritis involving multiple joints  As noted above, this is most likely a significant contributor to her overall joint pain burden.  - Okay to take ibuprofen or naproxen (not both together) as needed given these have been helpful    3. Sicca complex (HCC)  This raises some concern for underlying Sjogren's syndrome, be it primary or secondary.  - Confirmatory work-up as noted above    REFERENCE:  ACR/EULAR 2019 Classification Criteria for Systemic Lupus Erythematosus  · Sofia DEL RIO et al. 2019 European League Against Rheumatism/American College of Rheumatology Classification Criteria for Systemic Lupus Erythematosus. Arthritis Rheumatol. 2019 Sep;71(9):8350-2411. doi: 10.1002/art.92546. Epub 2019 Aug 6. PMID: [id number]; PMCID: [id number].      FOLLOW-UP: Return in about 3 months (around 7/22/2022) for Short.           Thank you for giving me the opportunity to participate in the care of Mariama Gonzales.    Benji Bernal MD, MS  Rheumatologist, [facility]  , Department of Internal Medicine  [facility]

## 2022-04-21 NOTE — PATIENT INSTRUCTIONS
AFTER VISIT INSTRUCTIONS    Below are important information to help you navigate your healthcare needs and help us serve you safely and effectively:  • If laboratory tests and/or imaging studies were ordered, remember to go get them done.  • If new prescriptions or refills were sent to the pharmacy, remember to go pick them up.  • Take your medications exactly as prescribed unless instructed otherwise.  • Follow up with your primary care provider and/or specialists as scheduled.  • If there are significant findings from your lab tests and imaging studies, I will notify you with explanations via [employer] or phone call, otherwise you can view them on [employer] and let me know if you have any questions.  • Sign up for [employer] if you have not already done so, in order to have access to the results of your lab tests and imaging studies, and to be able to send and receive messages from me.  • Please note that [employer] messaging is for non-urgent matters that do not require immediate attention and are typically read only during office hours, so do not expect immediate responses from me.

## 2022-04-22 ENCOUNTER — OFFICE VISIT (OUTPATIENT)
Dept: RHEUMATOLOGY | Facility: MEDICAL CENTER | Age: 82
End: 2022-04-22
Payer: MEDICARE

## 2022-04-22 VITALS
RESPIRATION RATE: 16 BRPM | DIASTOLIC BLOOD PRESSURE: 60 MMHG | OXYGEN SATURATION: 97 % | HEART RATE: 73 BPM | HEIGHT: 64 IN | SYSTOLIC BLOOD PRESSURE: 118 MMHG | WEIGHT: 127.2 LBS | TEMPERATURE: 99.2 F | BODY MASS INDEX: 21.72 KG/M2

## 2022-04-22 DIAGNOSIS — M35.00 SICCA COMPLEX (HCC): ICD-10-CM

## 2022-04-22 DIAGNOSIS — M25.50 POLYARTHRALGIA: ICD-10-CM

## 2022-04-22 DIAGNOSIS — M15.9 PRIMARY OSTEOARTHRITIS INVOLVING MULTIPLE JOINTS: ICD-10-CM

## 2022-04-22 PROBLEM — M15.0 PRIMARY OSTEOARTHRITIS INVOLVING MULTIPLE JOINTS: Status: ACTIVE | Noted: 2022-04-22

## 2022-04-22 PROCEDURE — 99214 OFFICE O/P EST MOD 30 MIN: CPT | Performed by: STUDENT IN AN ORGANIZED HEALTH CARE EDUCATION/TRAINING PROGRAM

## 2022-04-22 RX ORDER — FAMOTIDINE 20 MG/1
TABLET, FILM COATED ORAL
COMMUNITY
Start: 2022-04-14

## 2022-04-22 RX ORDER — IBUPROFEN 600 MG/1
600 TABLET ORAL EVERY 6 HOURS PRN
COMMUNITY
End: 2023-08-02

## 2022-04-22 ASSESSMENT — PAIN SCALES - GENERAL: PAINLEVEL: 6=MODERATE PAIN

## 2022-04-22 ASSESSMENT — FIBROSIS 4 INDEX: FIB4 SCORE: 2.63

## 2022-04-28 ENCOUNTER — HOSPITAL ENCOUNTER (OUTPATIENT)
Dept: LAB | Facility: MEDICAL CENTER | Age: 82
End: 2022-04-28
Attending: STUDENT IN AN ORGANIZED HEALTH CARE EDUCATION/TRAINING PROGRAM
Payer: MEDICARE

## 2022-04-28 LAB
C3 SERPL-MCNC: 88.7 MG/DL (ref 87–200)
C4 SERPL-MCNC: 14.8 MG/DL (ref 19–52)
RHEUMATOID FACT SER IA-ACNC: 13 IU/ML (ref 0–14)
THYROPEROXIDASE AB SERPL-ACNC: 13 IU/ML (ref 0–9)
URATE SERPL-MCNC: 5.1 MG/DL (ref 1.9–8.2)

## 2022-04-28 PROCEDURE — 84550 ASSAY OF BLOOD/URIC ACID: CPT

## 2022-04-28 PROCEDURE — 86431 RHEUMATOID FACTOR QUANT: CPT

## 2022-04-28 PROCEDURE — 86160 COMPLEMENT ANTIGEN: CPT

## 2022-04-28 PROCEDURE — 86812 HLA TYPING A B OR C: CPT | Mod: GA

## 2022-04-28 PROCEDURE — 86200 CCP ANTIBODY: CPT

## 2022-04-28 PROCEDURE — 86376 MICROSOMAL ANTIBODY EACH: CPT

## 2022-04-28 PROCEDURE — 86038 ANTINUCLEAR ANTIBODIES: CPT

## 2022-04-28 PROCEDURE — 36415 COLL VENOUS BLD VENIPUNCTURE: CPT

## 2022-05-01 LAB
CCP IGG SERPL-ACNC: 4 UNITS (ref 0–19)
HLA-B27 QL FC: NEGATIVE
NUCLEAR IGG SER QL IA: NORMAL

## 2022-05-10 ENCOUNTER — OFFICE VISIT (OUTPATIENT)
Dept: MEDICAL GROUP | Facility: MEDICAL CENTER | Age: 82
End: 2022-05-10
Payer: MEDICARE

## 2022-05-10 VITALS
OXYGEN SATURATION: 98 % | DIASTOLIC BLOOD PRESSURE: 64 MMHG | HEART RATE: 71 BPM | HEIGHT: 64 IN | TEMPERATURE: 98 F | BODY MASS INDEX: 21.85 KG/M2 | WEIGHT: 128 LBS | SYSTOLIC BLOOD PRESSURE: 118 MMHG

## 2022-05-10 DIAGNOSIS — K22.70 BARRETT'S ESOPHAGUS WITHOUT DYSPLASIA: ICD-10-CM

## 2022-05-10 DIAGNOSIS — I10 ESSENTIAL HYPERTENSION: ICD-10-CM

## 2022-05-10 DIAGNOSIS — E78.00 PURE HYPERCHOLESTEROLEMIA: ICD-10-CM

## 2022-05-10 DIAGNOSIS — M32.8 OTHER FORMS OF SYSTEMIC LUPUS ERYTHEMATOSUS, UNSPECIFIED ORGAN INVOLVEMENT STATUS (HCC): ICD-10-CM

## 2022-05-10 DIAGNOSIS — E55.9 VITAMIN D DEFICIENCY: ICD-10-CM

## 2022-05-10 DIAGNOSIS — F33.41 RECURRENT MAJOR DEPRESSIVE DISORDER, IN PARTIAL REMISSION (HCC): ICD-10-CM

## 2022-05-10 DIAGNOSIS — G43.009 MIGRAINE WITHOUT AURA AND WITHOUT STATUS MIGRAINOSUS, NOT INTRACTABLE: ICD-10-CM

## 2022-05-10 DIAGNOSIS — M81.8 OTHER OSTEOPOROSIS WITHOUT CURRENT PATHOLOGICAL FRACTURE: ICD-10-CM

## 2022-05-10 DIAGNOSIS — R60.0 LOWER EXTREMITY EDEMA: ICD-10-CM

## 2022-05-10 DIAGNOSIS — I35.9 AORTIC VALVE DISEASE: ICD-10-CM

## 2022-05-10 DIAGNOSIS — L43.9 LICHEN PLANUS: ICD-10-CM

## 2022-05-10 DIAGNOSIS — J30.2 SEASONAL ALLERGIES: ICD-10-CM

## 2022-05-10 DIAGNOSIS — R42 DIZZINESS: ICD-10-CM

## 2022-05-10 DIAGNOSIS — M35.00 SICCA COMPLEX (HCC): ICD-10-CM

## 2022-05-10 DIAGNOSIS — J45.30 MILD PERSISTENT REACTIVE AIRWAY DISEASE WITHOUT COMPLICATION: ICD-10-CM

## 2022-05-10 PROBLEM — N30.00 ACUTE CYSTITIS WITHOUT HEMATURIA: Status: RESOLVED | Noted: 2021-06-10 | Resolved: 2022-05-10

## 2022-05-10 PROBLEM — M54.40 CHRONIC RIGHT-SIDED LOW BACK PAIN WITH SCIATICA: Status: RESOLVED | Noted: 2017-08-03 | Resolved: 2022-05-10

## 2022-05-10 PROBLEM — F32.9 REACTIVE DEPRESSION: Status: RESOLVED | Noted: 2020-12-01 | Resolved: 2022-05-10

## 2022-05-10 PROBLEM — I34.1 MITRAL VALVE PROLAPSE: Status: ACTIVE | Noted: 2022-05-03

## 2022-05-10 PROBLEM — G89.29 CHRONIC RIGHT-SIDED LOW BACK PAIN WITH SCIATICA: Status: RESOLVED | Noted: 2017-08-03 | Resolved: 2022-05-10

## 2022-05-10 PROBLEM — R69 ILLNESS: Status: RESOLVED | Noted: 2019-12-17 | Resolved: 2022-05-10

## 2022-05-10 PROBLEM — R53.83 OTHER FATIGUE: Status: RESOLVED | Noted: 2019-09-24 | Resolved: 2022-05-10

## 2022-05-10 PROBLEM — R10.32 LEFT LOWER QUADRANT PAIN: Status: RESOLVED | Noted: 2019-12-17 | Resolved: 2022-05-10
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
## 2022-05-10 PROCEDURE — G0439 PPPS, SUBSEQ VISIT: HCPCS | Performed by: FAMILY MEDICINE

## 2022-05-10 RX ORDER — POLYETHYLENE GLYCOL 3350, SODIUM SULFATE ANHYDROUS, SODIUM BICARBONATE, SODIUM CHLORIDE, POTASSIUM CHLORIDE 236; 22.74; 6.74; 5.86; 2.97 G/4L; G/4L; G/4L; G/4L; G/4L
POWDER, FOR SOLUTION ORAL
COMMUNITY
Start: 2022-05-09 | End: 2023-05-05

## 2022-05-10 RX ORDER — FLUVOXAMINE MALEATE 100 MG/1
100 CAPSULE, EXTENDED RELEASE ORAL DAILY
COMMUNITY
End: 2022-05-10

## 2022-05-10 RX ORDER — TRIAMTERENE AND HYDROCHLOROTHIAZIDE 37.5; 25 MG/1; MG/1
1 CAPSULE ORAL
Qty: 30 CAPSULE | Refills: 2 | Status: SHIPPED | OUTPATIENT
Start: 2022-05-10

## 2022-05-10 RX ORDER — HYOSCYAMINE SULFATE 0.125 MG
0.12 TABLET ORAL
COMMUNITY

## 2022-05-10 SDOH — HEALTH STABILITY: PHYSICAL HEALTH

## 2022-05-10 SDOH — ECONOMIC STABILITY: HOUSING INSECURITY
IN THE LAST 12 MONTHS, WAS THERE A TIME WHEN YOU DID NOT HAVE A STEADY PLACE TO SLEEP OR SLEPT IN A SHELTER (INCLUDING NOW)?: NO

## 2022-05-10 SDOH — ECONOMIC STABILITY: INCOME INSECURITY: IN THE LAST 12 MONTHS, WAS THERE A TIME WHEN YOU WERE NOT ABLE TO PAY THE MORTGAGE OR RENT ON TIME?: NO

## 2022-05-10 SDOH — HEALTH STABILITY: MENTAL HEALTH
STRESS IS WHEN SOMEONE FEELS TENSE, NERVOUS, ANXIOUS, OR CAN'T SLEEP AT NIGHT BECAUSE THEIR MIND IS TROUBLED. HOW STRESSED ARE YOU?: RATHER MUCH

## 2022-05-10 SDOH — ECONOMIC STABILITY: HOUSING INSECURITY: IN THE LAST 12 MONTHS, HOW MANY PLACES HAVE YOU LIVED?: 1

## 2022-05-10 SDOH — ECONOMIC STABILITY: TRANSPORTATION INSECURITY

## 2022-05-10 ASSESSMENT — SOCIAL DETERMINANTS OF HEALTH (SDOH)
IN A TYPICAL WEEK, HOW MANY TIMES DO YOU TALK ON THE PHONE WITH FAMILY, FRIENDS, OR NEIGHBORS?: MORE THAN THREE TIMES A WEEK
HOW OFTEN DO YOU HAVE A DRINK CONTAINING ALCOHOL: NEVER
HOW OFTEN DO YOU ATTEND CHURCH OR RELIGIOUS SERVICES?: MORE THAN 4 TIMES PER YEAR
HOW OFTEN DO YOU ATTENT MEETINGS OF THE CLUB OR ORGANIZATION YOU BELONG TO?: 1 TO 4 TIMES PER YEAR
IN A TYPICAL WEEK, HOW MANY TIMES DO YOU TALK ON THE PHONE WITH FAMILY, FRIENDS, OR NEIGHBORS?: MORE THAN THREE TIMES A WEEK
HOW OFTEN DO YOU GET TOGETHER WITH FRIENDS OR RELATIVES?: MORE THAN THREE TIMES A WEEK
DO YOU BELONG TO ANY CLUBS OR ORGANIZATIONS SUCH AS CHURCH GROUPS UNIONS, FRATERNAL OR ATHLETIC GROUPS, OR SCHOOL GROUPS?: YES
DO YOU BELONG TO ANY CLUBS OR ORGANIZATIONS SUCH AS CHURCH GROUPS UNIONS, FRATERNAL OR ATHLETIC GROUPS, OR SCHOOL GROUPS?: YES
HOW OFTEN DO YOU ATTENT MEETINGS OF THE CLUB OR ORGANIZATION YOU BELONG TO?: 1 TO 4 TIMES PER YEAR
HOW OFTEN DO YOU GET TOGETHER WITH FRIENDS OR RELATIVES?: MORE THAN THREE TIMES A WEEK
HOW OFTEN DO YOU HAVE SIX OR MORE DRINKS ON ONE OCCASION: NEVER
HOW OFTEN DO YOU ATTEND CHURCH OR RELIGIOUS SERVICES?: MORE THAN 4 TIMES PER YEAR

## 2022-05-10 ASSESSMENT — ACTIVITIES OF DAILY LIVING (ADL): BATHING_REQUIRES_ASSISTANCE: 0

## 2022-05-10 ASSESSMENT — LIFESTYLE VARIABLES
HOW OFTEN DO YOU HAVE SIX OR MORE DRINKS ON ONE OCCASION: NEVER
HOW OFTEN DO YOU HAVE A DRINK CONTAINING ALCOHOL: NEVER

## 2022-05-10 ASSESSMENT — ENCOUNTER SYMPTOMS: GENERAL WELL-BEING: GOOD

## 2022-05-10 ASSESSMENT — FIBROSIS 4 INDEX: FIB4 SCORE: 2.63

## 2022-05-10 ASSESSMENT — PATIENT HEALTH QUESTIONNAIRE - PHQ9: CLINICAL INTERPRETATION OF PHQ2 SCORE: 0

## 2022-05-10 NOTE — ASSESSMENT & PLAN NOTE
Chronic problem. Diagnosed with Dr. Seth and doctor at [facility] in the past.   Uses eye drops and special mouth wash

## 2022-05-10 NOTE — ASSESSMENT & PLAN NOTE
Has endoscopy and colonoscpy scheduled for June 20th.   Currently getting cardiac clearance with her cardiologist.   Currently on hyocyamine and pepcid.

## 2022-05-10 NOTE — PROGRESS NOTES
Chief Complaint   Patient presents with   • Medicare Annual Wellness   • Medication Refill     triamterene         HPI:  Mariama is a 81 y.o. here for Medicare Annual Wellness Visit    Sicca complex (HCC)  Chronic problem. Diagnosed with Dr. Seth and doctor at [facility] in the past.   Uses eye drops and special mouth wash    Vitamin D deficiency  chornic problem. Remains on supplement.     Seasonal allergies  Chronic problem. Currently on zyrtec and flonase, symptoms moderately well controlled.     Recurrent major depressive disorder, in partial remission (HCC)  Chronic problem. Currently on fluvoxamine. Often forgets though. Followed by psychiatry.     Reactive airway disease without complication  Chronic problem. Uses albuterol as needed.     Pure hypercholesterolemia  Chronic problem. LDL improving. ASCVD risk not applicable.      Other osteoporosis without current pathological fracture  Chronic problem. Unfortunately unable to tolerate bisphosphnate or ijection medication in the past.     Migraine without aura  Chronic problem.   Takes tylenol, advil, and lays down in a dark room.   Had botox injections in the past but not helpful.   Occurring about 5 times per month.   Was on topamax in the past, possibly a little helpful.     Lupus (systemic lupus erythematosus)  Long history of SLE. Followed by Dr. Bernal here at [facility].     Lichen planus  Part of her SICCA. In her mouth. Uses special mouth wash. Stable.     Dizziness  Cardiac etiology mostly ruled out by her cardiologist.   Possibly due to anxiety.     Gtz's esophagus without dysplasia  Has endoscopy and colonoscpy scheduled for June 20th.   Currently getting cardiac clearance with her cardiologist.   Currently on hyocyamine and pepcid.     Aortic valve disease  Echo done 4/29/22, stable.   Followed closely by Dr. Bolanos.       Patient Active Problem List    Diagnosis Date Noted   • Mitral valve prolapse 05/03/2022   • Primary osteoarthritis  involving multiple joints 04/22/2022   • Polyarthralgia 04/04/2022   • Seasonal allergies 06/10/2021   • Dizziness 09/24/2019   • Pure hypercholesterolemia 12/20/2018   • Secondary osteoarthritis 09/12/2018   • Recurrent major depressive disorder, in partial remission (HCC) 06/26/2018   • Fibromyalgia 05/17/2018   • Other osteoporosis without current pathological fracture 11/03/2017   • Vitamin D deficiency 11/03/2017   • Gtz's esophagus without dysplasia 06/29/2017   • Reactive airway disease without complication 06/29/2017   • Migraine without aura    • Lupus (systemic lupus erythematosus) ([facility]) 07/14/2011   • Sicca complex ([facility]) 07/14/2011   • Aortic valve disease 07/14/2011   • Lichen planus 07/14/2011       Current Outpatient Medications   Medication Sig Dispense Refill   • PEG 3350-KCl-NaBcb-NaCl-NaSulf (PEG-3350/ELECTROLYTES) 236 g Recon Soln      • hyoscyamine (LEVSIN) 0.125 MG tablet Take 0.125 mg by mouth.     • triamterene/hctz (MAXZIDE-25/DYAZIDE) 37.5-25 MG Cap Take 1 Capsule by mouth 1 time a day as needed (swelling). 30 Capsule 2   • famotidine (PEPCID) 20 MG Tab      • ibuprofen (MOTRIN) 600 MG Tab Take 600 mg by mouth every 6 hours as needed.     • ALPRAZolam (XANAX) 0.25 MG Tab      • azelastine (ASTELIN) 137 MCG/SPRAY nasal spray Administer 1 Spray into affected nostril(S) 2 times a day. 30 mL 2   • diazePAM (VALIUM) 5 MG Tab Taking 2.5 at night PRN     • albuterol 108 (90 Base) MCG/ACT Aero Soln inhalation aerosol Inhale 2 Puffs by mouth every 6 hours as needed for Shortness of Breath. 8.5 g 2   • vitamin D, Ergocalciferol, (DRISDOL) 59418 units Cap capsule Take  by mouth every 7 days.     • Fluvoxamine Maleate 100 MG CAPSULE SR 24 HR Take 100 mg by mouth every bedtime.     • Turmeric, Curcuma Longa, (CURCUMIN) Powder by Does not apply route.     • Hyoscyamine Sulfate (LEVSIN PO) Take  by mouth as needed.     • BIOTIN 5000 PO Take  by mouth every day.     • Multiple Vitamin (MULTIVITAMIN PO)  Take  by mouth every day.     • naproxen (NAPROSYN) 375 MG Tab Take 375 mg by mouth 2 times a day, with meals.       No current facility-administered medications for this visit.        Patient is taking medications as noted in medication list.  Current supplements as per medication list.     Allergies: Ciprofloxacin, Other drug, and Sulfa drugs    Current social contact/activities: Family    Is patient current with immunizations? No, due for SHINGRIX (Shingles). Patient is interested in receiving NONE today.    She  reports that she quit smoking about 44 years ago. She quit after 34.00 years of use. She quit smokeless tobacco use about 43 years ago. She reports current alcohol use. She reports that she does not use drugs.  Counseling given: Not Answered        DPA/Advanced directive: Patient has Advanced Directive on file.     ROS:    Gait: Uses no assistive device   Ostomy: No   Other tubes: No   Amputations: No   Chronic oxygen use No   Last eye exam N/A  Wears hearing aids: No   : Denies any urinary leakage during the last 6 months      Screening:    DEPRESSION     Is patient seeing a counselor, psychiatrist or other healthcare provider regarding their mental health? No, and not interested.     Depression Screen (PHQ-2/PHQ-9) 6/10/2021 4/4/2022 5/10/2022   PHQ-2 Total Score 0 - -   PHQ-2 Total Score - 0 0   PHQ-9 Total Score 0 - -   PHQ-9 Total Score - - -       Interpretation of PHQ-9 Total Score   Score Severity   1-4 No Depression   5-9 Mild Depression   10-14 Moderate Depression   15-19 Moderately Severe Depression   20-27 Severe Depression    Depression Screening  Little interest or pleasure in doing things?  0 - not at all  Feeling down, depressed, or hopeless? 0 - not at all  Patient Health Questionnaire Score: 0    If depressive symptoms identified deferred to follow up visit unless specifically addressed in assessment and plan.    Interpretation of PHQ-9 Total Score   Score Severity   1-4 No Depression    5-9 Mild Depression   10-14 Moderate Depression   15-19 Moderately Severe Depression   20-27 Severe Depression    Screening for Cognitive Impairment  Three Minute Recall (daughter, heaven, mountain)  3/3    Eduin clock face with all 12 numbers and set the hands to show 10 past 11.  Yes    If cognitive concerns identified, deferred for follow up unless specifically addressed in assessment and plan.    Fall Risk Assessment  Has the patient had two or more falls in the last year or any fall with injury in the last year?  No  If fall risk identified, deferred for follow up unless specifically addressed in assessment and plan.    Safety Assessment  Throw rugs on floor.  Yes  Handrails on all stairs.  Yes  Good lighting in all hallways.  Yes  Difficulty hearing.  No  Patient counseled about all safety risks that were identified.    Functional Assessment ADLs  Are there any barriers preventing you from cooking for yourself or meeting nutritional needs?  No.    Are there any barriers preventing you from driving safely or obtaining transportation?  No.    Are there any barriers preventing you from using a telephone or calling for help?  No.    Are there any barriers preventing you from shopping?  No.    Are there any barriers preventing you from taking care of your own finances?  No.    Are there any barriers preventing you from managing your medications?  Yes. Pt's son helps   Are there any barriers preventing you from showering, bathing or dressing yourself?  No.    Are you currently engaging in any exercise or physical activity?  No.     What is your perception of your health?  Good.    Health Maintenance Summary          Overdue - Annual Wellness Visit (Once) Overdue - never done    No completion history exists for this topic.          Overdue - IMM DTaP/Tdap/Td Vaccine (1 - Tdap) Overdue since 8/7/2010 08/06/2010  Imm Admin: TD Vaccine    04/22/2006  Imm Admin: TD Vaccine          Overdue - IMM ZOSTER VACCINES (2  of 3) Overdue since 2/29/2012 01/04/2012  Imm Admin: Zoster Vaccine Live (ZVL) (Zostavax) - HISTORICAL DATA          BONE DENSITY (Every 5 Years) Tentatively due on 3/8/2024    03/08/2019  DS-BONE DENSITY STUDY (DEXA)    12/03/2010  DS-BONE DENSITY STUDY (DEXA)          IMM HEP B VACCINE (Series Information) Aged Out    12/16/2013  Imm Admin: Hep A/HEP B Combined Vaccine (TwinRix)    11/16/2013  Imm Admin: Hep A/HEP B Combined Vaccine (TwinRix)    02/28/2012  Imm Admin: Hepatitis B Vaccine (Adol/Adult)          IMM PNEUMOCOCCAL VACCINE: 65+ Years (Series Information) Completed    11/24/2015  Imm Admin: Pneumococcal Conjugate Vaccine (Prevnar/PCV-13)    12/28/2012  Imm Admin: Pneumococcal polysaccharide vaccine (PPSV-23)          COVID-19 Vaccine (Series Information) Completed    08/30/2021  Imm Admin: PFIZER PURPLE CAP SARS-COV-2 VACCINATION (12+)    02/04/2021  Imm Admin: Pfizer SARS-CoV-2 Vaccine    01/14/2021  Imm Admin: Pfizer SARS-CoV-2 Vaccine          IMM INFLUENZA (Series Information) Completed    09/18/2021  Imm Admin: Influenza Vaccine Adult HD    09/11/2021  Imm Admin: Influenza, Unspecified - HISTORICAL DATA    08/25/2020  Imm Admin: Influenza Vaccine Quad Inj (Pf)    09/14/2019  Imm Admin: Influenza Vaccine Adult HD    09/12/2018  Imm Admin: Influenza Vaccine Adult HD    Only the first 5 history entries have been loaded, but more history exists.          IMM MENINGOCOCCAL VACCINE (MCV4) (Series Information) Aged Out    No completion history exists for this topic.          Discontinued - MAMMOGRAM  Discontinued    03/08/2019  MA-SCREEN MAMMO W/CAD-BILAT    08/26/2013  MA-BILAT DIAGNOSTIC MAMMO W/CAD    06/29/2012  MA-SCREENING MAMMOGRAM W/ CAD    12/03/2010  MA-SCREEING MAMMOGRAM W/ CAD    08/11/2009  MA-SCREENING DIGITAL MAMMO    Only the first 5 history entries have been loaded, but more history exists.          Discontinued - PAP SMEAR  Discontinued    No completion history exists for this topic.           Discontinued - COLORECTAL CANCER SCREENING  Discontinued    No completion history exists for this topic.                Patient Care Team:  Verona Goddard M.D. as PCP - General (Family Medicine)  Carlos Horton M.D. as Consulting Physician (Dermatology)  Olga Garrett M.D. as Consulting Physician (Internal Medicine)    Social History     Tobacco Use   • Smoking status: Former Smoker     Years: 34.00     Quit date: 1978     Years since quittin.3   • Smokeless tobacco: Former User     Quit date: 1978   Vaping Use   • Vaping Use: Never used   Substance Use Topics   • Alcohol use: Yes     Comment: Rarely   • Drug use: No     Family History   Problem Relation Age of Onset   • Arthritis Mother    • Cancer Father    • Heart Disease Father    • Hypertension Father    • Diabetes Maternal Aunt    • Cancer Maternal Uncle    • Heart Disease Maternal Grandmother    • Diabetes Maternal Grandfather    • Genetic Disorder Paternal Grandmother    • Heart Disease Paternal Grandfather    • Hyperlipidemia Paternal Grandfather    • Diabetes Maternal Aunt    • Genetic Disorder Maternal Uncle    • Cancer Paternal Aunt    • Cancer Paternal Aunt    • Cancer Paternal Uncle      She  has a past medical history of Allergy, unspecified not elsewhere classified, Anxiety, Arthritis, Blood transfusion, without reported diagnosis, CATARACT, Dental disorder, Depression, Fibromyalgia, GERD (gastroesophageal reflux disease), GERD (gastroesophageal reflux disease), Headache(784.0), Headache, classical migraine, Kidney calculi, Lichen planus, Lupus (HCC), Migraine without aura, without mention of intractable migraine without mention of status migrainosus, Mitral valve prolapse, Osteoporosis, unspecified, Other convulsions, Pain, Psychiatric problem, Shingles (10/4/11), Sjogren's syndrome (HCC), Snoring, Unspecified asthma(493.90), and Urinary tract infection, site not specified.   Past Surgical History:   Procedure  "Laterality Date   • HIP CANNULATED SCREW  1/28/2014    Performed by Cristian Delgado M.D. at SURGERY [facility]   • CLOSED REDUCTION  8/6/2012    Performed by CRISTIAN DELGADO at SURGERY [facility]   • HIP CANNULATED SCREW  8/6/2012    Performed by CRISTIAN DELGADO at SURGERY [facility]   • SHOULDER DECOMPRESSION ARTHROSCOPIC  10/6/2011    Performed by LARY CAR at [facility]   • CLAVICLE DISTAL EXCISION  10/6/2011    Performed by LARY CAR at SURGERY [facility]   • OTHER  2007    reconstruction of shoulder to cover chest wall   • MAMMOPLASTY AUGMENTATION  1988    removed, post op complications   • OTHER  1988    17 reconstruction surgeries post mammoplasty   • MASTECTOMY  1972    b/l   • BREAST BIOPSY  1970's    x 5   • ABDOMINAL EXPLORATION     • EYE SURGERY     • OPEN REDUCTION             Exam:     /64 (BP Location: Left arm, Patient Position: Sitting, BP Cuff Size: Adult long)   Pulse 71   Temp 36.7 °C (98 °F) (Temporal)   Ht 1.626 m (5' 4\")   Wt 58.1 kg (128 lb)   SpO2 98%  Body mass index is 21.97 kg/m².    Hearing good.    Dentition good  Alert, oriented in no acute distress.  Eye contact is good, speech goal directed, affect calm      Assessment and Plan. The following treatment and monitoring plan is recommended:    1. Sicca complex (HCC)     2. Vitamin D deficiency     3. Seasonal allergies     4. Recurrent major depressive disorder, in partial remission ([facility])     5. Mild persistent reactive airway disease without complication     6. Pure hypercholesterolemia     7. Other osteoporosis without current pathological fracture     8. Migraine without aura and without status migrainosus, not intractable     9. Other forms of systemic lupus erythematosus, unspecified organ involvement status ([facility])     10. Lichen planus     11. Dizziness     12. Gtz's esophagus without dysplasia     13. Aortic valve disease     14. Essential hypertension  " triamterene/hctz (MAXZIDE-25/DYAZIDE) 37.5-25 MG Cap   15. Lower extremity edema  triamterene/hctz (MAXZIDE-25/DYAZIDE) 37.5-25 MG Cap     The patient is overall doing well.  She is struggling with her GI issues recently but is followed by gastroenterology.  She is scheduled for colonoscopy and endoscopy next month.  She is followed closely by cardiology and rheumatology.  Up-to-date on preventive health screenings.    Services suggested: No services needed at this time  Health Care Screening recommendations as per orders if indicated.  Referrals offered: PT/OT/Nutrition counseling/Behavioral Health/Smoking cessation as per orders if indicated.    Discussion today about general wellness and lifestyle habits:    · Prevent falls and reduce trip hazards; Cautioned about securing or removing rugs.  · Have a working fire alarm and carbon monoxide detector;   · Engage in regular physical activity and social activities       Follow-up: No follow-ups on file.

## 2022-05-10 NOTE — ASSESSMENT & PLAN NOTE
Chronic problem. Unfortunately unable to tolerate bisphosphnate or ijection medication in the past.

## 2022-05-10 NOTE — ASSESSMENT & PLAN NOTE
Chronic problem.   Takes tylenol, advil, and lays down in a dark room.   Had botox injections in the past but not helpful.   Occurring about 5 times per month.   Was on topamax in the past, possibly a little helpful.

## 2022-05-20 ENCOUNTER — TELEPHONE (OUTPATIENT)
Dept: RHEUMATOLOGY | Facility: MEDICAL CENTER | Age: 82
End: 2022-05-20
Payer: MEDICARE

## 2022-05-20 NOTE — TELEPHONE ENCOUNTER
Patient called and left a message , she needs a call back in regards to lab results. Patient said she was told she would receive a call explaining results that she has seen are final in Epic and did not receive a call. Thank you    Other Other/None Known

## 2022-05-21 NOTE — TELEPHONE ENCOUNTER
Per information on the after visit summary, I only call patients when there are significant lab abnormalities of clinical importance that need to be acted upon. Since she did not hear from me, that means there is no cause for alarm and we can discuss more at her follow-up visit.    Benji Bernal M.D.

## 2022-05-23 NOTE — TELEPHONE ENCOUNTER
Called patient and left a detailed message relaying Dr Bernal's message. Asked to call us back if she has further questions or send Dr Bernal a myChart message.

## 2022-06-07 ENCOUNTER — TELEPHONE (OUTPATIENT)
Dept: RHEUMATOLOGY | Facility: MEDICAL CENTER | Age: 82
End: 2022-06-07
Payer: MEDICARE

## 2022-06-16 ENCOUNTER — APPOINTMENT (RX ONLY)
Dept: URBAN - METROPOLITAN AREA CLINIC 20 | Facility: CLINIC | Age: 82
Setting detail: DERMATOLOGY
End: 2022-06-16

## 2022-06-16 DIAGNOSIS — D18.0 HEMANGIOMA: ICD-10-CM

## 2022-06-16 DIAGNOSIS — D22 MELANOCYTIC NEVI: ICD-10-CM

## 2022-06-16 DIAGNOSIS — L82.0 INFLAMED SEBORRHEIC KERATOSIS: ICD-10-CM

## 2022-06-16 DIAGNOSIS — L81.4 OTHER MELANIN HYPERPIGMENTATION: ICD-10-CM

## 2022-06-16 DIAGNOSIS — L82.1 OTHER SEBORRHEIC KERATOSIS: ICD-10-CM

## 2022-06-16 DIAGNOSIS — Z85.828 PERSONAL HISTORY OF OTHER MALIGNANT NEOPLASM OF SKIN: ICD-10-CM

## 2022-06-16 DIAGNOSIS — Z71.89 OTHER SPECIFIED COUNSELING: ICD-10-CM

## 2022-06-16 PROBLEM — D22.62 MELANOCYTIC NEVI OF LEFT UPPER LIMB, INCLUDING SHOULDER: Status: ACTIVE | Noted: 2022-06-16

## 2022-06-16 PROBLEM — D22.5 MELANOCYTIC NEVI OF TRUNK: Status: ACTIVE | Noted: 2022-06-16

## 2022-06-16 PROBLEM — D22.61 MELANOCYTIC NEVI OF RIGHT UPPER LIMB, INCLUDING SHOULDER: Status: ACTIVE | Noted: 2022-06-16

## 2022-06-16 PROBLEM — D22.71 MELANOCYTIC NEVI OF RIGHT LOWER LIMB, INCLUDING HIP: Status: ACTIVE | Noted: 2022-06-16

## 2022-06-16 PROBLEM — D22.72 MELANOCYTIC NEVI OF LEFT LOWER LIMB, INCLUDING HIP: Status: ACTIVE | Noted: 2022-06-16

## 2022-06-16 PROBLEM — D18.01 HEMANGIOMA OF SKIN AND SUBCUTANEOUS TISSUE: Status: ACTIVE | Noted: 2022-06-16

## 2022-06-16 PROCEDURE — 17110 DESTRUCTION B9 LES UP TO 14: CPT

## 2022-06-16 PROCEDURE — ? LIQUID NITROGEN

## 2022-06-16 PROCEDURE — 99213 OFFICE O/P EST LOW 20 MIN: CPT | Mod: 25

## 2022-06-16 PROCEDURE — ? COUNSELING

## 2022-06-16 ASSESSMENT — LOCATION SIMPLE DESCRIPTION DERM
LOCATION SIMPLE: RIGHT THIGH
LOCATION SIMPLE: ABDOMEN
LOCATION SIMPLE: RIGHT POSTERIOR THIGH
LOCATION SIMPLE: LEFT PRETIBIAL REGION
LOCATION SIMPLE: CHEST
LOCATION SIMPLE: LEFT LOWER BACK
LOCATION SIMPLE: RIGHT UPPER BACK
LOCATION SIMPLE: RIGHT FOREARM
LOCATION SIMPLE: RIGHT UPPER ARM
LOCATION SIMPLE: LEFT ANTERIOR NECK
LOCATION SIMPLE: LEFT FOREARM
LOCATION SIMPLE: RIGHT PRETIBIAL REGION
LOCATION SIMPLE: LEFT UPPER BACK
LOCATION SIMPLE: LEFT UPPER ARM
LOCATION SIMPLE: LEFT POSTERIOR THIGH
LOCATION SIMPLE: LEFT CHEEK
LOCATION SIMPLE: LEFT THIGH

## 2022-06-16 ASSESSMENT — LOCATION DETAILED DESCRIPTION DERM
LOCATION DETAILED: LEFT DISTAL POSTERIOR THIGH
LOCATION DETAILED: EPIGASTRIC SKIN
LOCATION DETAILED: LEFT MEDIAL SUPERIOR CHEST
LOCATION DETAILED: LEFT DISTAL PRETIBIAL REGION
LOCATION DETAILED: LEFT SUPERIOR MEDIAL MIDBACK
LOCATION DETAILED: RIGHT VENTRAL DISTAL FOREARM
LOCATION DETAILED: LEFT INFERIOR LATERAL NECK
LOCATION DETAILED: RIGHT PROXIMAL POSTERIOR THIGH
LOCATION DETAILED: LEFT MID-UPPER BACK
LOCATION DETAILED: LEFT VENTRAL PROXIMAL FOREARM
LOCATION DETAILED: RIGHT PROXIMAL DORSAL FOREARM
LOCATION DETAILED: RIGHT DISTAL POSTERIOR THIGH
LOCATION DETAILED: LEFT PROXIMAL POSTERIOR THIGH
LOCATION DETAILED: LEFT PROXIMAL PRETIBIAL REGION
LOCATION DETAILED: LEFT PROXIMAL DORSAL FOREARM
LOCATION DETAILED: RIGHT PROXIMAL PRETIBIAL REGION
LOCATION DETAILED: LEFT DISTAL DORSAL FOREARM
LOCATION DETAILED: LEFT ANTERIOR DISTAL UPPER ARM
LOCATION DETAILED: LEFT VENTRAL DISTAL FOREARM
LOCATION DETAILED: RIGHT ANTERIOR DISTAL UPPER ARM
LOCATION DETAILED: LEFT INFERIOR CENTRAL MALAR CHEEK
LOCATION DETAILED: RIGHT ANTERIOR DISTAL THIGH
LOCATION DETAILED: LEFT ANTERIOR DISTAL THIGH
LOCATION DETAILED: RIGHT VENTRAL PROXIMAL FOREARM
LOCATION DETAILED: RIGHT INFERIOR MEDIAL UPPER BACK

## 2022-06-16 ASSESSMENT — LOCATION ZONE DERM
LOCATION ZONE: ARM
LOCATION ZONE: NECK
LOCATION ZONE: LEG
LOCATION ZONE: TRUNK
LOCATION ZONE: FACE

## 2022-06-16 NOTE — PROCEDURE: MIPS QUALITY
Quality 111:Pneumonia Vaccination Status For Older Adults: Pneumococcal vaccine administered on or after patient’s 60th birthday and before the end of the measurement period
Quality 226: Preventive Care And Screening: Tobacco Use: Screening And Cessation Intervention: Patient screened for tobacco use and is an ex/non-smoker
Quality 431: Preventive Care And Screening: Unhealthy Alcohol Use - Screening: Patient identified as an unhealthy alcohol user when screened for unhealthy alcohol use using a systematic screening method and received brief counseling
Detail Level: Detailed
Quality 130: Documentation Of Current Medications In The Medical Record: Current Medications Documented

## 2022-06-16 NOTE — PROCEDURE: LIQUID NITROGEN
Add 52 Modifier (Optional): no
Medical Necessity Information: It is in your best interest to select a reason for this procedure from the list below. All of these items fulfill various CMS LCD requirements except the new and changing color options.
Detail Level: Detailed
Show Applicator Variable?: Yes
Medical Necessity Clause: This procedure was medically necessary because the lesions that were treated were:
Spray Paint Text: The liquid nitrogen was applied to the skin utilizing a spray paint frosting technique.
Consent: The patient's consent was obtained including but not limited to risks of crusting, scabbing, blistering, scarring, darker or lighter pigmentary change, recurrence, incomplete removal and infection.
Post-Care Instructions: I reviewed with the patient in detail post-care instructions. Patient is to wear sunprotection, and avoid picking at any of the treated lesions. Pt may apply Vaseline to crusted or scabbing areas.

## 2022-11-09 ENCOUNTER — PATIENT MESSAGE (OUTPATIENT)
Dept: HEALTH INFORMATION MANAGEMENT | Facility: OTHER | Age: 82
End: 2022-11-09

## 2022-12-08 ENCOUNTER — APPOINTMENT (OUTPATIENT)
Dept: MEDICAL GROUP | Facility: MEDICAL CENTER | Age: 82
End: 2022-12-08
Payer: MEDICARE

## 2023-01-09 ENCOUNTER — APPOINTMENT (OUTPATIENT)
Dept: MEDICAL GROUP | Facility: MEDICAL CENTER | Age: 83
End: 2023-01-09
Payer: MEDICARE

## 2023-01-19 ENCOUNTER — APPOINTMENT (OUTPATIENT)
Dept: MEDICAL GROUP | Facility: MEDICAL CENTER | Age: 83
End: 2023-01-19
Payer: MEDICARE

## 2023-01-19 RX ORDER — ROSUVASTATIN CALCIUM 5 MG/1
TABLET, COATED ORAL
COMMUNITY
Start: 2022-12-09 | End: 2023-03-16

## 2023-03-09 ENCOUNTER — APPOINTMENT (OUTPATIENT)
Dept: MEDICAL GROUP | Facility: MEDICAL CENTER | Age: 83
End: 2023-03-09
Payer: MEDICARE

## 2023-03-16 ENCOUNTER — HOSPITAL ENCOUNTER (OUTPATIENT)
Facility: MEDICAL CENTER | Age: 83
End: 2023-03-16
Attending: FAMILY MEDICINE
Payer: MEDICARE

## 2023-03-16 ENCOUNTER — OFFICE VISIT (OUTPATIENT)
Dept: MEDICAL GROUP | Facility: MEDICAL CENTER | Age: 83
End: 2023-03-16
Payer: MEDICARE

## 2023-03-16 VITALS
OXYGEN SATURATION: 99 % | HEART RATE: 68 BPM | WEIGHT: 116 LBS | HEIGHT: 64 IN | RESPIRATION RATE: 18 BRPM | DIASTOLIC BLOOD PRESSURE: 72 MMHG | SYSTOLIC BLOOD PRESSURE: 132 MMHG | BODY MASS INDEX: 19.81 KG/M2 | TEMPERATURE: 96.9 F

## 2023-03-16 DIAGNOSIS — M32.8 OTHER FORMS OF SYSTEMIC LUPUS ERYTHEMATOSUS, UNSPECIFIED ORGAN INVOLVEMENT STATUS (HCC): ICD-10-CM

## 2023-03-16 DIAGNOSIS — R53.83 OTHER FATIGUE: ICD-10-CM

## 2023-03-16 DIAGNOSIS — M35.00 SICCA COMPLEX (HCC): ICD-10-CM

## 2023-03-16 DIAGNOSIS — R63.4 WEIGHT LOSS: ICD-10-CM

## 2023-03-16 DIAGNOSIS — K22.70 BARRETT'S ESOPHAGUS WITHOUT DYSPLASIA: ICD-10-CM

## 2023-03-16 DIAGNOSIS — I35.9 AORTIC VALVE DISEASE: ICD-10-CM

## 2023-03-16 DIAGNOSIS — E78.00 PURE HYPERCHOLESTEROLEMIA: ICD-10-CM

## 2023-03-16 PROCEDURE — 81001 URINALYSIS AUTO W/SCOPE: CPT

## 2023-03-16 PROCEDURE — 87077 CULTURE AEROBIC IDENTIFY: CPT | Mod: 91

## 2023-03-16 PROCEDURE — 87186 SC STD MICRODIL/AGAR DIL: CPT | Mod: 91

## 2023-03-16 PROCEDURE — 87086 URINE CULTURE/COLONY COUNT: CPT

## 2023-03-16 PROCEDURE — 99214 OFFICE O/P EST MOD 30 MIN: CPT | Performed by: FAMILY MEDICINE

## 2023-03-16 RX ORDER — NIRMATRELVIR AND RITONAVIR 150-100 MG
KIT ORAL
COMMUNITY
Start: 2023-02-22 | End: 2023-04-14

## 2023-03-16 ASSESSMENT — PATIENT HEALTH QUESTIONNAIRE - PHQ9
3. TROUBLE FALLING OR STAYING ASLEEP OR SLEEPING TOO MUCH: NOT AT ALL
SUM OF ALL RESPONSES TO PHQ9 QUESTIONS 1 AND 2: 2
4. FEELING TIRED OR HAVING LITTLE ENERGY: NOT AT ALL
7. TROUBLE CONCENTRATING ON THINGS, SUCH AS READING THE NEWSPAPER OR WATCHING TELEVISION: NOT AT ALL
SUM OF ALL RESPONSES TO PHQ QUESTIONS 1-9: 2
2. FEELING DOWN, DEPRESSED, IRRITABLE, OR HOPELESS: SEVERAL DAYS
3. TROUBLE FALLING OR STAYING ASLEEP OR SLEEPING TOO MUCH: NOT AT ALL
6. FEELING BAD ABOUT YOURSELF - OR THAT YOU ARE A FAILURE OR HAVE LET YOURSELF OR YOUR FAMILY DOWN: NOT AL ALL
1. LITTLE INTEREST OR PLEASURE IN DOING THINGS: SEVERAL DAYS
5. POOR APPETITE OR OVEREATING: NOT AT ALL
2. FEELING DOWN, DEPRESSED, IRRITABLE, OR HOPELESS: SEVERAL DAYS
8. MOVING OR SPEAKING SO SLOWLY THAT OTHER PEOPLE COULD HAVE NOTICED. OR THE OPPOSITE, BEING SO FIGETY OR RESTLESS THAT YOU HAVE BEEN MOVING AROUND A LOT MORE THAN USUAL: NOT AT ALL
8. MOVING OR SPEAKING SO SLOWLY THAT OTHER PEOPLE COULD HAVE NOTICED. OR THE OPPOSITE, BEING SO FIGETY OR RESTLESS THAT YOU HAVE BEEN MOVING AROUND A LOT MORE THAN USUAL: NOT AT ALL
5. POOR APPETITE OR OVEREATING: NOT AT ALL
7. TROUBLE CONCENTRATING ON THINGS, SUCH AS READING THE NEWSPAPER OR WATCHING TELEVISION: NOT AT ALL
SUM OF ALL RESPONSES TO PHQ9 QUESTIONS 1 AND 2: 2
9. THOUGHTS THAT YOU WOULD BE BETTER OFF DEAD, OR OF HURTING YOURSELF: NOT AT ALL
4. FEELING TIRED OR HAVING LITTLE ENERGY: NOT AT ALL
6. FEELING BAD ABOUT YOURSELF - OR THAT YOU ARE A FAILURE OR HAVE LET YOURSELF OR YOUR FAMILY DOWN: NOT AL ALL
1. LITTLE INTEREST OR PLEASURE IN DOING THINGS: SEVERAL DAYS

## 2023-03-16 ASSESSMENT — FIBROSIS 4 INDEX: FIB4 SCORE: 2.66

## 2023-03-16 NOTE — ASSESSMENT & PLAN NOTE
New problem.   Had gained a fair bit of weight during the pandemic.   Stopped drinking diet gogo  Has had about 10-12 lbs of weight oss, but states this is her baseline.

## 2023-03-16 NOTE — ASSESSMENT & PLAN NOTE
Chronic problem. Long history of SLE. Followed by Dr. Bernal here at [facility]. She is not pleased with her care at this point.

## 2023-03-16 NOTE — PROGRESS NOTES
Subjective:     CC: Diagnoses of Gtz's esophagus without dysplasia, Weight loss, Other forms of systemic lupus erythematosus, unspecified organ involvement status (HCC), Sicca complex (HCC), Aortic valve disease, Pure hypercholesterolemia, and Other fatigue were pertinent to this visit.    HPI: Patient is a 82 y.o. female established patient who presents today for general follow up.       Gtz's esophagus without dysplasia  Chronic problem. Has endoscopy scheduled for April.     Weight loss  New problem.   Had gained a fair bit of weight during the pandemic.   Stopped drinking diet gogo  Has had about 10-12 lbs of weight oss, but states this is her baseline.     Lupus (systemic lupus erythematosus)  Chronic problem. Long history of SLE. Followed by Dr. Bernal here at [facility]. She is not pleased with her care at this point.     Sicca complex (HCC)  Chronic problem. Uses eye drops and special mouth wash.     Aortic valve disease  Echo done in April 2022, stable. Followed by Dr. Bolanos.     Pure hypercholesterolemia  Chronic problem. LDL improving. ASCVD risk not applicable.      Past Medical History:   Diagnosis Date    Allergy, unspecified not elsewhere classified     Anxiety     Arthritis     Blood transfusion, without reported diagnosis     CATARACT     Dental disorder     lichen planus    Depression     Fibromyalgia     GERD (gastroesophageal reflux disease)     GERD (gastroesophageal reflux disease)     Headache(784.0)     Headache, classical migraine     Kidney calculi     Lichen planus     Lupus (HCC)     Migraine without aura, without mention of intractable migraine without mention of status migrainosus     Mitral valve prolapse     Osteoporosis, unspecified     Other convulsions     Isolated seizure March, 2014    Pain     joints, muscles    Psychiatric problem     depression    Shingles 10/4/11    dr velasco aware of this back of head    Sjogren's syndrome (HCC)     Snoring     Unspecified  asthma(493.90)     Urinary tract infection, site not specified        Social History     Tobacco Use    Smoking status: Former     Years: 34.00     Types: Cigarettes     Quit date: 1978     Years since quittin.1    Smokeless tobacco: Former     Quit date: 1978   Vaping Use    Vaping Use: Never used   Substance Use Topics    Alcohol use: Yes     Comment: Rarely    Drug use: No       Current Outpatient Medications Ordered in Epic   Medication Sig Dispense Refill    PAXLOVID, 150/100, 10 x 150 MG & 10 x 100MG Tablet Therapy Pack       PEG 3350-KCl-NaBcb-NaCl-NaSulf (PEG-3350/ELECTROLYTES) 236 g Recon Soln       hyoscyamine (LEVSIN) 0.125 MG tablet Take 0.125 mg by mouth.      triamterene/hctz (MAXZIDE-25/DYAZIDE) 37.5-25 MG Cap Take 1 Capsule by mouth 1 time a day as needed (swelling). 30 Capsule 2    famotidine (PEPCID) 20 MG Tab       ibuprofen (MOTRIN) 600 MG Tab Take 600 mg by mouth every 6 hours as needed.      ALPRAZolam (XANAX) 0.25 MG Tab       azelastine (ASTELIN) 137 MCG/SPRAY nasal spray Administer 1 Spray into affected nostril(S) 2 times a day. 30 mL 2    diazePAM (VALIUM) 5 MG Tab Taking 2.5 at night PRN      albuterol 108 (90 Base) MCG/ACT Aero Soln inhalation aerosol Inhale 2 Puffs by mouth every 6 hours as needed for Shortness of Breath. 8.5 g 2    vitamin D, Ergocalciferol, (DRISDOL) 51229 units Cap capsule Take  by mouth every 7 days.      Fluvoxamine Maleate 100 MG CAPSULE SR 24 HR Take 100 mg by mouth every bedtime.      Turmeric, Curcuma Longa, (CURCUMIN) Powder by Does not apply route.      BIOTIN 5000 PO Take  by mouth every day.      Multiple Vitamin (MULTIVITAMIN PO) Take  by mouth every day.      naproxen (NAPROSYN) 375 MG Tab Take 375 mg by mouth 2 times a day, with meals.       No current Epic-ordered facility-administered medications on file.       Allergies:  Ciprofloxacin, Methotrexate, Other drug, and Sulfa drugs    Health Maintenance: Completed      Objective:  "      Exam:  /72 (BP Location: Left arm, Patient Position: Sitting, BP Cuff Size: Large adult)   Pulse 68   Temp 36.1 °C (96.9 °F) (Temporal)   Resp 18   Ht 1.626 m (5' 4\")   Wt 52.6 kg (116 lb)   SpO2 99%   BMI 19.91 kg/m²  Body mass index is 19.91 kg/m².    General: Normal appearing. No distress.  HEAD: NCAT  EYES: conjunctiva clear, lids without ptosis, pupils equal and reactive to light  Neck:  Normal ROM  Pulmonary: Normal effort. Normal respiratory rate.  Cardiovascular: Well perfused. No LE edema  Neurologic: Grossly normal, no focal deficits  Skin: Warm and dry.  No obvious lesions.  Musculoskeletal: Normal gait and station.   Psych: Normal mood and affect. Alert and oriented x3. Judgment and insight is normal.     Labs: 4/28/22 Results reviewed and discussed with the patient, questions answered.    Assessment & Plan:     82 y.o. female with the following -     1. Gtz's esophagus without dysplasia  Chronic problem. Followed by GI. Up to date on endoscopy.     2. Weight loss  New problem. Patient reports healthier diet. Labs ordered. Likely back at baseline.   - CBC WITHOUT DIFFERENTIAL; Future  - Comp Metabolic Panel; Future    3. Other forms of systemic lupus erythematosus, unspecified organ involvement status (HCC)  Chronic problem. Now followed by rheum at [facility].   Recommend follow up, last seen in April. Wanted to review labs.   - CBC WITHOUT DIFFERENTIAL; Future  - Comp Metabolic Panel; Future  - URINALYSIS,CULTURE IF INDICATED; Future    4. Sicca complex (HCC)  See above    5. Aortic valve disease  Chronic, followed by cards, up to date on echo.     6. Pure hypercholesterolemia  Chronic problem. Last lipid panel in April 2022.   No need for statin.   - Lipid Profile; Future    7. Other fatigue  - CBC WITHOUT DIFFERENTIAL; Future  - TSH WITH REFLEX TO FT4; Future      Return in about 4 weeks (around 4/13/2023) for establish care Dr. BLAKE    Please note that this dictation was created " using voice recognition software. I have made every reasonable attempt to correct obvious errors, but I expect that there are errors of grammar and possibly content that I did not discover before finalizing the note.

## 2023-03-17 LAB
APPEARANCE UR: ABNORMAL
BACTERIA #/AREA URNS HPF: ABNORMAL /HPF
BILIRUB UR QL STRIP.AUTO: NEGATIVE
COLOR UR: YELLOW
EPI CELLS #/AREA URNS HPF: ABNORMAL /HPF
GLUCOSE UR STRIP.AUTO-MCNC: NEGATIVE MG/DL
HYALINE CASTS #/AREA URNS LPF: ABNORMAL /LPF
KETONES UR STRIP.AUTO-MCNC: ABNORMAL MG/DL
LEUKOCYTE ESTERASE UR QL STRIP.AUTO: ABNORMAL
MICRO URNS: ABNORMAL
NITRITE UR QL STRIP.AUTO: POSITIVE
PH UR STRIP.AUTO: 6 [PH] (ref 5–8)
PROT UR QL STRIP: NEGATIVE MG/DL
RBC # URNS HPF: ABNORMAL /HPF
RBC UR QL AUTO: NEGATIVE
SP GR UR STRIP.AUTO: 1.01
UROBILINOGEN UR STRIP.AUTO-MCNC: 0.2 MG/DL
WBC #/AREA URNS HPF: ABNORMAL /HPF

## 2023-03-19 ENCOUNTER — PATIENT MESSAGE (OUTPATIENT)
Dept: MEDICAL GROUP | Facility: MEDICAL CENTER | Age: 83
End: 2023-03-19
Payer: MEDICARE

## 2023-03-19 DIAGNOSIS — N30.00 ACUTE CYSTITIS WITHOUT HEMATURIA: ICD-10-CM

## 2023-03-19 LAB
BACTERIA UR CULT: ABNORMAL
SIGNIFICANT IND 70042: ABNORMAL
SITE SITE: ABNORMAL
SOURCE SOURCE: ABNORMAL

## 2023-03-20 RX ORDER — CEPHALEXIN 500 MG/1
500 CAPSULE ORAL 3 TIMES DAILY
Qty: 15 CAPSULE | Refills: 0 | Status: SHIPPED | OUTPATIENT
Start: 2023-03-20 | End: 2023-03-25

## 2023-04-14 ENCOUNTER — OFFICE VISIT (OUTPATIENT)
Dept: MEDICAL GROUP | Facility: MEDICAL CENTER | Age: 83
End: 2023-04-14
Payer: MEDICARE

## 2023-04-14 VITALS
HEART RATE: 78 BPM | TEMPERATURE: 98.8 F | BODY MASS INDEX: 19.39 KG/M2 | SYSTOLIC BLOOD PRESSURE: 100 MMHG | OXYGEN SATURATION: 96 % | RESPIRATION RATE: 16 BRPM | DIASTOLIC BLOOD PRESSURE: 64 MMHG | HEIGHT: 64 IN | WEIGHT: 113.6 LBS

## 2023-04-14 DIAGNOSIS — Z00.00 HEALTHCARE MAINTENANCE: ICD-10-CM

## 2023-04-14 DIAGNOSIS — E78.00 PURE HYPERCHOLESTEROLEMIA: ICD-10-CM

## 2023-04-14 DIAGNOSIS — J45.20 MILD INTERMITTENT REACTIVE AIRWAY DISEASE WITHOUT COMPLICATION: ICD-10-CM

## 2023-04-14 PROCEDURE — 99214 OFFICE O/P EST MOD 30 MIN: CPT | Performed by: STUDENT IN AN ORGANIZED HEALTH CARE EDUCATION/TRAINING PROGRAM

## 2023-04-14 RX ORDER — ALBUTEROL SULFATE 90 UG/1
2 AEROSOL, METERED RESPIRATORY (INHALATION) EVERY 6 HOURS PRN
Qty: 8.5 G | Refills: 2 | Status: SHIPPED | OUTPATIENT
Start: 2023-04-14

## 2023-04-14 ASSESSMENT — ENCOUNTER SYMPTOMS
NAUSEA: 0
FEVER: 0
SHORTNESS OF BREATH: 0
VOMITING: 0
CHILLS: 0
COUGH: 1
ABDOMINAL PAIN: 0
FOCAL WEAKNESS: 0
INSOMNIA: 1
HEARTBURN: 1

## 2023-04-14 ASSESSMENT — FIBROSIS 4 INDEX: FIB4 SCORE: 2.66

## 2023-04-14 NOTE — PROGRESS NOTES
"Subjective:     CC: establish care    HPI:   Mariama presents today with    Problem   Pure Hypercholesterolemia     Latest Reference Range & Units 06/12/21 09:33 04/09/22 08:49   Cholesterol,Tot 100 - 199 mg/dL 229 (H) 224 (H)   Triglycerides 0 - 149 mg/dL 78 91   HDL >=40 mg/dL 63 71   LDL <100 mg/dL 150 (H) 135 (H)   (H): Data is abnormally high         ROS:  Review of Systems   Constitutional:  Negative for chills and fever.   Respiratory:  Positive for cough. Negative for shortness of breath.    Cardiovascular:  Negative for chest pain and leg swelling.   Gastrointestinal:  Positive for heartburn. Negative for abdominal pain, nausea and vomiting.   Neurological:  Negative for focal weakness.   Psychiatric/Behavioral:  The patient has insomnia.      Objective:     Exam:  /64 (BP Location: Left arm, Patient Position: Sitting)   Pulse 78   Temp 37.1 °C (98.8 °F) (Temporal)   Resp 16   Ht 1.626 m (5' 4\")   Wt 51.5 kg (113 lb 9.6 oz)   SpO2 96%   BMI 19.50 kg/m²  Body mass index is 19.5 kg/m².    Physical Exam  Vitals reviewed.   HENT:      Head: Normocephalic.      Mouth/Throat:      Mouth: Mucous membranes are moist.      Pharynx: Oropharynx is clear.   Eyes:      Pupils: Pupils are equal, round, and reactive to light.   Cardiovascular:      Rate and Rhythm: Normal rate and regular rhythm.   Pulmonary:      Effort: Pulmonary effort is normal. No respiratory distress.      Breath sounds: No wheezing or rales.   Abdominal:      General: Abdomen is flat. Bowel sounds are normal. There is no distension.      Tenderness: There is no abdominal tenderness. There is no guarding.   Skin:     General: Skin is warm.   Neurological:      General: No focal deficit present.      Mental Status: She is alert and oriented to person, place, and time.     Labs: scanned, reviewed    Assessment & Plan:     82 y.o. female with the following -     1. Mild intermittent reactive airway disease without complication  Chronic, " stable  - albuterol 108 (90 Base) MCG/ACT Aero Soln inhalation aerosol; Inhale 2 Puffs every 6 hours as needed for Shortness of Breath.  Dispense: 8.5 g; Refill: 2    2. Pure hypercholesterolemia  Chronic, not on medications. Not tolerated crestor and lipitor in the past, caused joint pain.  Discussed lifestyle modifications, including healthy diet and regular exercises.    3. Healthcare maintenance  H/o hypothyroidism. Reports increased hair loss recently  - TSH WITH REFLEX TO FT4; Future        Return in about 4 weeks (around 5/12/2023).    Please note that this dictation was created using voice recognition software. I have made every reasonable attempt to correct obvious errors, but I expect that there are errors of grammar and possibly content that I did not discover before finalizing the note.

## 2023-05-05 ENCOUNTER — OFFICE VISIT (OUTPATIENT)
Dept: MEDICAL GROUP | Facility: MEDICAL CENTER | Age: 83
End: 2023-05-05
Payer: MEDICARE

## 2023-05-05 VITALS
OXYGEN SATURATION: 97 % | HEIGHT: 64 IN | TEMPERATURE: 99.2 F | WEIGHT: 116.84 LBS | SYSTOLIC BLOOD PRESSURE: 120 MMHG | DIASTOLIC BLOOD PRESSURE: 88 MMHG | HEART RATE: 68 BPM | RESPIRATION RATE: 16 BRPM | BODY MASS INDEX: 19.95 KG/M2

## 2023-05-05 DIAGNOSIS — J30.81 CAT ALLERGIES: ICD-10-CM

## 2023-05-05 DIAGNOSIS — M35.00 SICCA COMPLEX (HCC): Chronic | ICD-10-CM

## 2023-05-05 DIAGNOSIS — Z78.0 POSTMENOPAUSAL: ICD-10-CM

## 2023-05-05 DIAGNOSIS — J45.30 MILD PERSISTENT REACTIVE AIRWAY DISEASE WITHOUT COMPLICATION: Chronic | ICD-10-CM

## 2023-05-05 DIAGNOSIS — M32.8 OTHER FORMS OF SYSTEMIC LUPUS ERYTHEMATOSUS, UNSPECIFIED ORGAN INVOLVEMENT STATUS (HCC): Chronic | ICD-10-CM

## 2023-05-05 DIAGNOSIS — E78.00 PURE HYPERCHOLESTEROLEMIA: ICD-10-CM

## 2023-05-05 DIAGNOSIS — M80.00XD AGE-RELATED OSTEOPOROSIS WITH CURRENT PATHOLOGICAL FRACTURE WITH ROUTINE HEALING: ICD-10-CM

## 2023-05-05 DIAGNOSIS — F41.9 ANXIETY: ICD-10-CM

## 2023-05-05 DIAGNOSIS — K21.00 GASTROESOPHAGEAL REFLUX DISEASE WITH ESOPHAGITIS WITHOUT HEMORRHAGE: ICD-10-CM

## 2023-05-05 DIAGNOSIS — I35.9 AORTIC VALVE DISEASE: ICD-10-CM

## 2023-05-05 DIAGNOSIS — F33.41 RECURRENT MAJOR DEPRESSIVE DISORDER, IN PARTIAL REMISSION (HCC): Chronic | ICD-10-CM

## 2023-05-05 DIAGNOSIS — R53.83 OTHER FATIGUE: ICD-10-CM

## 2023-05-05 PROBLEM — J45.909 REACTIVE AIRWAY DISEASE WITHOUT COMPLICATION: Chronic | Status: ACTIVE | Noted: 2017-06-29

## 2023-05-05 PROBLEM — M19.93 SECONDARY OSTEOARTHRITIS: Status: RESOLVED | Noted: 2018-09-12 | Resolved: 2023-05-05

## 2023-05-05 PROBLEM — M81.8 OTHER OSTEOPOROSIS WITHOUT CURRENT PATHOLOGICAL FRACTURE: Chronic | Status: ACTIVE | Noted: 2017-11-03

## 2023-05-05 PROBLEM — K22.70 BARRETT'S ESOPHAGUS WITHOUT DYSPLASIA: Chronic | Status: ACTIVE | Noted: 2017-06-29

## 2023-05-05 PROBLEM — K22.70 BARRETT'S ESOPHAGUS WITHOUT DYSPLASIA: Chronic | Status: RESOLVED | Noted: 2017-06-29 | Resolved: 2023-05-05

## 2023-05-05 PROCEDURE — 99214 OFFICE O/P EST MOD 30 MIN: CPT | Performed by: BEHAVIOR ANALYST

## 2023-05-05 RX ORDER — ALENDRONATE SODIUM 70 MG/1
70 TABLET ORAL
Qty: 4 TABLET | Refills: 3 | Status: SHIPPED
Start: 2023-05-05 | End: 2023-08-02

## 2023-05-05 ASSESSMENT — ENCOUNTER SYMPTOMS: SHORTNESS OF BREATH: 0

## 2023-05-05 ASSESSMENT — FIBROSIS 4 INDEX: FIB4 SCORE: 2.66

## 2023-05-05 NOTE — PROGRESS NOTES
Subjective:     CC:    Chief Complaint   Patient presents with    Establish Care    Referral Needed     Allergy doc  possible allergic to cats           HISTORY OF THE PRESENT ILLNESS: Patient is a 82 y.o. female. This pleasant patient is here today to establish care and discuss chronic conditions and allergies.  Prior PCP was Dr. Squires.  She requests to switch to my care today.     Problem   Gastroesophageal Reflux Disease With Esophagitis Without Hemorrhage    Upper endoscopy with [facility] 4/26/2023, she has mild reflux esophagitis and Candida of the esophagus.  She completed a 10-day course of fluconazole. She was previous on chronic steroids for 18 years and has been off for 30 years for lupus.   She takes Pepcid daily.      Anxiety    She uses valium 0.5 tab once per night to help relieve pain and for sleep. . She takes half a tab of xanax for PRN anxiety.      Cat Allergies    She is allergic to cats and her son dropped off two kitties at her house recently.  she is requesting referral to allergy doctor.  Her eyes are constantly itching and her nose is runny.   She is taking azelastine and allegra once daily.   She is using her albuterol at night more with the cats sleeping by her.      Recurrent Major Depressive Disorder, in Partial Remission (Hcc)    Long, cold winter for her. She takes fluvaxemine. Managed by psychiatry     Age-Related Osteoporosis With Current Pathological Fracture With Routine Healing     3/2019:  FINDINGS:  The mean bone mineral density for the lumbar spine is 0.872 g/cm2, which corresponds to a T score of -2.5 and a Z score of -0.3.     The distal right forearm has a mean bone mineral density of 0.623 g/cm2, with a T score of -2.9 and a Z score of -0.4.    Current or prior Rx: states she has never been on biphosphates.   Calcium and Vit D supplements: vit D3, not taking calcium  Falls or fractures: yes, hip fractures in the past  Weight bearing exercise: walki  Risk factors:  lower end BMI- underweight, prior chronic prednisone.   Dental status: Current with dental visits.     Conditions affecting treatment: negative for: difficulty swallowing, CKD stage 4 or greater, hypocalcemia. She is able to sit or stand after taking medicine and understand directions for taking medicine.     Reactive Airway Disease Without Complication    She takes albuterol rescue inhaler as needed. She is always using her albuterol inhaler more with the cats.      Lupus (Systemic Lupus Erythematosus) ([facility])    Chronic problem. Long history of SLE. Followed by Dr. Bernal here at [facility]. She is not pleased with her care at this point.      Sicca Complex (Hcc)    Chronic problem. Uses eye drops and special mouth wash.     Aortic Valve Disease    Followed by Dr. Bolanos but he recently retired.  Last echocardiogram 2022 with mild AI and mild MR.    Prescribed triamterene-hydrochlorothiazide 37.5-25 mg as needed for swelling in her ankle. Rarely uses.      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 Stay sitting up for 30 min and full glass of water    albuterol 108 (90 Base) MCG/ACT Aero Soln inhalation aerosol Inhale 2 Puffs every 6 hours as needed for Shortness of Breath.    hyoscyamine (LEVSIN) 0.125 MG tablet Take 0.125 mg by mouth.    triamterene/hctz (MAXZIDE-25/DYAZIDE) 37.5-25 MG Cap Take 1 Capsule by mouth 1 time a day as needed (swelling).    famotidine (PEPCID) 20 MG Tab     ibuprofen (MOTRIN) 600 MG Tab Take 600 mg by mouth every 6 hours as needed.    ALPRAZolam (XANAX) 0.25 MG Tab     azelastine (ASTELIN) 137 MCG/SPRAY nasal spray Administer 1 Spray into affected nostril(S) 2 times a day.    diazePAM (VALIUM) 5 MG Tab Taking 2.5 at night PRN    vitamin D, Ergocalciferol, (DRISDOL) 94741 units Cap capsule Take  by mouth every 7 days.     Fluvoxamine Maleate 100 MG CAPSULE SR 24 HR Take 100 mg by mouth every bedtime.    Turmeric, Curcuma Longa, (CURCUMIN) Powder by Does not apply route.    BIOTIN 5000 PO Take  by mouth every day.    Multiple Vitamin (MULTIVITAMIN PO) Take  by mouth every day.    naproxen (NAPROSYN) 375 MG Tab Take 375 mg by mouth 2 times a day, with meals.        Social History     Socioeconomic History    Marital status:    Tobacco Use    Smoking status: Former     Years: 34.00     Types: Cigarettes     Quit date: 1978     Years since quittin.2    Smokeless tobacco: Former     Quit date: 1978   Vaping Use    Vaping Use: Never used   Substance and Sexual Activity    Alcohol use: Yes     Comment: Rarely    Drug use: No    Sexual activity: Never     Comment:    8 yrs ago     Social Determinants of Health     Stress: Stress Concern Present    Feeling of Stress : Rather much   Social Connections: Moderately Integrated    Frequency of Communication with Friends and Family: More than three times a week    Frequency of Social Gatherings with Friends and Family: More than three times a week    Attends Gnosticism Services: More than 4 times per year    Active Member of Clubs or Organizations: Yes    Attends Club or Organization Meetings: 1 to 4 times per year    Marital Status:    Housing Stability: Low Risk     Unable to Pay for Housing in the Last Year: No    Number of Places Lived in the Last Year: 1    Unstable Housing in the Last Year: No       Family History   Problem Relation Age of Onset    Arthritis Mother     Cancer Father     Heart Disease Father     Hypertension Father     Diabetes Maternal Aunt     Cancer Maternal Uncle     Heart Disease Maternal Grandmother     Diabetes Maternal Grandfather     Genetic Disorder Paternal Grandmother     Heart Disease Paternal Grandfather     Hyperlipidemia Paternal Grandfather     Diabetes Maternal Aunt     Genetic Disorder Maternal Uncle     Cancer  "Paternal Aunt     Cancer Paternal Aunt     Cancer Paternal Uncle        ROS: See HPI  Review of Systems   Constitutional:  Positive for malaise/fatigue.   Respiratory:  Negative for shortness of breath.    Cardiovascular:  Negative for chest pain.       Objective:     Exam: /88 (BP Location: Left arm, Patient Position: Sitting, BP Cuff Size: Adult long)   Pulse 68   Temp 37.3 °C (99.2 °F)   Resp 16   Ht 1.626 m (5' 4\")   Wt 53 kg (116 lb 13.5 oz)   SpO2 97%   BMI 20.06 kg/m²   Body mass index is 20.06 kg/m².    Physical Exam  Constitutional:       Appearance: Normal appearance.   HENT:      Head: Normocephalic and atraumatic.   Cardiovascular:      Rate and Rhythm: Normal rate and regular rhythm.      Pulses: Normal pulses.      Heart sounds: Normal heart sounds.   Pulmonary:      Effort: Pulmonary effort is normal.      Breath sounds: Normal breath sounds.   Musculoskeletal:      Cervical back: Normal range of motion and neck supple.   Neurological:      General: No focal deficit present.      Mental Status: She is alert.              Assessment & Plan:     82 y.o. female with the following -     1. Sicca complex (HCC)  -Continue supportive therapies.    2. Recurrent major depressive disorder, in partial remission (HCC)  -Managed by psychiatry.  Continue fluvoxamine as prescribed.     3. Mild persistent reactive airway disease without complication  -Intermittent but becoming more of a problem with her cats.  -Recommend that she not allow the cats in her room or on her bed.  She may need to rehome the cats if breathing worsens.  Referral to allergy placed.   -Continue albuterol rescue inhaler.  - Referral to Allergy    4. Other forms of systemic lupus erythematosus, unspecified organ involvement status ([facility])  -Followed by Dr. Bernal with [facility].  -She states that she was never able to get the wrote results of her lab work.  She was supposed to follow-up in 3 months but has not last year.  She was " advised to call to schedule an appointment.    5. Gastroesophageal reflux disease with esophagitis without hemorrhage  -Chronic, mild acid reflux esophagitis per EGD last month.  -Continue Pepcid 20 mg daily    6. Cat allergies  - Referral to Allergy    7. Aortic valve disease  -Mild aortic valve insufficiency.  Her cardiologist recently retired.  I will gladly follow echocardiograms which are only needed once every few years given her mild disease.   -Continue HCTZ's-triamterene as needed for LE swelling  - Comp Metabolic Panel; Future    8. Age-related osteoporosis with current pathological fracture with routine healing  -Chronic, persistent.  History of hip fracture and long-term steroid use.  Surprisingly, she is certain that she is never been treated for osteoporosis.  -Osteoporosis evident on DEXA scan in 2019.   - Educated patient regarding new medication and potential side effects.   - Recommended diet high in protein, calcium, and vitamin D as well as supplementing with 1,000ui of VitD and 1200mg of calcium. Recommended weight bearing exercises (30 minutes on most days of the week).   -Repeat bone density for updated baseline study with starting biphosphonate.  - DS-BONE DENSITY STUDY (DEXA); Future  - alendronate (FOSAMAX) 70 MG Tab; Take 1 Tablet by mouth every 7 days. Stay sitting up for 30 min and full glass of water  Dispense: 4 Tablet; Refill: 3  - VITAMIN D,25 HYDROXY (DEFICIENCY); Future    9. Postmenopausal  - DS-BONE DENSITY STUDY (DEXA); Future    10. Anxiety  -Continue management with psychiatry    11. Pure hypercholesterolemia  -Mild and not on a statin.  - Lipid Profile; Future    12. Other fatigue  -New problem.  She also mentions her hair is thinning.  She did have an elevated TPO on recent labs but did not have a TSH checked in the last year.  - TSH WITH REFLEX TO FT4; Future    Lab results and new med check in 2 months    HCC Gap Form    Diagnosis to address: F33.41 - Recurrent major  depressive disorder, in partial remission (HCC)  Assessment and plan: Chronic, stable, as based on today's assessment and impact on other conditions evaluated today. Continue with current treatment plan: Fluvoxamine 100mg. follow-up with specialist as directed, but at least annually.  Last edited 05/05/23 14:49 PDT by KELLIE Maldonado.         Return in about 2 months (around 7/5/2023) for med check.    Please note that this dictation was created using voice recognition software. I have made every reasonable attempt to correct obvious errors, but I expect that there are errors of grammar and possibly content that I did not discover before finalizing the note.

## 2023-05-11 ENCOUNTER — APPOINTMENT (RX ONLY)
Dept: URBAN - METROPOLITAN AREA CLINIC 6 | Facility: CLINIC | Age: 83
Setting detail: DERMATOLOGY
End: 2023-05-11

## 2023-05-11 DIAGNOSIS — Z71.89 OTHER SPECIFIED COUNSELING: ICD-10-CM

## 2023-05-11 DIAGNOSIS — Z85.828 PERSONAL HISTORY OF OTHER MALIGNANT NEOPLASM OF SKIN: ICD-10-CM

## 2023-05-11 DIAGNOSIS — D22 MELANOCYTIC NEVI: ICD-10-CM

## 2023-05-11 DIAGNOSIS — F42.4 EXCORIATION (SKIN-PICKING) DISORDER: ICD-10-CM

## 2023-05-11 DIAGNOSIS — D18.0 HEMANGIOMA: ICD-10-CM

## 2023-05-11 DIAGNOSIS — L57.0 ACTINIC KERATOSIS: ICD-10-CM

## 2023-05-11 DIAGNOSIS — L82.1 OTHER SEBORRHEIC KERATOSIS: ICD-10-CM

## 2023-05-11 DIAGNOSIS — L81.4 OTHER MELANIN HYPERPIGMENTATION: ICD-10-CM

## 2023-05-11 PROBLEM — D22.72 MELANOCYTIC NEVI OF LEFT LOWER LIMB, INCLUDING HIP: Status: ACTIVE | Noted: 2023-05-11

## 2023-05-11 PROBLEM — D18.01 HEMANGIOMA OF SKIN AND SUBCUTANEOUS TISSUE: Status: ACTIVE | Noted: 2023-05-11

## 2023-05-11 PROBLEM — D22.61 MELANOCYTIC NEVI OF RIGHT UPPER LIMB, INCLUDING SHOULDER: Status: ACTIVE | Noted: 2023-05-11

## 2023-05-11 PROBLEM — D22.5 MELANOCYTIC NEVI OF TRUNK: Status: ACTIVE | Noted: 2023-05-11

## 2023-05-11 PROBLEM — D22.62 MELANOCYTIC NEVI OF LEFT UPPER LIMB, INCLUDING SHOULDER: Status: ACTIVE | Noted: 2023-05-11

## 2023-05-11 PROBLEM — D22.71 MELANOCYTIC NEVI OF RIGHT LOWER LIMB, INCLUDING HIP: Status: ACTIVE | Noted: 2023-05-11

## 2023-05-11 PROCEDURE — ? LIQUID NITROGEN

## 2023-05-11 PROCEDURE — ? ADDITIONAL NOTES

## 2023-05-11 PROCEDURE — ? COUNSELING

## 2023-05-11 PROCEDURE — 99213 OFFICE O/P EST LOW 20 MIN: CPT | Mod: 25

## 2023-05-11 PROCEDURE — 17000 DESTRUCT PREMALG LESION: CPT

## 2023-05-11 ASSESSMENT — LOCATION DETAILED DESCRIPTION DERM
LOCATION DETAILED: RIGHT PROXIMAL CALF
LOCATION DETAILED: LEFT ANTERIOR DISTAL UPPER ARM
LOCATION DETAILED: LEFT INFERIOR UPPER BACK
LOCATION DETAILED: LEFT DISTAL POSTERIOR THIGH
LOCATION DETAILED: RIGHT MEDIAL INFERIOR CHEST
LOCATION DETAILED: LEFT SUPERIOR MEDIAL MALAR CHEEK
LOCATION DETAILED: RIGHT SUPERIOR LATERAL MIDBACK
LOCATION DETAILED: RIGHT ANTERIOR PROXIMAL THIGH
LOCATION DETAILED: LEFT PROXIMAL CALF
LOCATION DETAILED: LEFT PROXIMAL DORSAL FOREARM
LOCATION DETAILED: RIGHT SUPERIOR UPPER BACK
LOCATION DETAILED: LEFT DISTAL DORSAL FOREARM
LOCATION DETAILED: RIGHT PROXIMAL DORSAL FOREARM
LOCATION DETAILED: LEFT DISTAL PRETIBIAL REGION
LOCATION DETAILED: RIGHT VENTRAL PROXIMAL FOREARM
LOCATION DETAILED: LEFT CENTRAL MALAR CHEEK
LOCATION DETAILED: LEFT VENTRAL DISTAL FOREARM
LOCATION DETAILED: RIGHT MID-UPPER BACK
LOCATION DETAILED: RIGHT ANTERIOR DISTAL UPPER ARM
LOCATION DETAILED: LEFT MEDIAL SUPERIOR CHEST
LOCATION DETAILED: LEFT ANTERIOR PROXIMAL THIGH
LOCATION DETAILED: RIGHT DISTAL POSTERIOR THIGH
LOCATION DETAILED: RIGHT VENTRAL DISTAL FOREARM

## 2023-05-11 ASSESSMENT — LOCATION SIMPLE DESCRIPTION DERM
LOCATION SIMPLE: LEFT UPPER ARM
LOCATION SIMPLE: RIGHT LOWER BACK
LOCATION SIMPLE: CHEST
LOCATION SIMPLE: RIGHT FOREARM
LOCATION SIMPLE: LEFT POSTERIOR THIGH
LOCATION SIMPLE: RIGHT CALF
LOCATION SIMPLE: LEFT UPPER BACK
LOCATION SIMPLE: LEFT FOREARM
LOCATION SIMPLE: RIGHT POSTERIOR THIGH
LOCATION SIMPLE: RIGHT UPPER BACK
LOCATION SIMPLE: LEFT CALF
LOCATION SIMPLE: RIGHT THIGH
LOCATION SIMPLE: LEFT CHEEK
LOCATION SIMPLE: RIGHT UPPER ARM
LOCATION SIMPLE: LEFT PRETIBIAL REGION
LOCATION SIMPLE: LEFT THIGH

## 2023-05-11 ASSESSMENT — LOCATION ZONE DERM
LOCATION ZONE: LEG
LOCATION ZONE: TRUNK
LOCATION ZONE: FACE
LOCATION ZONE: ARM

## 2023-05-11 NOTE — PROCEDURE: ADDITIONAL NOTES
Detail Level: Simple
Additional Notes: Appears secondary to trauma, present x 2 weeks. if not healed or improving in 1 month we can biopsy. F/u appt scheduled.
Render Risk Assessment In Note?: yes

## 2023-07-18 ENCOUNTER — APPOINTMENT (OUTPATIENT)
Dept: RADIOLOGY | Facility: MEDICAL CENTER | Age: 83
End: 2023-07-18
Attending: BEHAVIOR ANALYST
Payer: MEDICARE

## 2023-07-24 ENCOUNTER — TELEPHONE (OUTPATIENT)
Dept: HEALTH INFORMATION MANAGEMENT | Facility: OTHER | Age: 83
End: 2023-07-24

## 2023-07-28 ENCOUNTER — HOSPITAL ENCOUNTER (OUTPATIENT)
Dept: LAB | Facility: MEDICAL CENTER | Age: 83
End: 2023-07-28
Attending: BEHAVIOR ANALYST
Payer: MEDICARE

## 2023-07-28 DIAGNOSIS — I35.9 AORTIC VALVE DISEASE: ICD-10-CM

## 2023-07-28 DIAGNOSIS — E78.00 PURE HYPERCHOLESTEROLEMIA: ICD-10-CM

## 2023-07-28 DIAGNOSIS — R53.83 OTHER FATIGUE: ICD-10-CM

## 2023-07-28 DIAGNOSIS — M80.00XD AGE-RELATED OSTEOPOROSIS WITH CURRENT PATHOLOGICAL FRACTURE WITH ROUTINE HEALING: ICD-10-CM

## 2023-07-28 LAB
ALBUMIN SERPL BCP-MCNC: 4.5 G/DL (ref 3.2–4.9)
ALBUMIN/GLOB SERPL: 1.7 G/DL
ALP SERPL-CCNC: 67 U/L (ref 30–99)
ALT SERPL-CCNC: 9 U/L (ref 2–50)
ANION GAP SERPL CALC-SCNC: 10 MMOL/L (ref 7–16)
AST SERPL-CCNC: 23 U/L (ref 12–45)
BILIRUB SERPL-MCNC: 0.5 MG/DL (ref 0.1–1.5)
BUN SERPL-MCNC: 22 MG/DL (ref 8–22)
CALCIUM ALBUM COR SERPL-MCNC: 8.7 MG/DL (ref 8.5–10.5)
CALCIUM SERPL-MCNC: 9.1 MG/DL (ref 8.5–10.5)
CHLORIDE SERPL-SCNC: 102 MMOL/L (ref 96–112)
CHOLEST SERPL-MCNC: 225 MG/DL (ref 100–199)
CO2 SERPL-SCNC: 25 MMOL/L (ref 20–33)
CREAT SERPL-MCNC: 0.96 MG/DL (ref 0.5–1.4)
FASTING STATUS PATIENT QL REPORTED: NORMAL
GFR SERPLBLD CREATININE-BSD FMLA CKD-EPI: 59 ML/MIN/1.73 M 2
GLOBULIN SER CALC-MCNC: 2.7 G/DL (ref 1.9–3.5)
GLUCOSE SERPL-MCNC: 118 MG/DL (ref 65–99)
HDLC SERPL-MCNC: 66 MG/DL
LDLC SERPL CALC-MCNC: 141 MG/DL
POTASSIUM SERPL-SCNC: 4.1 MMOL/L (ref 3.6–5.5)
PROT SERPL-MCNC: 7.2 G/DL (ref 6–8.2)
SODIUM SERPL-SCNC: 137 MMOL/L (ref 135–145)
TRIGL SERPL-MCNC: 89 MG/DL (ref 0–149)

## 2023-07-28 PROCEDURE — 36415 COLL VENOUS BLD VENIPUNCTURE: CPT

## 2023-07-28 PROCEDURE — 80061 LIPID PANEL: CPT

## 2023-07-28 PROCEDURE — 82306 VITAMIN D 25 HYDROXY: CPT | Mod: GA

## 2023-07-28 PROCEDURE — 80053 COMPREHEN METABOLIC PANEL: CPT

## 2023-07-28 PROCEDURE — 84443 ASSAY THYROID STIM HORMONE: CPT

## 2023-07-29 LAB
25(OH)D3 SERPL-MCNC: 32 NG/ML (ref 30–100)
TSH SERPL DL<=0.005 MIU/L-ACNC: 2.2 UIU/ML (ref 0.38–5.33)

## 2023-08-02 ENCOUNTER — OFFICE VISIT (OUTPATIENT)
Dept: MEDICAL GROUP | Facility: MEDICAL CENTER | Age: 83
End: 2023-08-02
Payer: MEDICARE

## 2023-08-02 VITALS
TEMPERATURE: 98.5 F | OXYGEN SATURATION: 97 % | SYSTOLIC BLOOD PRESSURE: 114 MMHG | BODY MASS INDEX: 19.63 KG/M2 | HEIGHT: 64 IN | DIASTOLIC BLOOD PRESSURE: 70 MMHG | HEART RATE: 62 BPM | WEIGHT: 115 LBS

## 2023-08-02 DIAGNOSIS — R94.4 DECREASED GFR: ICD-10-CM

## 2023-08-02 DIAGNOSIS — E78.00 PURE HYPERCHOLESTEROLEMIA: ICD-10-CM

## 2023-08-02 DIAGNOSIS — Z11.59 NEED FOR HEPATITIS B SCREENING TEST: ICD-10-CM

## 2023-08-02 DIAGNOSIS — J45.30 MILD PERSISTENT REACTIVE AIRWAY DISEASE WITHOUT COMPLICATION: Chronic | ICD-10-CM

## 2023-08-02 DIAGNOSIS — M80.00XD AGE-RELATED OSTEOPOROSIS WITH CURRENT PATHOLOGICAL FRACTURE WITH ROUTINE HEALING: Chronic | ICD-10-CM

## 2023-08-02 DIAGNOSIS — M25.50 POLYARTHRALGIA: ICD-10-CM

## 2023-08-02 PROCEDURE — 3074F SYST BP LT 130 MM HG: CPT | Performed by: BEHAVIOR ANALYST

## 2023-08-02 PROCEDURE — 3078F DIAST BP <80 MM HG: CPT | Performed by: BEHAVIOR ANALYST

## 2023-08-02 PROCEDURE — 99214 OFFICE O/P EST MOD 30 MIN: CPT | Performed by: BEHAVIOR ANALYST

## 2023-08-02 RX ORDER — CETIRIZINE HYDROCHLORIDE 10 MG/1
10 TABLET ORAL DAILY
COMMUNITY

## 2023-08-02 RX ORDER — CELECOXIB 100 MG/1
100 CAPSULE ORAL DAILY
Qty: 100 CAPSULE | Refills: 0 | Status: SHIPPED | OUTPATIENT
Start: 2023-08-02 | End: 2024-03-13

## 2023-08-02 RX ORDER — RISEDRONATE SODIUM 150 MG/1
1 TABLET, FILM COATED ORAL
Qty: 3 TABLET | Refills: 3 | Status: SHIPPED | OUTPATIENT
Start: 2023-08-02

## 2023-08-02 ASSESSMENT — FIBROSIS 4 INDEX: FIB4 SCORE: 3.11

## 2023-08-02 ASSESSMENT — ENCOUNTER SYMPTOMS: SHORTNESS OF BREATH: 0

## 2023-08-02 NOTE — PROGRESS NOTES
Subjective:     CC:    Chief Complaint   Patient presents with    Follow-Up     Med check          HISTORY OF THE PRESENT ILLNESS:   Mariama presents today with    Problem   Decreased Gfr   Polyarthralgia    Currently taking ibuprofen 600mg once a day for joint pains. Taking aleve prior to this. Now with reduced GFR.         Pure Hypercholesterolemia    Lab Results   Component Value Date/Time    CHOLSTRLTOT 225 (H) 07/28/2023 02:03 PM    HDL 66 07/28/2023 02:03 PM     (H) 07/28/2023 02:03 PM          Age-Related Osteoporosis With Current Pathological Fracture With Routine Healing    Has not started the fosamax due to not being able to get her updated DEXA scan yet. She was scheduled 2 months out and then had to be rescheduled so now not scheduled until September. She is also wanting to try the once a month medication.      3/2019:  FINDINGS:  The mean bone mineral density for the lumbar spine is 0.872 g/cm2, which corresponds to a T score of -2.5 and a Z score of -0.3.     The distal right forearm has a mean bone mineral density of 0.623 g/cm2, with a T score of -2.9 and a Z score of -0.4.    Current or prior Rx: states she has never been on biphosphates.   Calcium and Vit D supplements: vit D3, not taking calcium  Falls or fractures: yes, hip fractures in the past  Weight bearing exercise: walki  Risk factors: lower end BMI- underweight, prior chronic prednisone.   Dental status: Current with dental visits.     Conditions affecting treatment: negative for: difficulty swallowing, CKD stage 4 or greater, hypocalcemia. She is able to sit or stand after taking medicine and understand directions for taking medicine.     Reactive Airway Disease Without Complication    She takes albuterol rescue inhaler as needed. She is still using her albuterol inhaler more with the cats but not has much as prior to her last visit.  She saw an allergist as referred. She is currently taking pepcid, zyrtec, and astelin spray. She got  "her allergy tests back is highly allergic to cats and some foods including wheat and oats.          Current Outpatient Medications   Medication Sig    cetirizine (ZYRTEC) 10 MG Tab Take 10 mg by mouth every day.    celecoxib (CELEBREX) 100 MG Cap Take 1 Capsule by mouth every day.    Risedronate Sodium 150 MG Tab Take 1 Tablet by mouth every 30 (thirty) days.    albuterol 108 (90 Base) MCG/ACT Aero Soln inhalation aerosol Inhale 2 Puffs every 6 hours as needed for Shortness of Breath.    hyoscyamine (LEVSIN) 0.125 MG tablet Take 0.125 mg by mouth.    triamterene/hctz (MAXZIDE-25/DYAZIDE) 37.5-25 MG Cap Take 1 Capsule by mouth 1 time a day as needed (swelling).    famotidine (PEPCID) 20 MG Tab     ALPRAZolam (XANAX) 0.25 MG Tab     azelastine (ASTELIN) 137 MCG/SPRAY nasal spray Administer 1 Spray into affected nostril(S) 2 times a day.    diazePAM (VALIUM) 5 MG Tab Taking 2.5 at night PRN    vitamin D, Ergocalciferol, (DRISDOL) 84514 units Cap capsule Take  by mouth every 7 days.    Fluvoxamine Maleate 100 MG CAPSULE SR 24 HR Take 100 mg by mouth every bedtime.    Turmeric, Curcuma Longa, (CURCUMIN) Powder by Does not apply route.    BIOTIN 5000 PO Take  by mouth every day.    Multiple Vitamin (MULTIVITAMIN PO) Take  by mouth every day.          ROS: See HPI  Review of Systems   Constitutional:  Negative for malaise/fatigue.   Respiratory:  Negative for shortness of breath.    Cardiovascular:  Negative for chest pain.   Musculoskeletal:  Positive for joint pain.         Objective:     Exam: /70 (BP Location: Left arm, Patient Position: Sitting, BP Cuff Size: Adult long)   Pulse 62   Temp 36.9 °C (98.5 °F) (Temporal)   Ht 1.626 m (5' 4\")   Wt 52.2 kg (115 lb)   SpO2 97%   BMI 19.74 kg/m²   Body mass index is 19.74 kg/m².    Physical Exam  Constitutional:       Appearance: Normal appearance.   Cardiovascular:      Rate and Rhythm: Normal rate and regular rhythm.      Pulses: Normal pulses.   Pulmonary:      " Effort: Pulmonary effort is normal. No respiratory distress.   Musculoskeletal:      Cervical back: Normal range of motion and neck supple.   Neurological:      Mental Status: She is alert.                Assessment & Plan:     83 y.o. female with the following -     1. Age-related osteoporosis with current pathological fracture with routine healing  - Chronic problem. Reduced bone mass seen on DEXA scan in 2019.  - Recommended diet high in protein, calcium, and vitamin D as well as supplementing with 1,000ui of VitD and 1200mg of calcium. Recommended weight bearing exercises (30 minutes on most days of the week).   Pt requesting to try the once monthly bisphosphonate. Potential Side effects reviewed again.   - Complete dexa scan when able.   - Risedronate Sodium 150 MG Tab; Take 1 Tablet by mouth every 30 (thirty) days.  Dispense: 3 Tablet; Refill: 3    2. Polyarthralgia  - Chronic problem.  Currently taking 600 mg ibuprofen daily  -Would prefer that she completely avoid anti-inflammatories given her mild reduction in GFR.  However, patient feels her quality of life would be severely reduced.  We will try a very low-dose Celebrex.  Patient not to take ibuprofen or Aleve in addition to the Celebrex.  - celecoxib (CELEBREX) 100 MG Cap; Take 1 Capsule by mouth every day.  Dispense: 100 Capsule; Refill: 0    3. Decreased GFR  -New problem.  GFR-59.  Creatinine and BUN within normal limits  -Related to natural renal decline with aging possibly NSAID use.   -Educated patient regarding NSAID use and kidney function.  As stated above, recommended avoiding NSAIDs as much as possible.  Recommended increasing water intake.  Recheck kidney function in about 3 months.   - Basic Metabolic Panel; Future  - ESTIMATED GFR; Future    4. Mild persistent reactive airway disease without complication  -Chronic problem.  Continue as needed albuterol inhaler and continue working with allergist.  -recommend that she did not allow her cats  in her room or bed.     6. Need for hepatitis B screening test  - HEP B SURFACE AB; Future    Other orders  - cetirizine (ZYRTEC) 10 MG Tab; Take 10 mg by mouth every day.          Return in about 6 months (around 2/2/2024) for Annual preventative.    Please note that this dictation was created using voice recognition software. I have made every reasonable attempt to correct obvious errors, but I expect that there are errors of grammar and possibly content that I did not discover before finalizing the note.

## 2023-09-11 ENCOUNTER — HOSPITAL ENCOUNTER (OUTPATIENT)
Dept: RADIOLOGY | Facility: MEDICAL CENTER | Age: 83
End: 2023-09-11
Attending: BEHAVIOR ANALYST
Payer: MEDICARE

## 2023-09-11 DIAGNOSIS — Z78.0 POSTMENOPAUSAL: ICD-10-CM

## 2023-09-11 DIAGNOSIS — M80.00XD AGE-RELATED OSTEOPOROSIS WITH CURRENT PATHOLOGICAL FRACTURE WITH ROUTINE HEALING: ICD-10-CM

## 2023-09-11 PROCEDURE — 77080 DXA BONE DENSITY AXIAL: CPT

## 2023-11-17 ENCOUNTER — HOSPITAL ENCOUNTER (OUTPATIENT)
Dept: LAB | Facility: MEDICAL CENTER | Age: 83
End: 2023-11-17
Attending: BEHAVIOR ANALYST
Payer: MEDICARE

## 2023-11-17 DIAGNOSIS — Z11.59 NEED FOR HEPATITIS B SCREENING TEST: ICD-10-CM

## 2023-11-17 DIAGNOSIS — R94.4 DECREASED GFR: ICD-10-CM

## 2023-11-17 LAB
ANION GAP SERPL CALC-SCNC: 11 MMOL/L (ref 7–16)
BUN SERPL-MCNC: 18 MG/DL (ref 8–22)
CALCIUM SERPL-MCNC: 10.2 MG/DL (ref 8.5–10.5)
CHLORIDE SERPL-SCNC: 103 MMOL/L (ref 96–112)
CO2 SERPL-SCNC: 25 MMOL/L (ref 20–33)
CREAT SERPL-MCNC: 0.88 MG/DL (ref 0.5–1.4)
GFR SERPLBLD CREATININE-BSD FMLA CKD-EPI: 65 ML/MIN/1.73 M 2
GLUCOSE SERPL-MCNC: 76 MG/DL (ref 65–99)
HBV SURFACE AB SERPL IA-ACNC: 309 MIU/ML (ref 0–10)
POTASSIUM SERPL-SCNC: 4.1 MMOL/L (ref 3.6–5.5)
SODIUM SERPL-SCNC: 139 MMOL/L (ref 135–145)

## 2023-11-17 PROCEDURE — 86706 HEP B SURFACE ANTIBODY: CPT | Mod: GA

## 2023-11-17 PROCEDURE — 80048 BASIC METABOLIC PNL TOTAL CA: CPT

## 2023-11-17 PROCEDURE — 36415 COLL VENOUS BLD VENIPUNCTURE: CPT | Mod: GA

## 2023-11-29 ENCOUNTER — OFFICE VISIT (OUTPATIENT)
Dept: MEDICAL GROUP | Facility: IMAGING CENTER | Age: 83
End: 2023-11-29
Payer: MEDICARE

## 2023-11-29 VITALS
DIASTOLIC BLOOD PRESSURE: 72 MMHG | TEMPERATURE: 98.6 F | HEART RATE: 76 BPM | SYSTOLIC BLOOD PRESSURE: 138 MMHG | WEIGHT: 120.8 LBS | RESPIRATION RATE: 16 BRPM | HEIGHT: 64 IN | BODY MASS INDEX: 20.62 KG/M2 | OXYGEN SATURATION: 95 %

## 2023-11-29 DIAGNOSIS — N39.0 URINARY TRACT INFECTION WITHOUT HEMATURIA, SITE UNSPECIFIED: ICD-10-CM

## 2023-11-29 DIAGNOSIS — E87.6 HYPOKALEMIA: ICD-10-CM

## 2023-11-29 LAB
APPEARANCE UR: NORMAL
BILIRUB UR STRIP-MCNC: NORMAL MG/DL
COLOR UR AUTO: NORMAL
GLUCOSE UR STRIP.AUTO-MCNC: NORMAL MG/DL
KETONES UR STRIP.AUTO-MCNC: NORMAL MG/DL
LEUKOCYTE ESTERASE UR QL STRIP.AUTO: NORMAL
NITRITE UR QL STRIP.AUTO: NORMAL
PH UR STRIP.AUTO: 5.5 [PH] (ref 5–8)
PROT UR QL STRIP: NORMAL MG/DL
RBC UR QL AUTO: NORMAL
SP GR UR STRIP.AUTO: 1.02
UROBILINOGEN UR STRIP-MCNC: 0.2 MG/DL

## 2023-11-29 PROCEDURE — 3075F SYST BP GE 130 - 139MM HG: CPT

## 2023-11-29 PROCEDURE — 3078F DIAST BP <80 MM HG: CPT

## 2023-11-29 PROCEDURE — 99213 OFFICE O/P EST LOW 20 MIN: CPT

## 2023-11-29 PROCEDURE — 81002 URINALYSIS NONAUTO W/O SCOPE: CPT

## 2023-11-29 ASSESSMENT — FIBROSIS 4 INDEX: FIB4 SCORE: 3.11

## 2023-11-29 NOTE — PROGRESS NOTES
"  Subjective:     CC:   Chief Complaint   Patient presents with    Follow-Up     Ed visit  11/24   Pt reported  today having cold and pelvic pain and leg pains        HPI:   Mariama presents today to discuss:    ED visit follow up  Patient presented to ED at [facility] ER on 11/24 and was diagnosed with hypokalemia and UTI.  Patient was started on a 7 day course of cefdinir for UTI. She is currently on day 5 of 7.  She is tolerating medications well without side effects.  CT abdomen and pelvis was negative for kidney stones.   Overall, her symptoms have improved.  She denies dysuria, hematuria, urinary urgency and frequency, and flank pain.    Hypokalemia  New finding.  Patient presented to ED with symptoms of muscle weakness and \"balance issues.\"  Patient's potassium level was 2.9 in ER.    Patient's potassium level from recent CMP taken on 11/17 was 4.1.  She continues to have mild muscle weakness; however, her overall, her symptoms have improved and is feeling well.   Patient denies nausea, vomiting, diarrhea, palpitations, chest pain, extreme fatigue, severe muscle weakness, cramps, or syncope.    EKG Interpretation    Interpreted by Arpita DWYER    Rhythm: normal sinus   Rate: normal  Axis: normal  Ectopy: none  Conduction: normal  ST Segments: minimal ST depression, anterolateral leads (unchanged from previous EKG)  T Waves: no acute change  Q Waves: none    Clinical Impression: no acute changes      Past Medical History:   Diagnosis Date    Allergy, unspecified not elsewhere classified     Anxiety     Arthritis     Gtz's esophagus without dysplasia 6/29/2017    Blood transfusion, without reported diagnosis     CATARACT     Dental disorder     lichen planus    Depression     Fibromyalgia     GERD (gastroesophageal reflux disease)     GERD (gastroesophageal reflux disease)     Headache(784.0)     Headache, classical migraine     Kidney calculi     Lichen planus     Lupus (HCC)     Migraine without " aura, without mention of intractable migraine without mention of status migrainosus     Mitral valve prolapse     Osteoporosis, unspecified     Other convulsions     Isolated seizure March, 2014    Pain     joints, muscles    Psychiatric problem     depression    Shingles 10/4/11    dr velasco aware of this back of head    Sjogren's syndrome (HCC)     Snoring     Unspecified asthma(493.90)     Urinary tract infection, site not specified      Family History   Problem Relation Age of Onset    Arthritis Mother     Cancer Father     Heart Disease Father     Hypertension Father     Diabetes Maternal Aunt     Cancer Maternal Uncle     Heart Disease Maternal Grandmother     Diabetes Maternal Grandfather     Genetic Disorder Paternal Grandmother     Heart Disease Paternal Grandfather     Hyperlipidemia Paternal Grandfather     Diabetes Maternal Aunt     Genetic Disorder Maternal Uncle     Cancer Paternal Aunt     Cancer Paternal Aunt     Cancer Paternal Uncle      Past Surgical History:   Procedure Laterality Date    HIP CANNULATED SCREW  1/28/2014    Performed by Cristian Delgado M.D. at SURGERY [facility] ORS    CLOSED REDUCTION  8/6/2012    Performed by CRISTIAN DELGADO at SURGERY [facility] ORS    HIP CANNULATED SCREW  8/6/2012    Performed by CRISTIAN DELGADO at SURGERY [facility] ORS    SHOULDER DECOMPRESSION ARTHROSCOPIC  10/6/2011    Performed by LARY CAR at [facility] ORS    CLAVICLE DISTAL EXCISION  10/6/2011    Performed by LARY CAR at SURGERY [facility] ORS    OTHER  2007    reconstruction of shoulder to cover chest wall    MAMMOPLASTY AUGMENTATION  1988    removed, post op complications    OTHER  1988    17 reconstruction surgeries post mammoplasty    MASTECTOMY  1972    b/l    BREAST BIOPSY  1970's    x 5    ABDOMINAL EXPLORATION      EYE SURGERY      OPEN REDUCTION       Social History     Tobacco Use    Smoking status: Former     Current packs/day: 0.00     Types:  Cigarettes     Start date: 1944     Quit date: 1978     Years since quittin.8    Smokeless tobacco: Former     Quit date: 1978   Vaping Use    Vaping Use: Never used   Substance Use Topics    Alcohol use: Yes     Comment: Rarely    Drug use: No     Social History     Social History Narrative    Not on file     Current Outpatient Medications Ordered in Epic   Medication Sig Dispense Refill    SYMBICORT 160-4.5 MCG/ACT Aerosol       fluvoxamine (LUVOX) 100 MG tablet       cetirizine (ZYRTEC) 10 MG Tab Take 10 mg by mouth every day.      albuterol 108 (90 Base) MCG/ACT Aero Soln inhalation aerosol Inhale 2 Puffs every 6 hours as needed for Shortness of Breath. 8.5 g 2    hyoscyamine (LEVSIN) 0.125 MG tablet Take 0.125 mg by mouth.      famotidine (PEPCID) 20 MG Tab       ALPRAZolam (XANAX) 0.25 MG Tab       azelastine (ASTELIN) 137 MCG/SPRAY nasal spray Administer 1 Spray into affected nostril(S) 2 times a day. 30 mL 2    diazePAM (VALIUM) 5 MG Tab Taking 2.5 at night PRN      vitamin D, Ergocalciferol, (DRISDOL) 77457 units Cap capsule Take  by mouth every 7 days.      Turmeric, Curcuma Longa, (CURCUMIN) Powder by Does not apply route.      BIOTIN 5000 PO Take  by mouth every day.      Multiple Vitamin (MULTIVITAMIN PO) Take  by mouth every day.      methylPREDNISolone (MEDROL DOSEPAK) 4 MG Tablet Therapy Pack Follow schedule on package instructions. 21 Tablet 0    celecoxib (CELEBREX) 100 MG Cap Take 1 Capsule by mouth every day. 100 Capsule 0    Risedronate Sodium 150 MG Tab Take 1 Tablet by mouth every 30 (thirty) days. 3 Tablet 3    triamterene/hctz (MAXZIDE-25/DYAZIDE) 37.5-25 MG Cap Take 1 Capsule by mouth 1 time a day as needed (swelling). (Patient not taking: Reported on 2023) 30 Capsule 2    Fluvoxamine Maleate 100 MG CAPSULE SR 24 HR Take 100 mg by mouth every bedtime.       No current Epic-ordered facility-administered medications on file.     Ciprofloxacin, Methotrexate, Other  "drug, and Sulfa drugs    ROS: see hpi  Gen: no fevers/chills  Pulm: no sob, no cough  CV: no chest pain, no palpitations, no edema  GI: no nausea/vomiting, no diarrhea  Skin: no rash    Objective:   Exam:  /72 (BP Location: Left arm, Patient Position: Sitting, BP Cuff Size: Adult)   Pulse 76   Temp 37 °C (98.6 °F) (Temporal)   Resp 16   Ht 1.626 m (5' 4\")   Wt 54.8 kg (120 lb 12.8 oz)   SpO2 95%   BMI 20.74 kg/m²    Body mass index is 20.74 kg/m².    Gen: Alert and oriented, No apparent distress.  HEENT: Head atraumatic, normocephalic. Pupils equal and round.  Neck: Neck is supple without lymphadenopathy.   Lungs: Normal effort, CTA bilaterally, no wheezes, rhonchi, or rales  CV: Regular rate and rhythm. No murmurs, rubs, or gallops.  ABD: +BS. Non-tender, non-distended. No rebound, rigidity, or guarding.  Ext: No clubbing, cyanosis, edema.    Assessment & Plan:     83 y.o. female with the following -     1. Urinary tract infection without hematuria, site unspecified  New and resolved condition.  She is asymptomatic.  Patient's urinalysis is negative for nitrates, leukocytes, and RBC.  Recommend to complete antibiotic therapy.  Instructed to increase fluid intake.  May start OTC Azo mannose supplementation.  ED symptoms discussed.    - POCT Urinalysis    2. Hypokalemia  New finding.  Etiology unknown.  EKG unremarkable for arrhythmias or acute cardiac ischemia. Her symptoms are improving.  Recommend to continue potassium supplementation.  Will repeat BMP to evaluate potassium level.  Instructed patient to go to ED for worsening symptoms.    - EKG - Clinic Performed  - Basic Metabolic Panel; Future    Medical Decision Making/Course:  In the course of preparing for this visit with review of the pertinent past medical history, recent and past clinic visits, current medications, and performing chart, immunization, medical history and medication reconciliation, and in the further course of obtaining the " current history pertinent to the clinic visit today, performing an exam and evaluation, ordering and independently evaluating labs, tests, and/or procedures, prescribing any recommended new medications as noted above, providing any pertinent counseling and education and recommending further coordination of care. This was discussed with patient in a shared-decision making conversation, and they understand and agreed with plan of care.     Return if symptoms worsen or fail to improve.    KELLIE Kc.   [facility]    Please note that this dictation was created using voice recognition software. I have made every reasonable attempt to correct obvious errors, but I expect that there are errors of grammar and possibly content that I did not discover before finalizing the note.

## 2023-12-07 ENCOUNTER — APPOINTMENT (OUTPATIENT)
Dept: RADIOLOGY | Facility: MEDICAL CENTER | Age: 83
End: 2023-12-07
Attending: NURSE PRACTITIONER
Payer: MEDICARE

## 2023-12-21 ENCOUNTER — APPOINTMENT (OUTPATIENT)
Dept: MEDICAL GROUP | Facility: MEDICAL CENTER | Age: 83
End: 2023-12-21
Payer: MEDICARE

## 2023-12-29 ENCOUNTER — HOSPITAL ENCOUNTER (OUTPATIENT)
Dept: RADIOLOGY | Facility: MEDICAL CENTER | Age: 83
End: 2023-12-29
Attending: NURSE PRACTITIONER
Payer: MEDICARE

## 2023-12-29 DIAGNOSIS — M54.16 LUMBAR RADICULOPATHY: ICD-10-CM

## 2023-12-29 PROCEDURE — 72148 MRI LUMBAR SPINE W/O DYE: CPT

## 2024-01-18 NOTE — PROCEDURE: PATIENT SPECIFIC COUNSELING
The patient is advised to d/c use of the topical steroid. She may apply lip moisturizers with sunscreen daily.
Detail Level: Detailed
[id number]

## 2024-01-25 ENCOUNTER — TELEMEDICINE (OUTPATIENT)
Dept: MEDICAL GROUP | Facility: IMAGING CENTER | Age: 84
End: 2024-01-25
Payer: MEDICARE

## 2024-01-25 VITALS — WEIGHT: 118 LBS | BODY MASS INDEX: 20.14 KG/M2 | TEMPERATURE: 98.6 F | HEIGHT: 64 IN

## 2024-01-25 DIAGNOSIS — U07.1 COVID-19 VIRUS INFECTION: ICD-10-CM

## 2024-01-25 PROCEDURE — 99213 OFFICE O/P EST LOW 20 MIN: CPT | Mod: 95

## 2024-01-25 ASSESSMENT — FIBROSIS 4 INDEX: FIB4 SCORE: 3.11

## 2024-01-25 ASSESSMENT — PATIENT HEALTH QUESTIONNAIRE - PHQ9: CLINICAL INTERPRETATION OF PHQ2 SCORE: 0

## 2024-01-25 NOTE — PROGRESS NOTES
Virtual Visit: Established Patient   This visit was conducted via Zoom using secure and encrypted videoconferencing technology.   The patient was in their home in the state of [place].    The patient's identity was confirmed and verbal consent was obtained for this virtual visit.    Subjective:   CC:   Chief Complaint   Patient presents with    Coronavirus Screening     Home test yesterday positive - symptoms began x 1 day  Pt report son symptoms began x 4 days and tested on Monday Positive        Mariama Gonzales is a 83 y.o. female presenting for evaluation and management of:    Covid-19 infection  Patient admits developing symptoms of headache, coughing, nasal and chest congestion, fatigue, and fever starting yesterday. She tested for covid yesterday. She was with her Son who tested positive for Covid 4 days ago.   She is covid vaccinated with boosters. She had Covid last year and took paxlovid.     ROS   Denies fevers, chills, lethargy, fatigue, malaise, facial swelling, visual changes, weakness, elevated heart rate, stiff neck, prolonged cough, persistent wheezing, leg swelling, trouble breathing, persistent pain or pressure in the chest, confusion, inability to wake or stay awake, bluish lips or face, persistent tachycardia (fast heart rate), prolonged dizziness, or fainting.        Current medicines (including changes today)  Current Outpatient Medications   Medication Sig Dispense Refill    Nirmatrelvir&Ritonavir 300/100 20 x 150 MG & 10 x 100MG Tablet Therapy Pack Take 300 mg nirmatrelvir (two 150 mg tablets) with 100 mg ritonavir (one 100 mg tablet) by mouth, with all three tablets taken together twice daily for 5 days. 30 Each 0    SYMBICORT 160-4.5 MCG/ACT Aerosol       fluvoxamine (LUVOX) 100 MG tablet       cetirizine (ZYRTEC) 10 MG Tab Take 10 mg by mouth every day.      albuterol 108 (90 Base) MCG/ACT Aero Soln inhalation aerosol Inhale 2 Puffs every 6 hours as needed for Shortness of Breath.  8.5 g 2    hyoscyamine (LEVSIN) 0.125 MG tablet Take 0.125 mg by mouth.      famotidine (PEPCID) 20 MG Tab       ALPRAZolam (XANAX) 0.25 MG Tab       azelastine (ASTELIN) 137 MCG/SPRAY nasal spray Administer 1 Spray into affected nostril(S) 2 times a day. 30 mL 2    diazePAM (VALIUM) 5 MG Tab Taking 2.5 at night PRN      vitamin D, Ergocalciferol, (DRISDOL) 25041 units Cap capsule Take  by mouth every 7 days.      Turmeric, Curcuma Longa, (CURCUMIN) Powder by Does not apply route.      BIOTIN 5000 PO Take  by mouth every day.      Multiple Vitamin (MULTIVITAMIN PO) Take  by mouth every day.      methylPREDNISolone (MEDROL DOSEPAK) 4 MG Tablet Therapy Pack Follow schedule on package instructions. 21 Tablet 0    celecoxib (CELEBREX) 100 MG Cap Take 1 Capsule by mouth every day. 100 Capsule 0    Risedronate Sodium 150 MG Tab Take 1 Tablet by mouth every 30 (thirty) days. 3 Tablet 3    triamterene/hctz (MAXZIDE-25/DYAZIDE) 37.5-25 MG Cap Take 1 Capsule by mouth 1 time a day as needed (swelling). (Patient not taking: Reported on 11/29/2023) 30 Capsule 2    Fluvoxamine Maleate 100 MG CAPSULE SR 24 HR Take 100 mg by mouth every bedtime.       No current facility-administered medications for this visit.       Patient Active Problem List    Diagnosis Date Noted    Decreased GFR 08/02/2023    Gastroesophageal reflux disease with esophagitis without hemorrhage 05/05/2023    Anxiety 05/05/2023    Weight loss 03/16/2023    Mitral valve prolapse 05/03/2022    Primary osteoarthritis involving multiple joints 04/22/2022    Polyarthralgia 04/04/2022    Cat allergies 06/10/2021    Dizziness 09/24/2019    Pure hypercholesterolemia 12/20/2018    Recurrent major depressive disorder, in partial remission (HCC) 06/26/2018    Fibromyalgia 05/17/2018    Age-related osteoporosis with current pathological fracture with routine healing 11/03/2017    Vitamin D deficiency 11/03/2017    Reactive airway disease without complication 06/29/2017     "Migraine without aura     Lupus (systemic lupus erythematosus) ([facility]) 07/14/2011    Sicca complex ([facility]) 07/14/2011    Aortic valve disease 07/14/2011    Lichen planus 07/14/2011        Objective:   Temp 37 °C (98.6 °F) Comment: 11/29/2023  Ht 1.626 m (5' 4\")   Wt 53.5 kg (118 lb)   BMI 20.25 kg/m²     Physical Exam:  Constitutional: Alert, no distress, well-groomed.  Skin: No rashes in visible areas.  Eye: Round. Conjunctiva clear, lids normal. No icterus.   ENMT: Lips pink without lesions, good dentition, moist mucous membranes. Phonation normal.  Neck: No masses, no thyromegaly. Moves freely without pain.  Respiratory: Unlabored respiratory effort, no cough or audible wheeze  Psych: Alert and oriented x3, normal affect and mood.     Assessment and Plan:   The following treatment plan was discussed:     1. COVID-19 virus infection  New, stable condition. No s/s of hypoxia or respiratory distress.  Discussed use Paxlovid antiviral treatment's potential to reduce risk of hospitalization and mortality. Side effects are diarrhea, hypertension, and muscle aches, altered taste discussed. Patient agreeable to this.   Advised patient to continue with her current regimen OTC antihistamine oral medication, Flonase nasal spray, Nasal flushes, and chloraseptic spray.   Recommend supportive measures including humidifier, warm salt water gargles, over-the-counter Cepacol throat lozenges, rest  and increased fluids.   May take OTC immune booster supplementation that contains Vitamin C, D, E, zinc, B vitamins.  Recommend supportive measures including humidifier, warm salt water gargles, over-the-counter Cepacol throat lozenges, rest  and increased fluids. Recommend to take Mucinex as needed during the day and use throat lozenges such as Ricola. Use dextromethorphan for cough only at night to allow the body to expel the pathogen during the day.  Take 5-10 deep breaths intermittently throughout the day and move the body as " tolerated to aid in respiration.  Patient was encouraged to seek treatment in the ER or urgent care for worsening symptoms, fever greater than 100.5, wheezes, shortness of breath, dyspnea, or syncope.    - Nirmatrelvir&Ritonavir 300/100 20 x 150 MG & 10 x 100MG Tablet Therapy Pack; Take 300 mg nirmatrelvir (two 150 mg tablets) with 100 mg ritonavir (one 100 mg tablet) by mouth, with all three tablets taken together twice daily for 5 days.  Dispense: 30 Each; Refill: 0    Medical Decision Making/Course:  In the course of preparing for this visit with review of the pertinent past medical history, recent and past clinic visits, current medications, and performing chart, immunization, medical history and medication reconciliation, and in the further course of obtaining the current history pertinent to the clinic visit today, performing an exam and evaluation, ordering and independently evaluating labs, tests, and/or procedures, prescribing any recommended new medications as noted above, providing any pertinent counseling and education and recommending further coordination of care. This was discussed with patient in a shared-decision making conversation, and they understand and agreed with plan of care.     Follow-up: Return if symptoms worsen or fail to improve.    Thank you, Arpita DWYER  [facility]

## 2024-01-26 NOTE — ASSESSMENT & PLAN NOTE
Reason for visit:  Olaf, a 27 year old male comes in today for a physical exam.    Concerns are as follows: hypertension - pt stopped taking his medication 6 months ago.  He denies any chest pain or shortness of breath.  No headache or visual changes.  No lower extremity edema.        Past Medical History:   Diagnosis Date   • Hypertension      Past Surgical History:   Procedure Laterality Date   • PAST SURGICAL HISTORY      negative     Medications:  Current Outpatient Prescriptions   Medication   • hydrochlorothiazide (HYDRODIURIL) 25 MG tablet   • losartan (COZAAR) 100 MG tablet     No current facility-administered medications for this visit.      ALLERGIES:  No Known Allergies    Family Status   Relation Status   • Mother Alive    healthy   • Father Alive    healthy   • Maternal Grandmother Alive    COPD   • Maternal Grandfather  at age 70's    Cancer - Liver, DM II   • Paternal Grandmother  at age late 60's    Cancer - ?   • Paternal Grandfather  at age 92    Alzheimers   • Sister Alive    healthy   • Sister Alive    healthy     Social Wellness/Health Habits:  Immunizations:  Tdap:  2008, Flu DUE, HPV DUE   Health screenings:  NA  Sun exposure: minimal  Last dental/eye exam: due for eye and dental exam.     Social History     Social History   • Marital status: Single     Spouse name: Gretchen   • Number of children: N/A   • Years of education: N/A     Occupational History   • [employer]     Social History Main Topics   • Smoking status: Never Smoker   • Smokeless tobacco: Never Used   • Alcohol use 0.5 oz/week     1 drink(s) per week   • Drug use: No   • Sexual activity: Yes     Partners: Female     Other Topics Concern   • Not on file     Social History Narrative     Review of Systems:   GENERAL: negative for  fever, chills, tiredness, malaise, weight loss, weight gain.  HEENT: negative for vision changes, hearing changes, epistaxis, sinus pressure and  Chronic problem. Does not take PPI as she could not tolerate it, caused GI discomfort.   Last edoscopy was at least 3 yrs ago.   Having more coughing and mucus secretion  Notes worsening buring   dysphagia  PULM: negative for  cough, shortness of breath, sputum, hemoptysis and wheezing  CV: negative for  chest pain, syncope, dyspnea on exertion, palpitation, orthopnea and edema.  GI: negative for   negative for nausea, vomiting, diarrhea, constipation, change in bowel habits, bleeding and reflux  : negative for  negative for incontinence, dysuria, hematuria, polyuria and discharge  MS: negative for  negative for pain, swelling, muscular weakness and redness.  Skin: negative for  rash, pruritis, lesions and jaundice  Neuro: negative for  negative for loss of conscience, altered mental status, seizures and sensory or motor deficits  Psych: negative for  negative for  anxiety, depression, agitation and dependencies  Otherwise negative for a 12-point system review, except for pertinent positives as noted above.      Physical Exam:  Vital signs:    Visit Vitals   • BP (!) 204/120   • Pulse 104   • Wt (!) 165.3 kg   • SpO2 99%   • BMI 48.08 kg/m2     General:  Alert and oriented, in no acute distress.  Mood and affect are within normal limits.  Maintains eye contact, answers questions appropriately.    Skin:  Warm, dry, intact without worrisome lesions (well defined borders, less than 6mm diameter, flat, color).  Head:  Normocephalic.  Normal hair texture and distribution.    Eyes:  PERRLA, red reflex present, sclera white, no lacrimal swelling or discharge bilaterally, 6 Cardinal fields intact.  Ears:  Bilaterally, auricles without lesion, minimal amount of cerumen, free of discharge or redness.  Tympanic membranes intact, pearly gray in color, with light reflex present.  Nose:  Nostrils patent, turbinates, pink and moist.  Sinus nontender to palpation.  Oral:  Teeth in good repair, without inflammation of the gums.  Pharynx:  No mass, lesions, erythema or edema.  Neck:  Supple, without masses and tenderness.  No lymphadenopathy, thyromegaly or nodules, carotid bruits.  Lungs:  Clear to auscultation bilaterally  from apex to bases.  Heart:  Normal S1 and S2 without murmurs or adventitious heart sounds.  Breasts:  Symmetrical without masses or tenderness on palpation.  Nipples bilaterally everted without discharge.  No axillary lymphadenopathy.    Abdomen:  Soft, nondistended, nontender, without palpable masses or hepatosplenomegaly.  :  Not performed  Rectal: Not performed  Extremities:  No clubbing, cyanosis or varicosities.  No upper/lower edema.  Gross neuro exam within normal limits.  Normal distal sensations, perfusion.  No ataxia or evidence of discoordination.     Education:  Discussed the following issues with the patient:  Regular dental/eye exams  Yearly physical    Assessment/Plan  Healthy 27 year old, with  1.  Health maintenance:  Up to date  2.  Discussed the importance of taking blood pressure medication (Pt stopped his medication 6 months ago).  Discussed the consequences of untreated blood pressure, including heart attack, stroke, peripheral vascular disease, kidney disease and eye disease  3.  Labs as ordered  4.  Declines flu shot  5.  Due for eye and dental exam.  Tdap is current.  Does ot wear seatbelts  6.  Pt to follow up in one month for recheck of blood pressure.  7.  Talked about diet and exercise modification to help with weight loss     normal...

## 2024-03-13 ENCOUNTER — OFFICE VISIT (OUTPATIENT)
Dept: MEDICAL GROUP | Facility: MEDICAL CENTER | Age: 84
End: 2024-03-13
Payer: MEDICARE

## 2024-03-13 VITALS
BODY MASS INDEX: 20.32 KG/M2 | OXYGEN SATURATION: 98 % | DIASTOLIC BLOOD PRESSURE: 58 MMHG | SYSTOLIC BLOOD PRESSURE: 110 MMHG | HEART RATE: 90 BPM | TEMPERATURE: 98 F | WEIGHT: 119 LBS | HEIGHT: 64 IN

## 2024-03-13 DIAGNOSIS — R53.82 CHRONIC FATIGUE: ICD-10-CM

## 2024-03-13 DIAGNOSIS — R76.8 ANTI-TPO ANTIBODIES PRESENT: ICD-10-CM

## 2024-03-13 DIAGNOSIS — E78.00 PURE HYPERCHOLESTEROLEMIA: ICD-10-CM

## 2024-03-13 DIAGNOSIS — R42 DIZZINESS: ICD-10-CM

## 2024-03-13 DIAGNOSIS — M81.0 AGE-RELATED OSTEOPOROSIS WITHOUT CURRENT PATHOLOGICAL FRACTURE: Primary | ICD-10-CM

## 2024-03-13 DIAGNOSIS — Z00.00 HEALTHCARE MAINTENANCE: ICD-10-CM

## 2024-03-13 DIAGNOSIS — R25.2 MUSCLE CRAMPS: ICD-10-CM

## 2024-03-13 DIAGNOSIS — Z86.39 HISTORY OF HYPOKALEMIA: ICD-10-CM

## 2024-03-13 DIAGNOSIS — E55.9 VITAMIN D DEFICIENCY: ICD-10-CM

## 2024-03-13 DIAGNOSIS — Z23 NEED FOR VACCINATION: ICD-10-CM

## 2024-03-13 PROCEDURE — 90471 IMMUNIZATION ADMIN: CPT | Performed by: STUDENT IN AN ORGANIZED HEALTH CARE EDUCATION/TRAINING PROGRAM

## 2024-03-13 PROCEDURE — 99214 OFFICE O/P EST MOD 30 MIN: CPT | Mod: 25 | Performed by: STUDENT IN AN ORGANIZED HEALTH CARE EDUCATION/TRAINING PROGRAM

## 2024-03-13 PROCEDURE — 3078F DIAST BP <80 MM HG: CPT | Performed by: STUDENT IN AN ORGANIZED HEALTH CARE EDUCATION/TRAINING PROGRAM

## 2024-03-13 PROCEDURE — 3074F SYST BP LT 130 MM HG: CPT | Performed by: STUDENT IN AN ORGANIZED HEALTH CARE EDUCATION/TRAINING PROGRAM

## 2024-03-13 PROCEDURE — 90697 DTAP-IPV-HIB-HEPB VACCINE IM: CPT | Performed by: STUDENT IN AN ORGANIZED HEALTH CARE EDUCATION/TRAINING PROGRAM

## 2024-03-13 RX ORDER — MULTIVIT-MIN/IRON/FOLIC ACID/K 18-600-40
CAPSULE ORAL
COMMUNITY

## 2024-03-13 ASSESSMENT — FIBROSIS 4 INDEX: FIB4 SCORE: 3.11

## 2024-03-13 NOTE — LETTER
[facility]  KELLIE Maldonado.  [address]  [facility] [address]  Fax: [phone]   Authorization for Release/Disclosure of   Protected Health Information   Name: DAVID JOHNSON : 1940 SSN: [id number]   Address: [address] [place] [id number] Phone:    [phone] (home)    I authorize the entity listed below to release/disclose the PHI below to:   [facility]/SOCRATES Maldonado and Marian Velázquez M.D.   Provider or Entity Name:     Address   City, State, Zip   Phone:      Fax:     Reason for request: continuity of care   Information to be released:    [  ] LAST COLONOSCOPY,  including any PATH REPORT and follow-up  [  ] LAST FIT/COLOGUARD RESULT [  ] LAST DEXA  [  ] LAST MAMMOGRAM  [  ] LAST PAP  [  ] LAST LABS [  ] RETINA EXAM REPORT  [  ] IMMUNIZATION RECORDS  [  ] Release all info      [  ] Check here and initial the line next to each item to release ALL health information INCLUDING  _____ Care and treatment for drug and / or alcohol abuse  _____ HIV testing, infection status, or AIDS  _____ Genetic Testing    DATES OF SERVICE OR TIME PERIOD TO BE DISCLOSED: _____________  I understand and acknowledge that:  * This Authorization may be revoked at any time by you in writing, except if your health information has already been used or disclosed.  * Your health information that will be used or disclosed as a result of you signing this authorization could be re-disclosed by the recipient. If this occurs, your re-disclosed health information may no longer be protected by State or Federal laws.  * You may refuse to sign this Authorization. Your refusal will not affect your ability to obtain treatment.  * This Authorization becomes effective upon signing and will  on (date) __________.      If no date is indicated, this Authorization will  one (1) year from the signature date.    Name: David Johnson  Signature: Date:   3/13/2024      PLEASE FAX REQUESTED RECORDS BACK TO: [phone]

## 2024-03-13 NOTE — PROGRESS NOTES
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   Patient with history of hip fractures and wrist fractures in the past.  Has tried Fosamax but had severe adverse reaction with pain and swelling of joints and muscle.  She is currently not on any bisphosphonates.  We had a brief discussion on Prolia but given her previous adverse reactions I would recommend a referral to endocrinology for further evaluation and management of osteoporosis     Dizziness    Currently resolved, stable   One episode of dizziness - around Thanks giving   Feeling weak, feeling cold and shivering and lightheaded  Seen in ER( [facility]) - work up was done.  Patient was found to have UTI as well as hypokalemia which was treated and her symptoms improved after few days.       Patient is requesting for annual lab work  Health Maintenance: Completed    ROS:  Review of Systems   Constitutional:  Negative for chills and fever.   HENT:  Negative for congestion, sore throat and tinnitus.    Respiratory:  Negative for cough and shortness of breath.    Cardiovascular:  Negative for chest pain and palpitations.   Gastrointestinal:  Negative for abdominal pain and heartburn.   Genitourinary:  Negative for dysuria and urgency.   Neurological:  Negative for dizziness, seizures and weakness. Tremors: .cc.  Psychiatric/Behavioral:  Negative for depression, substance abuse and suicidal ideas.        Review of systems unremarkable except for concerns noted by patient or items listed.    Please see HPI for additional ROS.      Objective:     Exam:  /58 (BP Location: Left arm, Patient Position: Sitting, BP Cuff Size: Adult)   Pulse 90   Temp 36.7 °C (98 °F) (Temporal)   Ht 1.626 m (5' 4\")   Wt 54 kg (119 " lb)   SpO2 98%   BMI 20.43 kg/m²  Body mass index is 20.43 kg/m².    Physical Exam  Constitutional:       Appearance: Normal appearance.   HENT:      Head: Normocephalic.   Eyes:      General: No scleral icterus.  Cardiovascular:      Rate and Rhythm: Normal rate and regular rhythm.      Pulses: Normal pulses.      Heart sounds: Normal heart sounds.   Pulmonary:      Effort: Pulmonary effort is normal.      Breath sounds: Normal breath sounds.   Musculoskeletal:      Right lower leg: No edema.      Left lower leg: No edema.   Skin:     General: Skin is warm.   Neurological:      Mental Status: She is alert and oriented to person, place, and time.   Psychiatric:         Mood and Affect: Mood normal.         Behavior: Behavior normal.             Labs: reviewed     Assessment & Plan:     83 y.o. female with the following -       1. Need for vaccination  Patient is due for tetanus booster.  Agrees to get vaccination today  - DTAP Vaccine <8YO IM    2. Age-related osteoporosis without current pathological fracture  This is a chronic problem  Uncontrolled  Last visit we tried bisphosphonates prescription.  Patient did not tolerate Fosamax and had an allergic reaction  Patient wants to pursue for other treatment options for osteoporosis.  Counseling and education provided.  Shared decision made to follow-up with specialist  Plan  - Referral to Endocrinology  -Continue calcium and vitamin D supplements recommended    3. Dizziness  Resolved  1 episode of dizziness.  Probably vasovagal episode due to dehydration low potassium  Plan  Recommended good hydration  Will repeat labs to check for electrolytes  - Comp Metabolic Panel; Future  - MAGNESIUM; Future    4. Vitamin D deficiency  Chronic, unclear status  Patient has history of vitamin D deficiency and osteoporosis  Recommended 2000 units of vitamin D daily and calcium supplement around 1200 mg once a day  Repeat vitamin D labs  - Cholecalciferol (VITAMIN D) 50 MCG (2000  UT) Cap; Take  by mouth.  - VITAMIN D,25 HYDROXY (DEFICIENCY); Future    5. Chronic fatigue  This is a chronic problem  Could be age-related.  But would like to rule out thyroid problem as well as B12 and hemoglobin levels checked  - CBC WITHOUT DIFFERENTIAL; Future  - TSH WITH REFLEX TO FT4; Future  - VITAMIN B12; Future    6. Anti-TPO antibodies present  Patient has history of anti-TPO antibodies positive.  Last thyroid function test was within normal range  Will repeat thyroid labs  - TSH WITH REFLEX TO FT4; Future    7. Pure hypercholesterolemia  Chronic, stable  Plan  Continue healthy low saturated fat diet and exercise  - Lipid Profile; Future    8.  History of hypokalemia  9. Muscle cramps  Patient with history of hypokalemia which was treated  Will recheck potassium levels with BMP and magnesium levels given patient is also complaining of muscle cramps  - MAGNESIUM; Future        Return in about 5 months (around 8/13/2024).    Please note that this dictation was created using voice recognition software. I have made every reasonable attempt to correct obvious errors, but I expect that there are errors of grammar and possibly content that I did not discover before finalizing the note.

## 2024-03-23 ASSESSMENT — ENCOUNTER SYMPTOMS
SORE THROAT: 0
FEVER: 0
ABDOMINAL PAIN: 0
CHILLS: 0
SHORTNESS OF BREATH: 0
HEARTBURN: 0
SEIZURES: 0
WEAKNESS: 0
DIZZINESS: 0
PALPITATIONS: 0
COUGH: 0
DEPRESSION: 0

## 2024-03-23 ASSESSMENT — LIFESTYLE VARIABLES: SUBSTANCE_ABUSE: 0

## 2024-04-05 ENCOUNTER — HOSPITAL ENCOUNTER (OUTPATIENT)
Dept: LAB | Facility: MEDICAL CENTER | Age: 84
End: 2024-04-05
Attending: STUDENT IN AN ORGANIZED HEALTH CARE EDUCATION/TRAINING PROGRAM
Payer: MEDICARE

## 2024-04-05 DIAGNOSIS — Z00.00 HEALTHCARE MAINTENANCE: ICD-10-CM

## 2024-04-05 DIAGNOSIS — E78.00 PURE HYPERCHOLESTEROLEMIA: ICD-10-CM

## 2024-04-05 DIAGNOSIS — R76.8 ANTI-TPO ANTIBODIES PRESENT: ICD-10-CM

## 2024-04-05 DIAGNOSIS — E55.9 VITAMIN D DEFICIENCY: ICD-10-CM

## 2024-04-05 DIAGNOSIS — Z86.39 HISTORY OF HYPOKALEMIA: ICD-10-CM

## 2024-04-05 DIAGNOSIS — R42 DIZZINESS: ICD-10-CM

## 2024-04-05 DIAGNOSIS — R25.2 MUSCLE CRAMPS: ICD-10-CM

## 2024-04-05 DIAGNOSIS — R53.82 CHRONIC FATIGUE: ICD-10-CM

## 2024-04-05 LAB
ERYTHROCYTE [DISTWIDTH] IN BLOOD BY AUTOMATED COUNT: 44.8 FL (ref 35.9–50)
HCT VFR BLD AUTO: 42.8 % (ref 37–47)
HGB BLD-MCNC: 13.8 G/DL (ref 12–16)
MCH RBC QN AUTO: 31.7 PG (ref 27–33)
MCHC RBC AUTO-ENTMCNC: 32.2 G/DL (ref 32.2–35.5)
MCV RBC AUTO: 98.4 FL (ref 81.4–97.8)
PLATELET # BLD AUTO: 210 K/UL (ref 164–446)
PMV BLD AUTO: 10.3 FL (ref 9–12.9)
RBC # BLD AUTO: 4.35 M/UL (ref 4.2–5.4)
WBC # BLD AUTO: 3.5 K/UL (ref 4.8–10.8)

## 2024-04-05 PROCEDURE — 84443 ASSAY THYROID STIM HORMONE: CPT

## 2024-04-05 PROCEDURE — 80061 LIPID PANEL: CPT

## 2024-04-05 PROCEDURE — 80053 COMPREHEN METABOLIC PANEL: CPT

## 2024-04-05 PROCEDURE — 82607 VITAMIN B-12: CPT

## 2024-04-05 PROCEDURE — 83735 ASSAY OF MAGNESIUM: CPT

## 2024-04-05 PROCEDURE — 82306 VITAMIN D 25 HYDROXY: CPT

## 2024-04-05 PROCEDURE — 85027 COMPLETE CBC AUTOMATED: CPT

## 2024-04-05 PROCEDURE — 36415 COLL VENOUS BLD VENIPUNCTURE: CPT

## 2024-04-06 LAB
25(OH)D3 SERPL-MCNC: 43 NG/ML (ref 30–100)
ALBUMIN SERPL BCP-MCNC: 4.2 G/DL (ref 3.2–4.9)
ALBUMIN/GLOB SERPL: 1.6 G/DL
ALP SERPL-CCNC: 53 U/L (ref 30–99)
ALT SERPL-CCNC: 12 U/L (ref 2–50)
ANION GAP SERPL CALC-SCNC: 13 MMOL/L (ref 7–16)
AST SERPL-CCNC: 27 U/L (ref 12–45)
BILIRUB SERPL-MCNC: 0.6 MG/DL (ref 0.1–1.5)
BUN SERPL-MCNC: 23 MG/DL (ref 8–22)
CALCIUM ALBUM COR SERPL-MCNC: 9.5 MG/DL (ref 8.5–10.5)
CALCIUM SERPL-MCNC: 9.7 MG/DL (ref 8.5–10.5)
CHLORIDE SERPL-SCNC: 104 MMOL/L (ref 96–112)
CHOLEST SERPL-MCNC: 226 MG/DL (ref 100–199)
CO2 SERPL-SCNC: 25 MMOL/L (ref 20–33)
CREAT SERPL-MCNC: 0.84 MG/DL (ref 0.5–1.4)
FASTING STATUS PATIENT QL REPORTED: NORMAL
GFR SERPLBLD CREATININE-BSD FMLA CKD-EPI: 69 ML/MIN/1.73 M 2
GLOBULIN SER CALC-MCNC: 2.7 G/DL (ref 1.9–3.5)
GLUCOSE SERPL-MCNC: 96 MG/DL (ref 65–99)
HDLC SERPL-MCNC: 75 MG/DL
LDLC SERPL CALC-MCNC: 137 MG/DL
MAGNESIUM SERPL-MCNC: 2.1 MG/DL (ref 1.5–2.5)
POTASSIUM SERPL-SCNC: 4.3 MMOL/L (ref 3.6–5.5)
PROT SERPL-MCNC: 6.9 G/DL (ref 6–8.2)
SODIUM SERPL-SCNC: 142 MMOL/L (ref 135–145)
TRIGL SERPL-MCNC: 70 MG/DL (ref 0–149)
TSH SERPL DL<=0.005 MIU/L-ACNC: 2.43 UIU/ML (ref 0.38–5.33)
VIT B12 SERPL-MCNC: 647 PG/ML (ref 211–911)

## 2024-05-16 ENCOUNTER — APPOINTMENT (RX ONLY)
Dept: URBAN - METROPOLITAN AREA CLINIC 6 | Facility: CLINIC | Age: 84
Setting detail: DERMATOLOGY
End: 2024-05-16

## 2024-05-16 DIAGNOSIS — L81.4 OTHER MELANIN HYPERPIGMENTATION: ICD-10-CM

## 2024-05-16 DIAGNOSIS — Z71.89 OTHER SPECIFIED COUNSELING: ICD-10-CM

## 2024-05-16 DIAGNOSIS — L82.1 OTHER SEBORRHEIC KERATOSIS: ICD-10-CM

## 2024-05-16 DIAGNOSIS — D22 MELANOCYTIC NEVI: ICD-10-CM

## 2024-05-16 DIAGNOSIS — L259 CONTACT DERMATITIS AND OTHER ECZEMA, UNSPECIFIED CAUSE: ICD-10-CM

## 2024-05-16 DIAGNOSIS — Z85.828 PERSONAL HISTORY OF OTHER MALIGNANT NEOPLASM OF SKIN: ICD-10-CM

## 2024-05-16 DIAGNOSIS — L57.0 ACTINIC KERATOSIS: ICD-10-CM

## 2024-05-16 DIAGNOSIS — D18.0 HEMANGIOMA: ICD-10-CM

## 2024-05-16 DIAGNOSIS — L82.0 INFLAMED SEBORRHEIC KERATOSIS: ICD-10-CM

## 2024-05-16 PROBLEM — L30.8 OTHER SPECIFIED DERMATITIS: Status: ACTIVE | Noted: 2024-05-16

## 2024-05-16 PROBLEM — D22.62 MELANOCYTIC NEVI OF LEFT UPPER LIMB, INCLUDING SHOULDER: Status: ACTIVE | Noted: 2024-05-16

## 2024-05-16 PROBLEM — D22.72 MELANOCYTIC NEVI OF LEFT LOWER LIMB, INCLUDING HIP: Status: ACTIVE | Noted: 2024-05-16

## 2024-05-16 PROBLEM — D22.5 MELANOCYTIC NEVI OF TRUNK: Status: ACTIVE | Noted: 2024-05-16

## 2024-05-16 PROBLEM — D22.61 MELANOCYTIC NEVI OF RIGHT UPPER LIMB, INCLUDING SHOULDER: Status: ACTIVE | Noted: 2024-05-16

## 2024-05-16 PROBLEM — D18.01 HEMANGIOMA OF SKIN AND SUBCUTANEOUS TISSUE: Status: ACTIVE | Noted: 2024-05-16

## 2024-05-16 PROBLEM — D22.71 MELANOCYTIC NEVI OF RIGHT LOWER LIMB, INCLUDING HIP: Status: ACTIVE | Noted: 2024-05-16

## 2024-05-16 PROCEDURE — 17000 DESTRUCT PREMALG LESION: CPT | Mod: 59

## 2024-05-16 PROCEDURE — 17003 DESTRUCT PREMALG LES 2-14: CPT | Mod: 59

## 2024-05-16 PROCEDURE — ? LIQUID NITROGEN

## 2024-05-16 PROCEDURE — 17110 DESTRUCTION B9 LES UP TO 14: CPT

## 2024-05-16 PROCEDURE — 99213 OFFICE O/P EST LOW 20 MIN: CPT | Mod: 25

## 2024-05-16 PROCEDURE — ? COUNSELING

## 2024-05-16 ASSESSMENT — LOCATION DETAILED DESCRIPTION DERM
LOCATION DETAILED: LEFT DISTAL POSTERIOR THIGH
LOCATION DETAILED: LEFT DISTAL DORSAL FOREARM
LOCATION DETAILED: LEFT PROXIMAL DORSAL FOREARM
LOCATION DETAILED: LEFT PROXIMAL CALF
LOCATION DETAILED: RIGHT ANTERIOR PROXIMAL THIGH
LOCATION DETAILED: RIGHT ANTERIOR SHOULDER
LOCATION DETAILED: LEFT ANTERIOR DISTAL UPPER ARM
LOCATION DETAILED: RIGHT INFERIOR LATERAL NECK
LOCATION DETAILED: LEFT CENTRAL MALAR CHEEK
LOCATION DETAILED: LEFT INFERIOR LATERAL MALAR CHEEK
LOCATION DETAILED: LEFT ANTERIOR PROXIMAL THIGH
LOCATION DETAILED: RIGHT MEDIAL INFERIOR CHEST
LOCATION DETAILED: LEFT INFERIOR UPPER BACK
LOCATION DETAILED: RIGHT MEDIAL SUPERIOR CHEST
LOCATION DETAILED: RIGHT PROXIMAL CALF
LOCATION DETAILED: RIGHT VENTRAL PROXIMAL FOREARM
LOCATION DETAILED: LEFT DISTAL PRETIBIAL REGION
LOCATION DETAILED: RIGHT DISTAL POSTERIOR THIGH
LOCATION DETAILED: RIGHT CENTRAL TEMPLE
LOCATION DETAILED: LEFT VENTRAL DISTAL FOREARM
LOCATION DETAILED: RIGHT PROXIMAL DORSAL FOREARM
LOCATION DETAILED: LEFT FOREHEAD
LOCATION DETAILED: RIGHT SUPERIOR LATERAL MIDBACK
LOCATION DETAILED: RIGHT CLAVICULAR NECK
LOCATION DETAILED: RIGHT ANTERIOR DISTAL UPPER ARM
LOCATION DETAILED: RIGHT CENTRAL BUCCAL CHEEK
LOCATION DETAILED: LEFT MEDIAL SUPERIOR CHEST
LOCATION DETAILED: RIGHT VENTRAL DISTAL FOREARM
LOCATION DETAILED: RIGHT MID-UPPER BACK
LOCATION DETAILED: LEFT SUPERIOR FOREHEAD

## 2024-05-16 ASSESSMENT — LOCATION ZONE DERM
LOCATION ZONE: ARM
LOCATION ZONE: LEG
LOCATION ZONE: TRUNK
LOCATION ZONE: FACE
LOCATION ZONE: NECK

## 2024-05-16 ASSESSMENT — LOCATION SIMPLE DESCRIPTION DERM
LOCATION SIMPLE: LEFT PRETIBIAL REGION
LOCATION SIMPLE: RIGHT THIGH
LOCATION SIMPLE: RIGHT UPPER BACK
LOCATION SIMPLE: RIGHT FOREARM
LOCATION SIMPLE: LEFT FOREHEAD
LOCATION SIMPLE: RIGHT CHEEK
LOCATION SIMPLE: LEFT FOREARM
LOCATION SIMPLE: RIGHT ANTERIOR NECK
LOCATION SIMPLE: LEFT CALF
LOCATION SIMPLE: LEFT POSTERIOR THIGH
LOCATION SIMPLE: RIGHT POSTERIOR THIGH
LOCATION SIMPLE: LEFT CHEEK
LOCATION SIMPLE: CHEST
LOCATION SIMPLE: RIGHT TEMPLE
LOCATION SIMPLE: LEFT UPPER ARM
LOCATION SIMPLE: RIGHT CALF
LOCATION SIMPLE: RIGHT LOWER BACK
LOCATION SIMPLE: RIGHT UPPER ARM
LOCATION SIMPLE: RIGHT SHOULDER
LOCATION SIMPLE: LEFT THIGH
LOCATION SIMPLE: LEFT UPPER BACK

## 2024-05-16 NOTE — PROCEDURE: COUNSELING
Detail Level: Detailed
Detail Level: Zone
Detail Level: Generalized
Moisturizer Recommendations: Cetaphil cream
Detail Level: Simple

## 2024-05-23 ENCOUNTER — APPOINTMENT (OUTPATIENT)
Dept: MEDICAL GROUP | Facility: MEDICAL CENTER | Age: 84
End: 2024-05-23
Payer: MEDICARE

## 2024-06-17 DIAGNOSIS — I10 ESSENTIAL HYPERTENSION: ICD-10-CM

## 2024-06-17 DIAGNOSIS — R60.0 LOWER EXTREMITY EDEMA: ICD-10-CM

## 2024-06-18 RX ORDER — TRIAMTERENE AND HYDROCHLOROTHIAZIDE 37.5; 25 MG/1; MG/1
1 CAPSULE ORAL
Qty: 90 CAPSULE | Refills: 2 | Status: SHIPPED | OUTPATIENT
Start: 2024-06-18

## 2024-09-06 ENCOUNTER — APPOINTMENT (OUTPATIENT)
Dept: MEDICAL GROUP | Facility: MEDICAL CENTER | Age: 84
End: 2024-09-06
Payer: MEDICARE

## 2024-09-06 VITALS
SYSTOLIC BLOOD PRESSURE: 108 MMHG | WEIGHT: 116.18 LBS | HEART RATE: 67 BPM | OXYGEN SATURATION: 98 % | HEIGHT: 64 IN | DIASTOLIC BLOOD PRESSURE: 68 MMHG | BODY MASS INDEX: 19.84 KG/M2 | TEMPERATURE: 98.5 F

## 2024-09-06 DIAGNOSIS — K21.00 GASTROESOPHAGEAL REFLUX DISEASE WITH ESOPHAGITIS WITHOUT HEMORRHAGE: Chronic | ICD-10-CM

## 2024-09-06 DIAGNOSIS — R53.83 FATIGUE, UNSPECIFIED TYPE: ICD-10-CM

## 2024-09-06 DIAGNOSIS — M32.8 OTHER FORMS OF SYSTEMIC LUPUS ERYTHEMATOSUS, UNSPECIFIED ORGAN INVOLVEMENT STATUS (HCC): Chronic | ICD-10-CM

## 2024-09-06 DIAGNOSIS — M80.00XD AGE-RELATED OSTEOPOROSIS WITH CURRENT PATHOLOGICAL FRACTURE WITH ROUTINE HEALING: Chronic | ICD-10-CM

## 2024-09-06 DIAGNOSIS — I35.9 AORTIC VALVE DISEASE: ICD-10-CM

## 2024-09-06 DIAGNOSIS — K31.89 SPASM OF GASTROINTESTINAL TRACT: ICD-10-CM

## 2024-09-06 DIAGNOSIS — M48.062 SPINAL STENOSIS OF LUMBAR REGION WITH NEUROGENIC CLAUDICATION: ICD-10-CM

## 2024-09-06 DIAGNOSIS — F41.9 ANXIETY: ICD-10-CM

## 2024-09-06 DIAGNOSIS — R79.89 ABNORMAL CBC: ICD-10-CM

## 2024-09-06 DIAGNOSIS — J45.30 MILD PERSISTENT REACTIVE AIRWAY DISEASE WITHOUT COMPLICATION: Chronic | ICD-10-CM

## 2024-09-06 DIAGNOSIS — F33.41 RECURRENT MAJOR DEPRESSIVE DISORDER, IN PARTIAL REMISSION (HCC): Chronic | ICD-10-CM

## 2024-09-06 DIAGNOSIS — M35.00 SJOGREN'S SYNDROME, WITH UNSPECIFIED ORGAN INVOLVEMENT (HCC): ICD-10-CM

## 2024-09-06 DIAGNOSIS — Z00.00 MEDICARE ANNUAL WELLNESS VISIT, SUBSEQUENT: Primary | ICD-10-CM

## 2024-09-06 PROBLEM — M81.0 AGE-RELATED OSTEOPOROSIS WITHOUT CURRENT PATHOLOGICAL FRACTURE: Status: RESOLVED | Noted: 2024-03-13 | Resolved: 2024-09-06

## 2024-09-06 PROBLEM — M48.061 LUMBAR STENOSIS: Status: ACTIVE | Noted: 2024-09-06

## 2024-09-06 PROCEDURE — G0439 PPPS, SUBSEQ VISIT: HCPCS | Performed by: BEHAVIOR ANALYST

## 2024-09-06 PROCEDURE — 3074F SYST BP LT 130 MM HG: CPT | Performed by: BEHAVIOR ANALYST

## 2024-09-06 PROCEDURE — 3078F DIAST BP <80 MM HG: CPT | Performed by: BEHAVIOR ANALYST

## 2024-09-06 RX ORDER — HYOSCYAMINE SULFATE 0.125 MG
0.12 TABLET ORAL EVERY 6 HOURS PRN
Qty: 120 TABLET | Refills: 1 | Status: SHIPPED | OUTPATIENT
Start: 2024-09-06

## 2024-09-06 ASSESSMENT — FIBROSIS 4 INDEX: FIB4 SCORE: 3.08

## 2024-09-06 ASSESSMENT — ACTIVITIES OF DAILY LIVING (ADL): BATHING_REQUIRES_ASSISTANCE: 0

## 2024-09-06 ASSESSMENT — PATIENT HEALTH QUESTIONNAIRE - PHQ9: CLINICAL INTERPRETATION OF PHQ2 SCORE: 0

## 2024-09-06 ASSESSMENT — ENCOUNTER SYMPTOMS: GENERAL WELL-BEING: GOOD

## 2024-09-06 NOTE — PROGRESS NOTES
Chief Complaint   Patient presents with    Medicare Annual Wellness       HPI:  Mariama Gonzales is a 84 y.o. here for Medicare Annual Wellness Visit     Problem   Spasm of Gastrointestinal Tract    PRN hyoscyamine effective , previously managed by Dr. Owens     Lumbar Stenosis    MRI 12/2023 showing severe stenosis. Previously had epidural injections that improved back pain. Currently pain down right leg with weakness after activity.      Gastroesophageal Reflux Disease With Esophagitis Without Hemorrhage    Upper endoscopy with GI consultants 4/26/2023, she has mild reflux esophagitis and Candida of the esophagus.  She completed a 10-day course of fluconazole. She was previous on chronic steroids for 18 years and has been off for 30 years for lupus.   She no longer takes Pepcid daily as she no longer thinks it is effective.  For spasms she uses hyoscyamine       Recurrent Major Depressive Disorder, in Partial Remission (Hcc)    Long, cold winter for her. She takes fluvaxemine. Managed by psychiatry- Dr. Jasmin Puente     Lupus (Systemic Lupus Erythematosus) (Hcc)    Chronic problem. Long history of SLE. Saw Dr. Bernal here at [facility] but feels she did not have a good experience. She is not pleased with her care at this point.   Gets intermittent flares, butterfly rash, swollen joints, and pain.      Sjogren's Disease (Hcc)    Chronic problem. Uses eye drops, lozenges and special mouth wash.     Aortic Valve Disease    Followed by Dr. Umana now.  Last echocardiogram 2023 with mild AI and mild MR.    Prescribed triamterene-hydrochlorothiazide 37.5-25 mg as needed for swelling in her ankle. Rarely uses.      Age-Related Osteoporosis Without Current Pathological Fracture (Resolved)    osteoporotic in the right distal forearm and lumbar spine.   Patient with history of hip fractures and wrist fractures in the past.  Has tried Fosamax but had severe adverse reaction with pain and swelling of joints and muscle.   She is currently not on any bisphosphonates.  We had a brief discussion on Prolia but given her previous adverse reactions I would recommend a referral to endocrinology for further evaluation and management of osteoporosis          History of Present Illness  The patient is an 84-year-old female established patient here for her annual exam.    She reports having contracted COVID-19 three or four times this year, with the most recent infection occurring approximately 8 weeks ago. She continues to experience residual symptoms such as weakness, fatigue, and a persistent cough, which she likens to her reflux cough. Additionally, she mentions a sensation of being underwater, a symptom she has not previously experienced. She has been taking an unspecified allergy medication and experiences constant phlegm due to her reflux. She has not tried nasal saline washes or sprays. Her sleep is occasionally disturbed by her cough, but her primary concern is her overall feeling of weakness and fatigue.    She was prescribed a medication for osteoporosis during her last visit over a year ago, but she did not tolerate it well. She was referred to Dr. Velázquez in March 2024, who then referred her to endocrinology. However, she has been unable to establish contact with them. She has been taking Citracal twice daily as recommended. She also requests a refill of her Levsin prescription, which she finds effective for upper abdominal spasms. She does not take Pepcid regularly, but occasionally uses Tums.    Her walking has decreased due to pain in her right leg, which she attributes to her sciatic joint. She has a treadmill at home and tries to use it as much as possible. She sought treatment from [facility], who referred her for an epidural, but she has not received a response. She is now seeking another provider for the epidural. She used to receive regular trigger point injections from anesthesiologist Graham Renteria, but has not had any since his  [facility]. An MRI revealed some stenosis. She experiences weakness after walking for a while, but the primary issue is pain.     She experiences intermittent flares of lupus, but has not had a severe flare in about 2 years. The flares are accompanied by rashes, swollen joints, and muscle pain.\She uses over-the-counter eye drops for dry eyes and Biotene mouthwash for dry mouth.     She saw Dr. Ramon last year for her mitral valve, which was stable. She rarely takes Maxzide PRN and drinks plenty of water. She experiences high anxiety at times, but her depression is manageable. She takes half a pill of Valium at night when her muscles and joints are sore, which helps her lie flat. She takes Xanax infrequently, but finds it helpful. She continues to see her psychiatrist, Dr. Jasmin Puente, and takes fluvoxamine. She uses Symbicort almost daily and finds it helpful.       Patient Active Problem List    Diagnosis Date Noted    Spasm of gastrointestinal tract 09/06/2024    Lumbar stenosis 09/06/2024    Decreased GFR 08/02/2023    Gastroesophageal reflux disease with esophagitis without hemorrhage 05/05/2023    Anxiety 05/05/2023    Weight loss 03/16/2023    Mitral valve prolapse 05/03/2022    Primary osteoarthritis involving multiple joints 04/22/2022    Polyarthralgia 04/04/2022    Cat allergies 06/10/2021    Dizziness 09/24/2019    Pure hypercholesterolemia 12/20/2018    Recurrent major depressive disorder, in partial remission (HCC) 06/26/2018    Fibromyalgia 05/17/2018    Age-related osteoporosis with current pathological fracture with routine healing 11/03/2017    Vitamin D deficiency 11/03/2017    Reactive airway disease without complication 06/29/2017    Migraine without aura     Lupus (systemic lupus erythematosus) ([facility]) 07/14/2011    Sjogren's disease ([facility]) 07/14/2011    Aortic valve disease 07/14/2011    Lichen planus 07/14/2011       Current Outpatient Medications   Medication Sig Dispense Refill     hyoscyamine (LEVSIN) 0.125 MG tablet Take 1 Tablet by mouth every 6 hours as needed for Cramping. 120 Tablet 1    triamterene/hctz (MAXZIDE-25/DYAZIDE) 37.5-25 MG Cap Take 1 Capsule by mouth 1 time a day as needed (swelling). 90 Capsule 2    Cholecalciferol (VITAMIN D) 50 MCG (2000 UT) Cap Take  by mouth.      SYMBICORT 160-4.5 MCG/ACT Aerosol       fluvoxamine (LUVOX) 100 MG tablet       albuterol 108 (90 Base) MCG/ACT Aero Soln inhalation aerosol Inhale 2 Puffs every 6 hours as needed for Shortness of Breath. 8.5 g 2    ALPRAZolam (XANAX) 0.25 MG Tab       azelastine (ASTELIN) 137 MCG/SPRAY nasal spray Administer 1 Spray into affected nostril(S) 2 times a day. 30 mL 2    diazePAM (VALIUM) 5 MG Tab Taking 2.5 at night PRN      Turmeric, Curcuma Longa, (CURCUMIN) Powder by Does not apply route.      Multiple Vitamin (MULTIVITAMIN PO) Take  by mouth every day.       No current facility-administered medications for this visit.          Current supplements as per medication list.     Allergies: Cats claw, uncaria tomentosa; Ciprofloxacin; Methotrexate; Other drug; Shellfish allergy; Sulfa drugs; and Wheat    Current social contact/activities: Goes to lunch with friends and spends time with children.      She  reports that she quit smoking about 46 years ago. Her smoking use included cigarettes. She started smoking about 80 years ago. She quit smokeless tobacco use about 46 years ago. She reports current alcohol use. She reports that she does not use drugs.  Counseling given: Not Answered      ROS:    Gait: Uses no assistive device  Ostomy: No  Other tubes: No  Amputations: No  Chronic oxygen use: No  Last eye exam: 3 years ago   Wears hearing aids: No   : Denies any urinary leakage during the last 6 months    Screening:  Depression Screening  Little interest or pleasure in doing things?  0 - not at all  Feeling down, depressed , or hopeless? 0 - not at all  Trouble falling or staying asleep, or sleeping too much?      Feeling tired or having little energy?     Poor appetite or overeating?     Feeling bad about yourself - or that you are a failure or have let yourself or your family down?    Trouble concentrating on things, such as reading the newspaper or watching television?    Moving or speaking so slowly that other people could have noticed.  Or the opposite - being so fidgety or restless that you have been moving around a lot more than usual?     Thoughts that you would be better off dead, or of hurting yourself?     Patient Health Questionnaire Score:      If depressive symptoms identified deferred to follow up visit unless specifically addressed in assessment and plan.    Interpretation of PHQ-9 Total Score   Score Severity   1-4 No Depression   5-9 Mild Depression   10-14 Moderate Depression   15-19 Moderately Severe Depression   20-27 Severe Depression    Screening for Cognitive Impairment  Do you or any of your friends or family members have any concern about your memory? No  Three Minute Recall (Leader, Season, Table) 3/3    Eduin clock face with all 12 numbers and set the hands to show 10 minutes after 11.  Yes    Cognitive concerns identified deferred for follow up unless specifically addressed in assessment and plan.    Fall Risk Assessment  Has the patient had two or more falls in the last year or any fall with injury in the last year?  No    Safety Assessment  Do you always wear your seatbelt?  Yes  Any changes to home needed to function safely? No  Difficulty hearing.  No  Patient counseled about all safety risks that were identified.    Functional Assessment ADLs  Are there any barriers preventing you from cooking for yourself or meeting nutritional needs?  No.    Are there any barriers preventing you from driving safely or obtaining transportation?  No.    Are there any barriers preventing you from using a telephone or calling for help?  No    Are there any barriers preventing you from shopping?  No.    Are  there any barriers preventing you from taking care of your own finances?  No    Are there any barriers preventing you from managing your medications?  No    Are there any barriers preventing you from showering, bathing or dressing yourself? No    Are there any barriers preventing you from doing housework or laundry? No    Are there any barriers preventing you from using the toilet?No    Are you currently engaging in any exercise or physical activity?  Yes. Walk and trid mil -    Self-Assessment of Health  What is your perception of your health? Good    Do you sleep more than six hours a night? No    In the past 7 days, how much did pain keep you from doing your normal work? Some    Do you spend quality time with family or friends (virtually or in person)? Yes    Do you usually eat a heart healthy diet that constists of a variety of fruits, vegetables, whole grains and fiber? Yes    Do you eat foods high in fat and/or Fast Food more than three times per week? No    How concerned are you that your medical conditions are not being well managed? Not at all    Are you worried that in the next 2 months, you may not have stable housing that you own, rent, or stay in as part of a household? No        Advance Care Planning  Do you have an Advance Directive, Living Will, Durable Power of , or POLST? Yes          is on file      Health Maintenance Summary            Overdue - Zoster (Shingles) Vaccines (2 of 3) Overdue since 2/29/2012 01/04/2012  Imm Admin: Zoster Vaccine Live (ZVL) (Zostavax) - HISTORICAL DATA              Overdue - Influenza Vaccine (1) Overdue since 9/1/2024 09/28/2023  Imm Admin: Influenza Vaccine Adult HD    10/06/2022  Imm Admin: Influenza Vaccine Adult HD    09/18/2021  Imm Admin: Influenza Vaccine Adult HD    09/11/2021  Imm Admin: Influenza, Unspecified - HISTORICAL DATA    08/25/2020  Imm Admin: Influenza Vaccine Quad Inj (Pf)    Only the first 5 history entries have been loaded,  but more history exists.              Overdue - COVID-19 Vaccine (6 - 2023-24 season) Overdue since 9/1/2024 09/28/2023  Imm Admin: Comirnaty (Covid-19 Vaccine, Mrna, 7714-1434 Formula)    10/20/2022  Imm Admin: MODERNA BIVALENT BOOSTER SARS-COV-2 VACCINE (6+)    05/19/2022  Imm Admin: MODERNA SARS-COV-2 VACCINE (12+)    08/30/2021  Imm Admin: PFIZER PURPLE CAP SARS-COV-2 VACCINATION (12+)    02/04/2021  Imm Admin: PFIZER PURPLE CAP SARS-COV-2 VACCINATION (12+)    Only the first 5 history entries have been loaded, but more history exists.              Annual Wellness Visit (Yearly) Next due on 9/6/2025 09/06/2024  Level of Service: CO ANNUAL WELLNESS VISIT-INCLUDES PPPS SUBSEQUE*    09/06/2024  Visit Dx: Medicare annual wellness visit, subsequent    05/10/2022  Done              Bone Density Scan (Every 5 Years) Next due on 9/11/2028 09/11/2023  DS-BONE DENSITY STUDY (DEXA)    03/08/2019  DS-BONE DENSITY STUDY (DEXA)    12/03/2010  DS-BONE DENSITY STUDY (DEXA)              IMM DTaP/Tdap/Td Vaccine (2 - Tdap) Next due on 3/13/2034      03/13/2024  Imm Admin: Dtap Vaccine    08/06/2010  Imm Admin: TD Vaccine    04/22/2006  Imm Admin: TD Vaccine              Hepatitis A Vaccine (Hep A) (Series Information) Aged Out      12/16/2013  Imm Admin: Hep A/HEP B Combined Vaccine (TwinRix)    11/16/2013  Imm Admin: Hep A/HEP B Combined Vaccine (TwinRix)              Pneumococcal Vaccine: 65+ Years (Series Information) Completed      11/24/2015  Imm Admin: Pneumococcal Conjugate Vaccine (Prevnar/PCV-13)    12/28/2012  Imm Admin: Pneumococcal polysaccharide vaccine (PPSV-23)              HPV Vaccines (Series Information) Aged Out      No completion history exists for this topic.              Polio Vaccine (Inactivated Polio) (Series Information) Aged Out      No completion history exists for this topic.              Meningococcal Immunization (Series Information) Aged Out      No completion history exists for this  topic.              Discontinued - Mammogram  Discontinued        Frequency changed to Never automatically (Topic No Longer Applies)    2019  MA-SCREEN MAMMO W/CAD-BILAT    2013  MA-BILAT DIAGNOSTIC MAMMO W/CAD    2012  MA-SCREENING MAMMOGRAM W/ CAD    2010  MA-SCREEING MAMMOGRAM W/ CAD    Only the first 5 history entries have been loaded, but more history exists.              Discontinued - Hepatitis B Vaccine (Hep B)  Discontinued      2013  Imm Admin: Hep A/HEP B Combined Vaccine (TwinRix)    2013  Imm Admin: Hep A/HEP B Combined Vaccine (TwinRix)    2012  Imm Admin: Hepatitis B Vaccine (Adol/Adult)                    Patient Care Team:  SOCRATES Maldonado as PCP - General (Nurse Practitioner Family)  Carlos Horton M.D. as Consulting Physician (Dermatology)  Olga Garrett M.D. as Consulting Physician (Internal Medicine)      Social History     Tobacco Use    Smoking status: Former     Current packs/day: 0.00     Types: Cigarettes     Start date: 1944     Quit date: 1978     Years since quittin.6    Smokeless tobacco: Former     Quit date: 1978   Vaping Use    Vaping status: Never Used   Substance Use Topics    Alcohol use: Yes     Comment: Rarely    Drug use: No     Family History   Problem Relation Age of Onset    Arthritis Mother     Cancer Father     Heart Disease Father     Hypertension Father     Diabetes Maternal Aunt     Cancer Maternal Uncle     Heart Disease Maternal Grandmother     Diabetes Maternal Grandfather     Genetic Disorder Paternal Grandmother     Heart Disease Paternal Grandfather     Hyperlipidemia Paternal Grandfather     Diabetes Maternal Aunt     Genetic Disorder Maternal Uncle     Cancer Paternal Aunt     Cancer Paternal Aunt     Cancer Paternal Uncle      She  has a past medical history of Allergy, unspecified not elsewhere classified, Anxiety, Arthritis, Gtz's esophagus without dysplasia (2017),  "Blood transfusion, without reported diagnosis, CATARACT, Dental disorder, Depression, Fibromyalgia, GERD (gastroesophageal reflux disease), GERD (gastroesophageal reflux disease), Headache(784.0), Headache, classical migraine, Kidney calculi, Lichen planus, Lupus (HCC), Migraine without aura, without mention of intractable migraine without mention of status migrainosus, Mitral valve prolapse, Osteoporosis, unspecified, Other convulsions, Pain, Psychiatric problem, Shingles (10/4/11), Sjogren's syndrome (HCC), Snoring, Unspecified asthma(493.90), and Urinary tract infection, site not specified.   Past Surgical History:   Procedure Laterality Date    HIP CANNULATED SCREW  1/28/2014    Performed by Cristian Delgado M.D. at SURGERY [facility] ORS    CLOSED REDUCTION  8/6/2012    Performed by CRISTIAN DELGADO at SURGERY [facility] ORS    HIP CANNULATED SCREW  8/6/2012    Performed by CRISTIAN DELGADO at [facility] ORS    SHOULDER DECOMPRESSION ARTHROSCOPIC  10/6/2011    Performed by LARY CAR at [facility] ORS    CLAVICLE DISTAL EXCISION  10/6/2011    Performed by LARY CAR at [facility] ORS    OTHER  2007    reconstruction of shoulder to cover chest wall    MAMMOPLASTY AUGMENTATION  1988    removed, post op complications    OTHER  1988    17 reconstruction surgeries post mammoplasty    MASTECTOMY  1972    b/l    BREAST BIOPSY  1970's    x 5    ABDOMINAL EXPLORATION      EYE SURGERY      OPEN REDUCTION         Exam:   /68 (BP Location: Right arm, Patient Position: Sitting, BP Cuff Size: Adult)   Pulse 67   Temp 36.9 °C (98.5 °F) (Temporal)   Ht 1.626 m (5' 4\")   Wt 52.7 kg (116 lb 2.9 oz)   SpO2 98%  Body mass index is 19.94 kg/m².    Hearing good.    Dentition good  Alert, oriented in no acute distress.  Eye contact is good, speech goal directed, affect calm  Physical Exam  Constitutional:       Appearance: Normal appearance.   HENT:      Mouth/Throat:      " Mouth: Mucous membranes are dry.      Pharynx: No oropharyngeal exudate or posterior oropharyngeal erythema.   Cardiovascular:      Rate and Rhythm: Normal rate and regular rhythm.      Pulses: Normal pulses.      Heart sounds: Murmur heard.   Pulmonary:      Effort: Pulmonary effort is normal.      Breath sounds: Examination of the right-lower field reveals wheezing. Wheezing present.   Musculoskeletal:      Cervical back: Normal range of motion and neck supple.   Neurological:      Mental Status: She is alert.          Assessment and Plan. The following treatment and monitoring plan is recommended:    1. Medicare annual wellness visit, subsequent    Services suggested: No services needed at this time  Health Care Screening: Age-appropriate preventive services recommended by USPTF and ACIP covered by Medicare were discussed today. Services ordered if indicated and agreed upon by the patient.  Referrals offered: Community-based lifestyle interventions to reduce health risks and promote self-management and wellness, fall prevention, nutrition, physical activity, tobacco-use cessation, weight loss, and mental health services as per orders if indicated.    Discussion today about general wellness and lifestyle habits:    Prevent falls and reduce trip hazards; Cautioned about securing or removing rugs.  Have a working fire alarm and carbon monoxide detector;   Engage in regular physical activity and social activities     2. Abnormal CBC  -Obtain CBC if no improvement in her fatigue in 2 to 3 weeks  -- CBC WITH DIFFERENTIAL; Future    3. Fatigue, unspecified type  -Post COVID.  Could be multifactorial related to her autoimmune diseases as well as continued sinus congestion.  -- CBC WITH DIFFERENTIAL; Future    4. Age-related osteoporosis with current pathological fracture with routine healing  -She has been referred to endocrinology.  She will contact them to schedule appointment.    5. Sjogren's syndrome, with  unspecified organ involvement (HCC)  - Referral to Rheumatology    6. Gastroesophageal reflux disease with esophagitis without hemorrhage  -Doing well with as needed Tums    7. Spasm of gastrointestinal tract  -Chronic, intermittent problem.  Has not follow-up with GI but would like to continue hyoscyamine as it has been very helpful as needed for gastrointestinal spasms.  - hyoscyamine (LEVSIN) 0.125 MG tablet; Take 1 Tablet by mouth every 6 hours as needed for Cramping.  Dispense: 120 Tablet; Refill: 1    8. Recurrent major depressive disorder, in partial remission (HCC)  -Continue management with psychiatry    9. Spinal stenosis of lumbar region with neurogenic claudication  -Chronic, uncontrolled problem.  Affecting her ability to walk and exercise.  Has had positive outcomes with epidural steroid injections.  MRI completed 12/2023  - Referral to Pain Clinic    10. Other forms of systemic lupus erythematosus, unspecified organ involvement status (HCC)  - Referral to Rheumatology    11. Aortic valve disease  -Continue management with cardiology    12. Anxiety  -On chronic benzos.  Continue management psychiatry    13. Mild persistent reactive airway disease without complication  -She has some wheezing on exam today.  Recommend that she use her Symbicort daily and as needed albuterol.  - - Recommended Twice daily nasal saline irrigation and then 1 spray of Flonase or Nasocort post saline irrigation.          Follow-up: Return in about 3 months (around 12/6/2024) for Symptoms check, med check.

## 2024-09-06 NOTE — PATIENT INSTRUCTIONS
- Recommended Twice daily nasal saline irrigation and then 1 spray of Flonase or Nasocort post saline irrigation.

## 2024-09-09 DIAGNOSIS — J45.20 MILD INTERMITTENT REACTIVE AIRWAY DISEASE WITHOUT COMPLICATION: ICD-10-CM

## 2024-09-09 NOTE — TELEPHONE ENCOUNTER
Received request via: Patient    Was the patient seen in the last year in this department? Yes    Does the patient have an active prescription (recently filled or refills available) for medication(s) requested? No    Pharmacy Name: [facility]    Does the patient have intermediate Plus and need 100-day supply? (This applies to ALL medications) Patient does not have SCP

## 2024-09-10 RX ORDER — BUDESONIDE AND FORMOTEROL FUMARATE DIHYDRATE 160; 4.5 UG/1; UG/1
1 AEROSOL RESPIRATORY (INHALATION) 2 TIMES DAILY
Qty: 10.2 G | Refills: 3 | Status: SHIPPED | OUTPATIENT
Start: 2024-09-10 | End: 2024-09-11 | Stop reason: SDUPTHER

## 2024-09-10 RX ORDER — ALBUTEROL SULFATE 90 UG/1
2 INHALANT RESPIRATORY (INHALATION) EVERY 6 HOURS PRN
Qty: 8.5 G | Refills: 2 | Status: SHIPPED | OUTPATIENT
Start: 2024-09-10

## 2024-09-11 RX ORDER — BUDESONIDE AND FORMOTEROL FUMARATE DIHYDRATE 160; 4.5 UG/1; UG/1
1 AEROSOL RESPIRATORY (INHALATION) 2 TIMES DAILY
Qty: 10.2 G | Refills: 3 | Status: SHIPPED | OUTPATIENT
Start: 2024-09-11

## 2024-09-19 ENCOUNTER — APPOINTMENT (OUTPATIENT)
Dept: PHYSICAL MEDICINE AND REHAB | Facility: MEDICAL CENTER | Age: 84
End: 2024-09-19
Payer: MEDICARE

## 2024-10-21 ENCOUNTER — APPOINTMENT (OUTPATIENT)
Dept: PHYSICAL MEDICINE AND REHAB | Facility: MEDICAL CENTER | Age: 84
End: 2024-10-21
Payer: MEDICARE

## 2024-10-21 VITALS
WEIGHT: 119 LBS | SYSTOLIC BLOOD PRESSURE: 133 MMHG | BODY MASS INDEX: 20.32 KG/M2 | HEIGHT: 64 IN | OXYGEN SATURATION: 98 % | TEMPERATURE: 98.2 F | DIASTOLIC BLOOD PRESSURE: 73 MMHG | HEART RATE: 71 BPM

## 2024-10-21 DIAGNOSIS — M53.3 SACROILIAC JOINT DYSFUNCTION OF RIGHT SIDE: ICD-10-CM

## 2024-10-21 DIAGNOSIS — M54.16 ACUTE RIGHT LUMBAR RADICULOPATHY: ICD-10-CM

## 2024-10-21 DIAGNOSIS — M54.41 CHRONIC RIGHT-SIDED LOW BACK PAIN WITH RIGHT-SIDED SCIATICA: ICD-10-CM

## 2024-10-21 DIAGNOSIS — G89.29 CHRONIC RIGHT-SIDED LOW BACK PAIN WITH RIGHT-SIDED SCIATICA: ICD-10-CM

## 2024-10-21 DIAGNOSIS — M47.816 SPONDYLOSIS OF LUMBAR SPINE: ICD-10-CM

## 2024-10-21 PROCEDURE — 99204 OFFICE O/P NEW MOD 45 MIN: CPT | Performed by: STUDENT IN AN ORGANIZED HEALTH CARE EDUCATION/TRAINING PROGRAM

## 2024-10-21 PROCEDURE — 3078F DIAST BP <80 MM HG: CPT | Performed by: STUDENT IN AN ORGANIZED HEALTH CARE EDUCATION/TRAINING PROGRAM

## 2024-10-21 PROCEDURE — 3075F SYST BP GE 130 - 139MM HG: CPT | Performed by: STUDENT IN AN ORGANIZED HEALTH CARE EDUCATION/TRAINING PROGRAM

## 2024-10-21 ASSESSMENT — FIBROSIS 4 INDEX: FIB4 SCORE: 3.08

## 2024-10-21 ASSESSMENT — PAIN SCALES - GENERAL: PAINLEVEL: 9=SEVERE PAIN

## 2024-10-21 ASSESSMENT — PATIENT HEALTH QUESTIONNAIRE - PHQ9
CLINICAL INTERPRETATION OF PHQ2 SCORE: 2
5. POOR APPETITE OR OVEREATING: 0 - NOT AT ALL
SUM OF ALL RESPONSES TO PHQ QUESTIONS 1-9: 8

## 2024-10-21 NOTE — H&P (VIEW-ONLY)
[facility] (Physical Medicine and Rehabilitation)  Sports Medicine& Interventional Spine   New Patient Visit    Date of Service: 10/21/2024    Chief complaint:   Chief Complaint   Patient presents with    Establish Care     Back Pain        HISTORY    HPI: Mariama Gonzales 84 y.o. female who presents today with for initial evaluation of right sided low back pain with radiation to right lower extremity. She states that she has recently noted difficulty walking due to increased pain throughout the right lower extremity. She attributes the pain to both sciatic type pain as well as pain from her right sacroiliac joint. She had had previous injections for both types of pain which she states was effective and helped to reduce her pain. She did have a lumbar spine MRI performed in Dec of last year which showed diffuse multifocal degenerative changes throughout the lumbar spine with sever central canal stenosis at L4-5 with moderate bilateral neural foraminal stenosis and , mild bilateral neural foraminal stenosis at L5-S1. She states that aside from the radiating pain at the right lower extremity she has moderate to severe tenderness at the right SI joint as well. Her more pressing concern is the radiating pain. She does not some mild right sided weakness but attributes this more to pain than anything else.        Psychological testing for pain as depression and pain commonly coexist and need to both be treated.     Opioid Risk Score: 1      Interpretation of Opioid Risk Score   Score 0-3 = Low risk of abuse. Do UDS at least once per year.  Score 4-7 = Moderate risk of abuse. Do UDS 1-4 times per year.  Score 8+ = High risk of abuse. Refer to specialist.    PHQ      1/25/2024     9:40 AM 9/6/2024    10:20 AM 10/21/2024     2:20 PM   Depression Screen (PHQ-2/PHQ-9)   PHQ-2 Total Score 0 0 2   PHQ-9 Total Score   8       Interpretation of PHQ-9 Total Score   Score Severity   1-4 No Depression   5-9 Mild  Depression   10-14 Moderate Depression   15-19 Moderately Severe Depression   20-27 Severe Depression      Medical records review:  I reviewed the note from the referring provider Erinn Simmons, * dated 9/6/24.     Previous treatments:     Physical Therapy: Yes    Medications the patient is tried: NSAIDs and tylenol    Previous interventions: multiple previous epidurals and right SI injections    Previous surgeries to relieve the above pain:  none    ROS:   Red Flags ROS:   Fever, Chills, Sweats: Denies  Involuntary Weight Loss: Denies  Bladder Incontinence: Denies  Bowel Incontinence: denies  Saddle Anesthesia: Denies    All other systems reviewed and negative.       PMHx:   Past Medical History:   Diagnosis Date    Allergy, unspecified not elsewhere classified     Anxiety     Arthritis     Gtz's esophagus without dysplasia 6/29/2017    Blood transfusion, without reported diagnosis     CATARACT     Dental disorder     lichen planus    Depression     Fibromyalgia     GERD (gastroesophageal reflux disease)     GERD (gastroesophageal reflux disease)     Headache(784.0)     Headache, classical migraine     Kidney calculi     Lichen planus     Lupus     Migraine without aura, without mention of intractable migraine without mention of status migrainosus     Mitral valve prolapse     Osteoporosis, unspecified     Other convulsions     Isolated seizure March, 2014    Pain     joints, muscles    Psychiatric problem     depression    Shingles 10/4/11    dr velasco aware of this back of head    Sjogren's syndrome (HCC)     Snoring     Unspecified asthma(493.90)     Urinary tract infection, site not specified          Current Outpatient Medications on File Prior to Visit   Medication Sig Dispense Refill    budesonide-formoterol (SYMBICORT) 160-4.5 MCG/ACT Aerosol Inhale 1 Puff 2 times a day. 10.2 g 3    albuterol 108 (90 Base) MCG/ACT Aero Soln inhalation aerosol Inhale 2 Puffs every 6 hours as needed for Shortness  of Breath. 8.5 g 2    hyoscyamine (LEVSIN) 0.125 MG tablet Take 1 Tablet by mouth every 6 hours as needed for Cramping. 120 Tablet 1    triamterene/hctz (MAXZIDE-25/DYAZIDE) 37.5-25 MG Cap Take 1 Capsule by mouth 1 time a day as needed (swelling). 90 Capsule 2    Cholecalciferol (VITAMIN D) 50 MCG (2000 UT) Cap Take  by mouth.      fluvoxamine (LUVOX) 100 MG tablet       ALPRAZolam (XANAX) 0.25 MG Tab       azelastine (ASTELIN) 137 MCG/SPRAY nasal spray Administer 1 Spray into affected nostril(S) 2 times a day. 30 mL 2    diazePAM (VALIUM) 5 MG Tab Taking 2.5 at night PRN      Turmeric, Curcuma Longa, (CURCUMIN) Powder by Does not apply route.      Multiple Vitamin (MULTIVITAMIN PO) Take  by mouth every day.       No current facility-administered medications on file prior to visit.        PSHx:   Past Surgical History:   Procedure Laterality Date    HIP CANNULATED SCREW  1/28/2014    Performed by Cristian Delgado M.D. at SURGERY [facility] ORS    CLOSED REDUCTION  8/6/2012    Performed by CRISTIAN DELGADO at [facility] ORS    HIP CANNULATED SCREW  8/6/2012    Performed by CRISTIAN DELGADO at [facility] ORS    SHOULDER DECOMPRESSION ARTHROSCOPIC  10/6/2011    Performed by LARY CAR at [facility] ORS    CLAVICLE DISTAL EXCISION  10/6/2011    Performed by LARY CAR at [facility] ORS    OTHER  2007    reconstruction of shoulder to cover chest wall    MAMMOPLASTY AUGMENTATION  1988    removed, post op complications    OTHER  1988    17 reconstruction surgeries post mammoplasty    MASTECTOMY  1972    b/l    BREAST BIOPSY  1970's    x 5    ABDOMINAL EXPLORATION      EYE SURGERY      OPEN REDUCTION       Family history   Family History   Problem Relation Age of Onset    Arthritis Mother     Cancer Father     Heart Disease Father     Hypertension Father     Diabetes Maternal Aunt     Cancer Maternal Uncle     Heart Disease Maternal Grandmother     Diabetes Maternal  "Grandfather     Genetic Disorder Paternal Grandmother     Heart Disease Paternal Grandfather     Hyperlipidemia Paternal Grandfather     Diabetes Maternal Aunt     Genetic Disorder Maternal Uncle     Cancer Paternal Aunt     Cancer Paternal Aunt     Cancer Paternal Uncle      Medications: reviewed on epic.   Outpatient Medications Marked as Taking for the 10/21/24 encounter (Office Visit) with Rito Velasquez D.O.   Medication Sig Dispense Refill    budesonide-formoterol (SYMBICORT) 160-4.5 MCG/ACT Aerosol Inhale 1 Puff 2 times a day. 10.2 g 3    albuterol 108 (90 Base) MCG/ACT Aero Soln inhalation aerosol Inhale 2 Puffs every 6 hours as needed for Shortness of Breath. 8.5 g 2    hyoscyamine (LEVSIN) 0.125 MG tablet Take 1 Tablet by mouth every 6 hours as needed for Cramping. 120 Tablet 1    triamterene/hctz (MAXZIDE-25/DYAZIDE) 37.5-25 MG Cap Take 1 Capsule by mouth 1 time a day as needed (swelling). 90 Capsule 2    Cholecalciferol (VITAMIN D) 50 MCG (2000 UT) Cap Take  by mouth.      fluvoxamine (LUVOX) 100 MG tablet       ALPRAZolam (XANAX) 0.25 MG Tab       azelastine (ASTELIN) 137 MCG/SPRAY nasal spray Administer 1 Spray into affected nostril(S) 2 times a day. 30 mL 2    diazePAM (VALIUM) 5 MG Tab Taking 2.5 at night PRN      Turmeric, Curcuma Longa, (CURCUMIN) Powder by Does not apply route.      Multiple Vitamin (MULTIVITAMIN PO) Take  by mouth every day.        Allergies:   Allergies   Allergen Reactions    Cats Claw, Uncaria Tomentosa     Ciprofloxacin     Methotrexate      Other reaction(s): Blood infection    Other Drug      \"tumor necrosis factor drugs\" infections    Shellfish Allergy     Sulfa Drugs Hives     Other reaction(s): blood infection    Wheat      Social Hx:   Social History     Socioeconomic History    Marital status:      Spouse name: Not on file    Number of children: Not on file    Years of education: Not on file    Highest education level: Not on file   Occupational History    Not on " "file   Tobacco Use    Smoking status: Former     Current packs/day: 0.00     Types: Cigarettes     Start date: 1944     Quit date: 1978     Years since quittin.7    Smokeless tobacco: Former     Quit date: 1978   Vaping Use    Vaping status: Never Used   Substance and Sexual Activity    Alcohol use: Yes     Comment: Rarely    Drug use: No    Sexual activity: Never     Comment:    8 yrs ago   Other Topics Concern    Not on file   Social History Narrative    Not on file     Social Determinants of Health     Financial Resource Strain: Not on file   Food Insecurity: Not on file   Transportation Needs: Not on file   Physical Activity: Not on file   Stress: Stress Concern Present (5/10/2022)    [place] - Occupational Stress Questionnaire     Feeling of Stress : Rather much   Social Connections: Moderately Integrated (5/10/2022)    Social Connection and Isolation Panel [NHANES]     Frequency of Communication with Friends and Family: More than three times a week     Frequency of Social Gatherings with Friends and Family: More than three times a week     Attends Latter-day Services: More than 4 times per year     Active Member of Clubs or Organizations: Yes     Attends Club or Organization Meetings: 1 to 4 times per year     Marital Status:    Intimate Partner Violence: Not on file   Housing Stability: Low Risk  (5/10/2022)    Housing Stability Vital Sign     Unable to Pay for Housing in the Last Year: No     Number of Places Lived in the Last Year: 1     Unstable Housing in the Last Year: No         EXAMINATION     Physical Exam:   Vitals: /73 (BP Location: Right arm, Patient Position: Sitting, BP Cuff Size: Adult)   Pulse 71   Temp 36.8 °C (98.2 °F) (Temporal)   Ht 1.626 m (5' 4\")   Wt 54 kg (119 lb)   SpO2 98%     Constitutional:   Body Habitus: Body mass index is 20.43 kg/m².  Cooperation: Fully cooperates with exam  Appearance: " Well-groomed, well-nourished, not disheveled     Eyes: No scleral icterus to suggest severe liver disease, no proptosis to suggest severe hyperthyroid  ENT -no obvious auditory deficits, no obvious tongue lesions, tongue midline, no facial droop   Skin -no rashes or lesions noted   Respiratory-  breathing comfortable on room air, no audible wheezing  Cardiovascular- capillary refills less than 2 seconds. No lower extremity edema is noted.   Gastrointestinal - no obvious abdominal masses, No tenderness to palpation in the abdomen  Psychiatric- alert and oriented ×3. Normal affect.   Gait - mildly antalgic at the right    Musculoskeletal -     Thoracic/Lumbar Spine/Sacral Spine/Hips   Inspection: No evidence of atrophy in bilateral lower extremities throughout   ROM: decreased AROM with flexion, extension, lateral flexion, and rotation bilaterally, with pain   Palpation:   No tenderness to palpation in midline at T1-T12 levels. No tenderness to palpation in the left and right of the midline T1-L5  palpation over SI joint: positive right, negative left    palpation over buttock: negative bilaterally    palpation in hip or over the greater trochanters: negative bilaterally      Lumbar spine Special tests  Neuro tension  Straight leg test positive right, negative left    Slump test positive right, negative left      HIP  FAIR test negative bilaterally    Range of motion in the hips is within normal limits in flexion, extension, abduction, internal rotation, external rotation.    SI joint tests  Observation patient sits on one buttocks: Negative  SI joint compression positive right, negative left    SI joint distraction positive right, negative left    Thigh thrust test positive right, negative left    JOSEP test positive right, negative left     Neuro   Key points for the international standards for neurological classification of spinal cord injury (ISNCSCI) to light touch.   Dermatome R L   L2 2 2   L3 2 2   L4 2 2   L5  "2 2   S1 2 2   S2 2 2     Motor Exam Lower Extremities  ? Myotome R L   Hip flexion L2 5 5   Knee extension L3 5 5   Ankle dorsiflexion L4 5 5   Toe extension L5 5 5   Ankle plantarflexion S1 5 5       Babinski sign negative bilaterally   Clonus of the ankle negative bilaterally     Reflexes  ?  R L   Patella  2+ 2+   Achilles   2+ 2+     MEDICAL DECISION MAKING    Medical records review: see under HPI section.     DATA    Labs:   Lab Results   Component Value Date/Time    SODIUM 142 04/05/2024 10:00 AM    POTASSIUM 4.3 04/05/2024 10:00 AM    CHLORIDE 104 04/05/2024 10:00 AM    CO2 25 04/05/2024 10:00 AM    ANION 13.0 04/05/2024 10:00 AM    GLUCOSE 96 04/05/2024 10:00 AM    BUN 23 (H) 04/05/2024 10:00 AM    CREATININE 0.84 04/05/2024 10:00 AM    CALCIUM 9.7 04/05/2024 10:00 AM    ASTSGOT 27 04/05/2024 10:00 AM    ALTSGPT 12 04/05/2024 10:00 AM    TBILIRUBIN 0.6 04/05/2024 10:00 AM    ALBUMIN 4.2 04/05/2024 10:00 AM    ALBUMIN 4.59 08/21/2019 12:56 PM    TOTPROTEIN 6.9 04/05/2024 10:00 AM    TOTPROTEIN 7.0 08/21/2019 12:56 PM    GLOBULIN 2.7 04/05/2024 10:00 AM    AGRATIO 1.6 04/05/2024 10:00 AM   ]    Lab Results   Component Value Date/Time    PROTHROMBTM 12.7 09/13/2016 04:01 PM    INR 0.93 09/13/2016 04:01 PM        Lab Results   Component Value Date/Time    WBC 3.5 (L) 04/05/2024 10:00 AM    RBC 4.35 04/05/2024 10:00 AM    HEMOGLOBIN 13.8 04/05/2024 10:00 AM    HEMATOCRIT 42.8 04/05/2024 10:00 AM    MCV 98.4 (H) 04/05/2024 10:00 AM    MCH 31.7 04/05/2024 10:00 AM    MCHC 32.2 04/05/2024 10:00 AM    MPV 10.3 04/05/2024 10:00 AM    NEUTSPOLYS 44.10 04/09/2022 08:49 AM    LYMPHOCYTES 39.40 04/09/2022 08:49 AM    MONOCYTES 8.90 04/09/2022 08:49 AM    EOSINOPHILS 5.80 04/09/2022 08:49 AM    BASOPHILS 1.20 04/09/2022 08:49 AM        No results found for: \"HBA1C\"     Imaging:   I personally reviewed following images, these are my reads  MRI of the lumbar spine without contrast dated 12/30/2023 showed anterolisthesis of " L4 and L5, diffuse disc bulging at L2-3, L4 3-4, L4-5 and L5-S1.  Diffuse bilateral facet joint arthropathy at similar levels.  There is severe central canal stenosis at L4-L5 with moderate bilateral neural foraminal stenosis, and mild bilateral stenosis at L5-S1    Diagnosis   Visit Diagnoses     ICD-10-CM   1. Acute right lumbar radiculopathy  M54.16   2. Chronic right-sided low back pain with right-sided sciatica  M54.41    G89.29   3. Spondylosis of lumbar spine  M47.816   4. Sacroiliac joint dysfunction of right side  M53.3         ASSESSMENT AND PLAN:  Mariama Gonzales 84 y.o. female who presents today for acute on chronic right-sided low back pain.  Examination, history, and previous imaging are consistent with radicular type pain related to acute lumbosacral radiculopathy.  She also had findings on exam consistent with concern for right sided sacroiliac dysfunction.  Given that the radicular pain is more functionally limiting my recommendation is for patient to initially undergo a right L4-L5 L5-S1 transforaminal epidural steroid injection for symptomatic relief.  Will reconvene at follow-up with discussion for treatment and management of her sacral iliac pain.  She previously had good response to prior sacroiliac joint injections, we can consider this at follow-up.     Mariama was seen today for establish care.    Diagnoses and all orders for this visit:    Acute right lumbar radiculopathy  -     Referral to Physical Medicine Rehab    Chronic right-sided low back pain with right-sided sciatica  -     Referral to Physical Medicine Rehab    Spondylosis of lumbar spine    Sacroiliac joint dysfunction of right side          PLAN    home exercise program: I encouraged regular strengthening and stretching with a home exercise program     Interventional program: Recommend right sided L4-L5 L5-S1 transforaminal epidural steroid injections for symptomatic relief of radicular pain.        Follow-up: 2 weeks after  above procedures, at that time can discuss potential sacroiliac joint treatment      Please note that this dictation was created using voice recognition software. I have made every reasonable attempt to correct obvious errors but there may be errors of grammar and content that I may have overlooked prior to finalization of this note.    Rito Velasquez DO  Physical Medicine and Rehabilitation  Sports Medicine and Spine  [facility]

## 2024-11-08 ENCOUNTER — APPOINTMENT (OUTPATIENT)
Dept: RADIOLOGY | Facility: REHABILITATION | Age: 84
End: 2024-11-08
Attending: PHYSICAL MEDICINE & REHABILITATION
Payer: MEDICARE

## 2024-11-08 ENCOUNTER — HOSPITAL ENCOUNTER (OUTPATIENT)
Facility: REHABILITATION | Age: 84
End: 2024-11-08
Attending: PHYSICAL MEDICINE & REHABILITATION | Admitting: PHYSICAL MEDICINE & REHABILITATION
Payer: MEDICARE

## 2024-11-08 VITALS
DIASTOLIC BLOOD PRESSURE: 75 MMHG | WEIGHT: 117.28 LBS | SYSTOLIC BLOOD PRESSURE: 158 MMHG | HEIGHT: 64 IN | BODY MASS INDEX: 20.02 KG/M2 | OXYGEN SATURATION: 100 % | HEART RATE: 67 BPM | TEMPERATURE: 97.5 F | RESPIRATION RATE: 16 BRPM

## 2024-11-08 PROCEDURE — 64483 NJX AA&/STRD TFRM EPI L/S 1: CPT

## 2024-11-08 PROCEDURE — 64484 NJX AA&/STRD TFRM EPI L/S EA: CPT

## 2024-11-08 PROCEDURE — 700117 HCHG RX CONTRAST REV CODE 255

## 2024-11-08 PROCEDURE — 700111 HCHG RX REV CODE 636 W/ 250 OVERRIDE (IP): Mod: JZ

## 2024-11-08 RX ORDER — LIDOCAINE HYDROCHLORIDE 10 MG/ML
INJECTION, SOLUTION EPIDURAL; INFILTRATION; INTRACAUDAL; PERINEURAL
Status: COMPLETED
Start: 2024-11-08 | End: 2024-11-08

## 2024-11-08 RX ORDER — DEXAMETHASONE SODIUM PHOSPHATE 10 MG/ML
INJECTION, SOLUTION INTRAMUSCULAR; INTRAVENOUS
Status: COMPLETED
Start: 2024-11-08 | End: 2024-11-08

## 2024-11-08 RX ADMIN — IOHEXOL 10 ML: 240 INJECTION, SOLUTION INTRATHECAL; INTRAVASCULAR; INTRAVENOUS; ORAL at 11:10

## 2024-11-08 RX ADMIN — DEXAMETHASONE SODIUM PHOSPHATE 10 MG: 10 INJECTION, SOLUTION INTRAMUSCULAR; INTRAVENOUS at 11:09

## 2024-11-08 RX ADMIN — LIDOCAINE HYDROCHLORIDE 10 ML: 10 INJECTION, SOLUTION EPIDURAL; INFILTRATION; INTRACAUDAL; PERINEURAL at 11:10

## 2024-11-08 ASSESSMENT — FIBROSIS 4 INDEX: FIB4 SCORE: 3.08

## 2024-11-08 ASSESSMENT — PAIN DESCRIPTION - PAIN TYPE: TYPE: CHRONIC PAIN

## 2024-11-08 NOTE — OR SURGEON
Immediate Post OP Note    Pre-Op Diagnosis Codes:      * Lumbar radiculopathy [M54.16]      Post-Op Diagnosis Codes:     * Lumbar radiculopathy [M54.16]      Procedure(s):  RIGHT L4-5 and L5-S1 transforaminal epidural steroid injection with fluoroscopic guidance - Wound Class: Clean    Surgeon(s):  Jerzy Dia M.D.    Anesthesiologist/Type of Anesthesia:  No anesthesia staff entered./Local    Surgical Staff:  Circulator: Priyanka Landaverde R.N.  Scrub Person: Jannette Cee  Radiology Technologist: Kitty Bergman    Specimens removed if any:  * No specimens in log *    Estimated Blood Loss: None      Findings: None      Complications: None        11/8/2024 11:27 AM Jerzy Dia M.D.

## 2024-11-08 NOTE — INTERVAL H&P NOTE
Consented Procedure: RIGHT L4-5 and L5-S1 transforaminal epidural steroid injection with fluoroscopic guidance  I have examined the patient, provided the risks, benefits, and alternatives to the procedure(s) indicated on the signed consent form, and the patient wishes to proceed.    H&P reviewed. The patient was examined and there are no changes to the H&P      Jerzy Dia M.D.  11/08/24 11:01 AM

## 2024-11-08 NOTE — PROGRESS NOTES
0948 Pt arrived to pre-procedure area. Procedure & plan for recovery reviewed, site confirmed, & consent signed. Allergies & current medications verified. Appointed  to be called for DC. Printed discharge instructions discussed & signed. Pt denies taking NSAIDS or anticoagulants in the last 5 days. MD to bedside prior to procedure.     1028 Pt to recovery area s/p TESI & updates received from procedure RN. Pt reports relief in pain at this time. Ice pack given for home care. Dr. Dia to bedside for post-procedure evaluation.      1140 Pt ambulates without difficulty & meets DC criteria. RN assisted pt off unit to appointed .

## 2024-11-08 NOTE — OP REPORT
Date of Service: 11/8/2024     Patient: Mariama Gonzales 84 y.o. female     MRN: [id number]     Physician/s: Jerzy Dia MD    Pre-operative Diagnosis: Lumbar radiculopathy    Post-operative Diagnosis: Lumbar radiculopathy    Procedure: right Lumbar Transforaminal Epidural Steroid  at the L4-5 and L5-S1 levels.     Description of procedure:    The risks, benefits, and alternatives of the procedure were reviewed and discussed with the patient.  Written informed consent was freely obtained. A pre-procedural time-out was conducted by the physician verifying patient’s identity, procedure to be performed, procedure site and side, and allergy verification. Appropriate equipment was determined to be in place for the procedure.         The patient's vital signs were carefully monitored before, throughout, and after the procedure.     In the fluoroscopy suite the patient was placed in a prone position, a pillow placed underneath their umbilicus. The skin was prepped and draped in the usual sterile fashion.     The fluoroscope was placed over the lumbar spine and adjusted into the proper AP/Oblique view to enter the transforaminal space just adjacent to the pedicle at the levels below. The targets for injection were then marked at the right L4-5. A 27g 1.5 inch needle was placed into the marked site, and 1mL of 1% Lidocaine was injected subcutaneously into the epidermal and dermal layers. The needle was removed intact.  A 25g 3.5 inch spinal needle was then placed and advanced under fluoroscopic guidance in an oblique view towards the epidural space of the levels noted above. The needle position was confirmed to not be past the 6 o'clock position in the AP view and it was in the neuroforamen in the lateral view.        The fluoroscope was was then adjusted over the lumbar spine and adjusted into the proper AP/Oblique view to enter the transforaminal space adjacent to the pedicle at the RIGHT  L5-S1. A 27g 1.5 inch  needle was placed into the marked site, and 1mL of 1% Lidocaine was injected subcutaneously into the epidermal and dermal layers. The needle was removed intact.  A 25g 3.5 inch spinal needle was then placed and advanced under fluoroscopic guidance in an oblique view towards the epidural space of the levels noted above. The needle position was confirmed to not be past the 6 o'clock position in the AP view and it was in the neuroforamen in the lateral view.       Under live fluoroscopic guidance in the AP view, contrast dye was used to highlight the epidural space spread of each level above. Final fluoroscopic images were saved.  Following negative aspiration, 1mL of 1% lidocaine preservative free with 5mg dexamethasone   was then injected at each level above, and the needles were removed intact after restyleted. The patient's back was covered with a 4x4 gauze, the area was cleansed with sterile normal saline, and a dressing was applied. There were no complications noted.     The patient was then evaluated post-procedure, and was hemodynamically stable prior to leaving the post-operative care unit.     The patient had 60% pain relief postprocedure  Preprocedural pain: 8/10 NRS  Postprocedural pain: 3/10 NRS    Follow-up as scheduled    Jerzy Dia MD  Interventional Spine and Pain  Physical Medicine and Rehabilitation  [facility]          CPT codes  Transforaminal epidural injection- lumbar or sacral (first level):  12974  Transforaminal epidural injection- lumbar or sacral (each additional level):  94040

## 2024-11-12 ENCOUNTER — TELEPHONE (OUTPATIENT)
Dept: PHYSICAL MEDICINE AND REHAB | Facility: MEDICAL CENTER | Age: 84
End: 2024-11-12
Payer: MEDICARE

## 2024-11-12 NOTE — TELEPHONE ENCOUNTER
Called for Post -SP check up: RIGHT L4-5 and L5-S1 transforaminal epidural steroid injection with fluoroscopic guidance     Change in pain: Yes ( legs are doing fine but the back still experiencing pain.    Concerns? No    Confirmed [facility] appt? Yes

## 2024-11-21 ENCOUNTER — APPOINTMENT (OUTPATIENT)
Dept: PHYSICAL MEDICINE AND REHAB | Facility: MEDICAL CENTER | Age: 84
End: 2024-11-21
Payer: MEDICARE

## 2024-12-13 ENCOUNTER — OFFICE VISIT (OUTPATIENT)
Dept: MEDICAL GROUP | Facility: MEDICAL CENTER | Age: 84
End: 2024-12-13
Payer: MEDICARE

## 2024-12-13 VITALS
WEIGHT: 119.82 LBS | DIASTOLIC BLOOD PRESSURE: 76 MMHG | OXYGEN SATURATION: 98 % | HEART RATE: 71 BPM | TEMPERATURE: 98.1 F | SYSTOLIC BLOOD PRESSURE: 122 MMHG | HEIGHT: 64 IN | BODY MASS INDEX: 20.46 KG/M2

## 2024-12-13 DIAGNOSIS — M25.50 POLYARTHRALGIA: Chronic | ICD-10-CM

## 2024-12-13 DIAGNOSIS — N30.00 ACUTE CYSTITIS WITHOUT HEMATURIA: ICD-10-CM

## 2024-12-13 DIAGNOSIS — M48.062 SPINAL STENOSIS OF LUMBAR REGION WITH NEUROGENIC CLAUDICATION: ICD-10-CM

## 2024-12-13 DIAGNOSIS — M35.00 SJOGREN'S SYNDROME, WITH UNSPECIFIED ORGAN INVOLVEMENT (HCC): ICD-10-CM

## 2024-12-13 DIAGNOSIS — R30.0 DYSURIA: ICD-10-CM

## 2024-12-13 DIAGNOSIS — M32.8 OTHER FORMS OF SYSTEMIC LUPUS ERYTHEMATOSUS, UNSPECIFIED ORGAN INVOLVEMENT STATUS (HCC): Chronic | ICD-10-CM

## 2024-12-13 DIAGNOSIS — J45.30 MILD PERSISTENT REACTIVE AIRWAY DISEASE WITHOUT COMPLICATION: Chronic | ICD-10-CM

## 2024-12-13 LAB
APPEARANCE UR: CLEAR
BILIRUB UR STRIP-MCNC: NEGATIVE MG/DL
COLOR UR AUTO: YELLOW
GLUCOSE UR STRIP.AUTO-MCNC: NEGATIVE MG/DL
KETONES UR STRIP.AUTO-MCNC: NEGATIVE MG/DL
LEUKOCYTE ESTERASE UR QL STRIP.AUTO: NORMAL
NITRITE UR QL STRIP.AUTO: NEGATIVE
PH UR STRIP.AUTO: 7 [PH] (ref 5–8)
PROT UR QL STRIP: NEGATIVE MG/DL
RBC UR QL AUTO: NEGATIVE
SP GR UR STRIP.AUTO: 1.01
UROBILINOGEN UR STRIP-MCNC: NORMAL MG/DL

## 2024-12-13 PROCEDURE — 99214 OFFICE O/P EST MOD 30 MIN: CPT | Performed by: BEHAVIOR ANALYST

## 2024-12-13 PROCEDURE — 3074F SYST BP LT 130 MM HG: CPT | Performed by: BEHAVIOR ANALYST

## 2024-12-13 PROCEDURE — 3078F DIAST BP <80 MM HG: CPT | Performed by: BEHAVIOR ANALYST

## 2024-12-13 PROCEDURE — 81002 URINALYSIS NONAUTO W/O SCOPE: CPT | Performed by: BEHAVIOR ANALYST

## 2024-12-13 RX ORDER — CEFDINIR 300 MG/1
300 CAPSULE ORAL 2 TIMES DAILY
Qty: 10 CAPSULE | Refills: 0 | Status: SHIPPED | OUTPATIENT
Start: 2024-12-13 | End: 2024-12-18

## 2024-12-13 ASSESSMENT — FIBROSIS 4 INDEX: FIB4 SCORE: 3.08

## 2024-12-13 NOTE — PROGRESS NOTES
Subjective:     CC:    Chief Complaint   Patient presents with    Follow-Up    Other     Possible UTI          HISTORY OF THE PRESENT ILLNESS:   Mariama presents today with    Urinary urgency  Onset of dysuria about 2 weeks ago. She admits to drinking less water due to colder weather but tends to have more UTI in the winter time. Urine color is darker. No odor. Increased urgency and frequency. Started taking azo at onset of symptoms.  Denies fever chills, pelvic pain or bladder pain, or low back tenderness    -She would like to be referred friend to a new rheumatologist for her autoimmune disorders    Problem   Lumbar Stenosis    MRI 12/2023 showing severe stenosis. Previously had epidural injections that improved back pain. Currently pain down right leg with weakness after activity.   She had epidural with Dr. Dia and Dr. Velasquez 11/2024     Reactive Airway Disease Without Complication    She takes albuterol rescue inhaler as needed but has been taking this on most days recently.  She is not sure if her cough or shortness of breath is actually due to her lungs, postnasal drip or sometimes acid reflux but feels taking albuterol helps.  She previously was prescribed Symbicort but did not feel this helped as well as the as needed albuterol.  She saw an allergist as referred. She is currently taking pepcid, zyrtec, and astelin spray. She got her allergy tests back is highly allergic to cats and some foods including wheat and oats.            Problem   Lumbar Stenosis    MRI 12/2023 showing severe stenosis. Previously had epidural injections that improved back pain. Currently pain down right leg with weakness after activity.   She had epidural with Dr. Dia and Dr. Velasquez 11/2024     Reactive Airway Disease Without Complication    She takes albuterol rescue inhaler as needed but has been taking this on most days recently.  She is not sure if her cough or shortness of breath is actually due to her lungs, postnasal drip  "or sometimes acid reflux but feels taking albuterol helps.  She previously was prescribed Symbicort but did not feel this helped as well as the as needed albuterol.  She saw an allergist as referred. She is currently taking pepcid, zyrtec, and astelin spray. She got her allergy tests back is highly allergic to cats and some foods including wheat and oats.          Current Outpatient Medications   Medication Sig    cefdinir (OMNICEF) 300 MG Cap Take 1 Capsule by mouth 2 times a day for 5 days.    budesonide-formoterol (SYMBICORT) 160-4.5 MCG/ACT Aerosol Inhale 1 Puff 2 times a day.    albuterol 108 (90 Base) MCG/ACT Aero Soln inhalation aerosol Inhale 2 Puffs every 6 hours as needed for Shortness of Breath.    hyoscyamine (LEVSIN) 0.125 MG tablet Take 1 Tablet by mouth every 6 hours as needed for Cramping.    triamterene/hctz (MAXZIDE-25/DYAZIDE) 37.5-25 MG Cap Take 1 Capsule by mouth 1 time a day as needed (swelling).    Cholecalciferol (VITAMIN D) 50 MCG (2000 UT) Cap Take  by mouth.    fluvoxamine (LUVOX) 100 MG tablet     ALPRAZolam (XANAX) 0.25 MG Tab     azelastine (ASTELIN) 137 MCG/SPRAY nasal spray Administer 1 Spray into affected nostril(S) 2 times a day.    diazePAM (VALIUM) 5 MG Tab Taking 2.5 at night PRN    Turmeric, Curcuma Longa, (CURCUMIN) Powder by Does not apply route.    Multiple Vitamin (MULTIVITAMIN PO) Take  by mouth every day.          ROS: See HPI        Objective:     Exam: /76 (BP Location: Left arm, Patient Position: Sitting, BP Cuff Size: Adult)   Pulse 71   Temp 36.7 °C (98.1 °F) (Tympanic)   Ht 1.626 m (5' 4\")   Wt 54.3 kg (119 lb 13.1 oz)   SpO2 98%   BMI 20.57 kg/m²   Body mass index is 20.57 kg/m².    Physical Exam  Constitutional:       Appearance: Normal appearance.   HENT:      Head: Normocephalic and atraumatic.   Cardiovascular:      Pulses: Normal pulses.   Pulmonary:      Effort: Pulmonary effort is normal.   Musculoskeletal:      Cervical back: Normal range of " motion.   Neurological:      General: No focal deficit present.      Mental Status: She is alert.            Component      Latest Ref Rng 12/13/2024   POC Color      Negative  yellow    POC Appearance      Negative  Clear    POC Glucose      Negative mg/dL Negative    POC Bilirubin      Negative mg/dL Negative    POC Ketones      Negative mg/dL Negative    POC Specific Gravity      <1.005 - >1.030  1.010    POC Blood      Negative  Negative    POC Urine PH      5.0 - 8.0  7.0    POC Protein      Negative mg/dL Negative    POC Urobiligen      Negative (0.2) mg/dL 0.2 E.U./dL    POC Nitrites      Negative  Negative    POC Leukocyte Esterase      Negative  Small          Assessment & Plan:     84 y.o. female with the following -     1. Dysuria  - POCT Urinalysis    2. Acute cystitis without hematuria  -Positive urine dipstick in office.  Start antibiotics.  She is allergic to sulfa drugs.  Send urine for culture.   -We had thorough discussion regarding preventing UTI including staying adequately hydrated, d-mannose supplementation  - cefdinir (OMNICEF) 300 MG Cap; Take 1 Capsule by mouth 2 times a day for 5 days.  Dispense: 10 Capsule; Refill: 0  - URINE CULTURE(NEW); Future    3. Spinal stenosis of lumbar region with neurogenic claudication  -Just recently had epidural injection with [facility] and doing well.    4. Other forms of systemic lupus erythematosus, unspecified organ involvement status (HCC)  -Patient  requests referral to specific rheumatologist.  - Referral to Rheumatology    5. Sjogren's syndrome, with unspecified organ involvement (HCC)  - Referral to Rheumatology    6. Polyarthralgia  - Referral to Rheumatology    7. Mild persistent reactive airway disease without complication  -Chronic, persistent.  Patient using albuterol almost daily.  Had a thorough discussion patient regarding proper use of rescue inhaler.  Offered to send daily maintenance inhaler prescription.  Patient declines, and says  the albuterol is working well for her without side effects.  If her symptoms were to increase she would be agreeable to starting a daily inhaler.        Return in about 6 months (around 6/13/2025) for Medicare AWV.    Please note that this dictation was created using voice recognition software. I have made every reasonable attempt to correct obvious errors, but I expect that there are errors of grammar and possibly content that I did not discover before finalizing the note.

## 2024-12-17 ENCOUNTER — TELEPHONE (OUTPATIENT)
Dept: MEDICAL GROUP | Facility: MEDICAL CENTER | Age: 84
End: 2024-12-17
Payer: MEDICARE

## 2024-12-18 NOTE — TELEPHONE ENCOUNTER
Left message for patient, culture was receive by the lab yesterday, and could not be done. Spoke with Mason, and wanted to know how patient was doing, if symptoms don't resolve with antibiotics, we can culture urine.

## 2024-12-27 ENCOUNTER — HOSPITAL ENCOUNTER (OUTPATIENT)
Facility: MEDICAL CENTER | Age: 84
End: 2024-12-27
Attending: STUDENT IN AN ORGANIZED HEALTH CARE EDUCATION/TRAINING PROGRAM
Payer: MEDICARE

## 2024-12-27 DIAGNOSIS — N30.00 ACUTE CYSTITIS WITHOUT HEMATURIA: ICD-10-CM

## 2024-12-27 DIAGNOSIS — R30.0 DYSURIA: ICD-10-CM

## 2024-12-27 LAB — AMBIGUOUS DTTM AMBI4: NORMAL

## 2024-12-27 PROCEDURE — 87086 URINE CULTURE/COLONY COUNT: CPT

## 2024-12-28 LAB
BACTERIA UR CULT: NORMAL
SIGNIFICANT IND 70042: NORMAL
SITE SITE: NORMAL
SOURCE SOURCE: NORMAL

## 2025-03-10 ENCOUNTER — APPOINTMENT (OUTPATIENT)
Dept: URBAN - METROPOLITAN AREA CLINIC 6 | Facility: CLINIC | Age: 85
Setting detail: DERMATOLOGY
End: 2025-03-10

## 2025-03-10 DIAGNOSIS — D485 NEOPLASM OF UNCERTAIN BEHAVIOR OF SKIN: ICD-10-CM

## 2025-03-10 PROBLEM — D48.5 NEOPLASM OF UNCERTAIN BEHAVIOR OF SKIN: Status: ACTIVE | Noted: 2025-03-10

## 2025-03-10 PROCEDURE — ? COUNSELING

## 2025-03-10 PROCEDURE — ? BIOPSY BY SHAVE METHOD

## 2025-03-10 PROCEDURE — 11102 TANGNTL BX SKIN SINGLE LES: CPT

## 2025-03-10 ASSESSMENT — LOCATION SIMPLE DESCRIPTION DERM: LOCATION SIMPLE: RIGHT CHEEK

## 2025-03-10 ASSESSMENT — LOCATION ZONE DERM: LOCATION ZONE: FACE

## 2025-03-10 ASSESSMENT — LOCATION DETAILED DESCRIPTION DERM: LOCATION DETAILED: RIGHT CENTRAL MALAR CHEEK

## 2025-03-10 NOTE — PROCEDURE: BIOPSY BY SHAVE METHOD

## 2025-04-21 ENCOUNTER — APPOINTMENT (OUTPATIENT)
Dept: URBAN - METROPOLITAN AREA CLINIC 36 | Facility: CLINIC | Age: 85
Setting detail: DERMATOLOGY
End: 2025-04-21

## 2025-04-21 PROBLEM — C44.329 SQUAMOUS CELL CARCINOMA OF SKIN OF OTHER PARTS OF FACE: Status: ACTIVE | Noted: 2025-04-21

## 2025-04-21 PROCEDURE — ? MOHS SURGERY

## 2025-04-21 PROCEDURE — 17311 MOHS 1 STAGE H/N/HF/G: CPT

## 2025-04-21 PROCEDURE — 17312 MOHS ADDL STAGE: CPT

## 2025-04-21 PROCEDURE — 13132 CMPLX RPR F/C/C/M/N/AX/G/H/F: CPT

## 2025-04-21 NOTE — PROCEDURE: MOHS SURGERY
Special Stains Stage 9 - Results: Base On Clearance Noted Above
Staged Advancement Flap Text: The defect edges were debeveled with a #15 scalpel blade.  Given the location of the defect, shape of the defect and the proximity to free margins a staged advancement flap was deemed most appropriate.  Using a sterile surgical marker, an appropriate advancement flap was drawn incorporating the defect and placing the expected incisions within the relaxed skin tension lines where possible. The area thus outlined was incised deep to adipose tissue with a #15 scalpel blade.  The skin margins were undermined to an appropriate distance in all directions utilizing iris scissors.
Estlander Flap (Lower To Upper Lip) Text: The defect of the lower lip was assessed and measured.  Given the location and size of the defect, an Estlander flap was deemed most appropriate.  Using a sterile surgical marker, an appropriate Estlander flap was measured and drawn on the upper lip. Local anesthesia was then infiltrated. A scalpel was then used to incise the lateral aspect of the flap, through skin, muscle and mucosa, leaving the flap pedicled medially.  The flap was then rotated and positioned to fill the lower lip defect.  The flap was then sutured into place with a three layer technique, closing the orbicularis oris muscle layer with subcutaneous buried sutures, followed by a mucosal layer and an epidermal layer.
Validate That Repair Assistants Are Chosen (Can Hide Repair Assistants In The Settings Tab): No
Complex Requirements: Retention Sutures?: Yes
Bilobed Transposition Flap Text: The defect edges were debeveled with a #15 scalpel blade.  Given the location of the defect and the proximity to free margins a bilobed transposition flap was deemed most appropriate.  Using a sterile surgical marker, an appropriate bilobe flap drawn around the defect.    The area thus outlined was incised deep to adipose tissue with a #15 scalpel blade.  The skin margins were undermined to an appropriate distance in all directions utilizing iris scissors.
Post-Care Instructions: I reviewed with the patient in detail post-care instructions. Patient is not to engage in any heavy lifting, exercise, or swimming for the next 14 days. Should the patient develop any fevers, chills, bleeding, severe pain patient will contact the office immediately.
Abbe Flap (Upper To Lower Lip) Text: The defect of the lower lip was assessed and measured.  Given the location and size of the defect, an Abbe flap was deemed most appropriate.  Using a sterile surgical marker, an appropriate Abbe flap was measured and drawn on the upper lip. Local anesthesia was then infiltrated.  A scalpel was then used to incise the upper lip through and through the skin, vermilion, muscle and mucosa, leaving the flap pedicled on the opposite side.  The flap was then rotated and transferred to the lower lip defect.  The flap was then sutured into place with a three layer technique, closing the orbicularis oris muscle layer with subcutaneous buried sutures, followed by a mucosal layer and an epidermal layer.
Referred To Asc For Closure Text (Leave Blank If You Do Not Want): After obtaining clear surgical margins the patient was sent to an ASC for surgical repair.  The patient understands they will receive post-surgical care and follow-up from the ASC physician.
Stage 9: Number Of Blocks?: 0
Consent 3/Introductory Paragraph: I gave the patient a chance to ask questions they had about the procedure.  Following this I explained the Mohs procedure and consent was obtained. The risks, benefits and alternatives to therapy were discussed in detail. Specifically, the risks of infection, scarring, bleeding, prolonged wound healing, incomplete removal, allergy to anesthesia, nerve injury and recurrence were addressed. Prior to the procedure, the treatment site was clearly identified and confirmed by the patient. All components of Universal Protocol/PAUSE Rule completed.
Secondary Intention Text (Leave Blank If You Do Not Want): The defect will heal with secondary intention.
No Repair - Repaired With Adjacent Surgical Defect Text (Leave Blank If You Do Not Want): After obtaining clear surgical margins the defect was repaired concurrently with another surgical defect which was in close approximation.
Mercedes Flap Text: The defect edges were debeveled with a #15 scalpel blade.  Given the location of the defect, shape of the defect and the proximity to free margins a Mercedes flap was deemed most appropriate.  Using a sterile surgical marker, an appropriate advancement flap was drawn incorporating the defect and placing the expected incisions within the relaxed skin tension lines where possible. The area thus outlined was incised deep to adipose tissue with a #15 scalpel blade.  The skin margins were undermined to an appropriate distance in all directions utilizing iris scissors.
Wound Care (No Sutures): Petrolatum
Mid-Level Procedure Text (D): After obtaining clear surgical margins the patient was sent to a mid-level provider for surgical repair.  The patient understands they will receive post-surgical care and follow-up from the mid-level provider.
Full Thickness Lip Wedge Repair (Flap) Text: Given the location of the defect and the proximity to free margins a full thickness wedge repair was deemed most appropriate.  Using a sterile surgical marker, the appropriate repair was drawn incorporating the defect and placing the expected incisions perpendicular to the vermilion border.  The vermilion border was also meticulously outlined to ensure appropriate reapproximation during the repair.  The area thus outlined was incised through and through with a #15 scalpel blade.  The muscularis and dermis were reaproximated with deep sutures following hemostasis. Care was taken to realign the vermilion border before proceeding with the superficial closure.  Once the vermilion was realigned the superfical and mucosal closure was finished.
Cheiloplasty (Complex) Text: A decision was made to reconstruct the defect with a  cheiloplasty.  The defect was undermined extensively.  Additional obicularis oris muscle was excised with a 15 blade scalpel.  The defect was converted into a full thickness wedge to facilite a better cosmetic result.  Small vessels were then tied off with 5-0 monocyrl. The obicularis oris, superficial fascia, adipose and dermis were then reapproximated.  After the deeper layers were approximated the epidermis was reapproximated with particular care given to realign the vermilion border.
Stage 13: Additional Anesthesia Type: 1% lidocaine with epinephrine
Bi-Rhombic Flap Text: The defect edges were debeveled with a #15 scalpel blade.  Given the location of the defect and the proximity to free margins a bi-rhombic flap was deemed most appropriate.  Using a sterile surgical marker, an appropriate rhombic flap was drawn incorporating the defect. The area thus outlined was incised deep to adipose tissue with a #15 scalpel blade.  The skin margins were undermined to an appropriate distance in all directions utilizing iris scissors.
Referred To Oculoplastics For Closure Text (Leave Blank If You Do Not Want): After obtaining clear surgical margins the patient was sent to oculoplastics for surgical repair.  The patient understands they will receive post-surgical care and follow-up from the referring physician's office.
Spiral Flap Text: The defect edges were debeveled with a #15 scalpel blade.  Given the location of the defect, shape of the defect and the proximity to free margins a spiral flap was deemed most appropriate.  Using a sterile surgical marker, an appropriate rotation flap was drawn incorporating the defect and placing the expected incisions within the relaxed skin tension lines where possible. The area thus outlined was incised deep to adipose tissue with a #15 scalpel blade.  The skin margins were undermined to an appropriate distance in all directions utilizing iris scissors.
Otolaryngologist Procedure Text (B): After obtaining clear surgical margins the patient was sent to otolaryngology for surgical repair.  The patient understands they will receive post-surgical care and follow-up from the referring physician's office.
Eyelid Full Thickness Repair - 60669: The eyelid defect was full thickness which required a wedge repair of the eyelid. Special care was taken to ensure that the eyelid margin was realligned when placing sutures.
Transposition Flap Text: The defect edges were debeveled with a #15 scalpel blade.  Given the location of the defect and the proximity to free margins a transposition flap was deemed most appropriate.  Using a sterile surgical marker, an appropriate transposition flap was drawn incorporating the defect.    The area thus outlined was incised deep to adipose tissue with a #15 scalpel blade.  The skin margins were undermined to an appropriate distance in all directions utilizing iris scissors.
Repair Anesthesia Method: local infiltration
Brow Lift Text: A midfrontal incision was made medially to the defect to allow access to the tissues just superior to the left eyebrow. Following careful dissection inferiorly in a supraperiosteal plane to the level of the left eyebrow, several 3-0 monocryl sutures were used to resuspend the eyebrow orbicularis oculi muscular unit to the superior frontal bone periosteum. This resulted in an appropriate reapproximation of static eyebrow symmetry and correction of the left brow ptosis.
Nasalis Myocutaneous Flap Text: Using a #15 blade, an incision was made around the donor flap to the level of the nasalis muscle. Wide lateral undermining was then performed in both the subcutaneous plane above the nasalis muscle, and in a submuscular plane just above periosteum. This allowed the formation of a free nasalis muscle axial pedicle which was still attached to the actual cutaneous flap, increasing its mobility and vascular viability. Hemostasis was obtained with pinpoint electrocoagulation. The flap was mobilized into position and the pivotal anchor points positioned and stabilized with buried interrupted sutures. Subcutaneous and dermal tissues were closed in a multilayered fashion with sutures. Tissue redundancies were excised, and the epidermal edges were apposed without significant tension and sutured with sutures.
Melolabial Interpolation Flap Text: A decision was made to reconstruct the defect utilizing an interpolation axial flap and a staged reconstruction.  A telfa template was made of the defect.  This telfa template was then used to outline the melolabial interpolation flap.  The donor area for the pedicle flap was then injected with anesthesia.  The flap was excised through the skin and subcutaneous tissue down to the layer of the underlying musculature.  The pedicle flap was carefully excised within this deep plane to maintain its blood supply.  The edges of the donor site were undermined.   The donor site was closed in a primary fashion.  The pedicle was then rotated into position and sutured.  Once the tube was sutured into place, adequate blood supply was confirmed with blanching and refill.  The pedicle was then wrapped with xeroform gauze and dressed appropriately with a telfa and gauze bandage to ensure continued blood supply and protect the attached pedicle.
O-Z Flap Text: The defect edges were debeveled with a #15 scalpel blade.  Given the location of the defect, shape of the defect and the proximity to free margins an O-Z flap was deemed most appropriate.  Using a sterile surgical marker, an appropriate transposition flap was drawn incorporating the defect and placing the expected incisions within the relaxed skin tension lines where possible. The area thus outlined was incised deep to adipose tissue with a #15 scalpel blade.  The skin margins were undermined to an appropriate distance in all directions utilizing iris scissors.
Surgical Defect Length In Cm (Optional): 1.0
Retention Suture Text: Retention sutures were placed to support the closure and prevent dehiscence.
Island Pedicle Flap-Requiring Vessel Identification Text: The defect edges were debeveled with a #15 scalpel blade.  Given the location of the defect, shape of the defect and the proximity to free margins an island pedicle advancement flap was deemed most appropriate.  Using a sterile surgical marker, an appropriate advancement flap was drawn, based on the axial vessel mentioned above, incorporating the defect, outlining the appropriate donor tissue and placing the expected incisions within the relaxed skin tension lines where possible.    The area thus outlined was incised deep to adipose tissue with a #15 scalpel blade.  The skin margins were undermined to an appropriate distance in all directions around the primary defect and laterally outward around the island pedicle utilizing iris scissors.  There was minimal undermining beneath the pedicle flap.
Interpolation Flap Text: A decision was made to reconstruct the defect utilizing an interpolation axial flap and a staged reconstruction.  A telfa template was made of the defect.  This telfa template was then used to outline the interpolation flap.  The donor area for the pedicle flap was then injected with anesthesia.  The flap was excised through the skin and subcutaneous tissue down to the layer of the underlying musculature.  The interpolation flap was carefully excised within this deep plane to maintain its blood supply.  The edges of the donor site were undermined.   The donor site was closed in a primary fashion.  The pedicle was then rotated into position and sutured.  Once the tube was sutured into place, adequate blood supply was confirmed with blanching and refill.  The pedicle was then wrapped with xeroform gauze and dressed appropriately with a telfa and gauze bandage to ensure continued blood supply and protect the attached pedicle.
Alar Island Pedicle Flap Text: The defect edges were debeveled with a #15 scalpel blade.  Given the location of the defect, shape of the defect and the proximity to the alar rim an island pedicle advancement flap was deemed most appropriate.  Using a sterile surgical marker, an appropriate advancement flap was drawn incorporating the defect, outlining the appropriate donor tissue and placing the expected incisions within the nasal ala running parallel to the alar rim. The area thus outlined was incised with a #15 scalpel blade.  The skin margins were undermined minimally to an appropriate distance in all directions around the primary defect and laterally outward around the island pedicle utilizing iris scissors.  There was minimal undermining beneath the pedicle flap.
Plastic Surgeon Procedure Text (F): After obtaining clear surgical margins the patient was sent to plastics for surgical repair.  The patient understands they will receive post-surgical care and follow-up from the referring physician's office.
Nostril Rim Text: The closure involved the nostril rim.
Star Wedge Flap Text: The defect edges were debeveled with a #15 scalpel blade.  Given the location of the defect, shape of the defect and the proximity to free margins a star wedge flap was deemed most appropriate.  Using a sterile surgical marker, an appropriate rotation flap was drawn incorporating the defect and placing the expected incisions within the relaxed skin tension lines where possible. The area thus outlined was incised deep to adipose tissue with a #15 scalpel blade.  The skin margins were undermined to an appropriate distance in all directions utilizing iris scissors.
Mohs Method Verbiage: An incision at a 45 degree angle following the standard Mohs approach was done and the specimen was harvested as a microscopic controlled layer.
Intermediate Repair Preamble Text (Leave Blank If You Do Not Want): Undermining was performed with blunt dissection.
Epidermal Closure Graft Donor Site (Optional): running
Area H Indication Text: Tumors in this location are included in Area H (eyelids, eyebrows, nose, lips, chin, ear, pre-auricular, post-auricular, temple, genitalia, hands, feet, ankles and areola).  Tissue conservation is critical in these anatomic locations.
Double O-Z Plasty Text: The defect edges were debeveled with a #15 scalpel blade.  Given the location of the defect, shape of the defect and the proximity to free margins a Double O-Z plasty (double transposition flap) was deemed most appropriate.  Using a sterile surgical marker, the appropriate transposition flaps were drawn incorporating the defect and placing the expected incisions within the relaxed skin tension lines where possible. The area thus outlined was incised deep to adipose tissue with a #15 scalpel blade.  The skin margins were undermined to an appropriate distance in all directions utilizing iris scissors.  Hemostasis was achieved with electrocautery.  The flaps were then transposed into place, one clockwise and the other counterclockwise, and anchored with interrupted buried subcutaneous sutures.
Consent (Lip)/Introductory Paragraph: The rationale for Mohs was explained to the patient and consent was obtained. The risks, benefits and alternatives to therapy were discussed in detail. Specifically, the risks of lip deformity, changes in the oral aperture, infection, scarring, bleeding, prolonged wound healing, incomplete removal, allergy to anesthesia, nerve injury and recurrence were addressed. Prior to the procedure, the treatment site was clearly identified and confirmed by the patient. All components of Universal Protocol/PAUSE Rule completed.
Area M Indication Text: Tumors in this location are included in Area M (cheek, forehead, scalp, neck, jawline and pretibial skin).  Mohs surgery is indicated for tumors in these anatomic locations.
Repair Hemostasis (Optional): Pinpoint electrocautery
Ear Star Wedge Flap Text: The defect edges were debeveled with a #15 blade scalpel.  Given the location of the defect and the proximity to free margins (helical rim) an ear star wedge flap was deemed most appropriate.  Using a sterile surgical marker, the appropriate flap was drawn incorporating the defect and placing the expected incisions between the helical rim and antihelix where possible.  The area thus outlined was incised through and through with a #15 scalpel blade.
Consent (Marginal Mandibular)/Introductory Paragraph: The rationale for Mohs was explained to the patient and consent was obtained. The risks, benefits and alternatives to therapy were discussed in detail. Specifically, the risks of damage to the marginal mandibular branch of the facial nerve, infection, scarring, bleeding, prolonged wound healing, incomplete removal, allergy to anesthesia, and recurrence were addressed. Prior to the procedure, the treatment site was clearly identified and confirmed by the patient. All components of Universal Protocol/PAUSE Rule completed.
Island Pedicle Flap Text: The defect edges were debeveled with a #15 scalpel blade.  Given the location of the defect, shape of the defect and the proximity to free margins an island pedicle advancement flap was deemed most appropriate.  Using a sterile surgical marker, an appropriate advancement flap was drawn incorporating the defect, outlining the appropriate donor tissue and placing the expected incisions within the relaxed skin tension lines where possible.    The area thus outlined was incised deep to adipose tissue with a #15 scalpel blade.  The skin margins were undermined to an appropriate distance in all directions around the primary defect and laterally outward around the island pedicle utilizing iris scissors.  There was minimal undermining beneath the pedicle flap.
Cheek Interpolation Flap Text: A decision was made to reconstruct the defect utilizing an interpolation axial flap and a staged reconstruction.  A telfa template was made of the defect.  This telfa template was then used to outline the Cheek Interpolation flap.  The donor area for the pedicle flap was then injected with anesthesia.  The flap was excised through the skin and subcutaneous tissue down to the layer of the underlying musculature.  The interpolation flap was carefully excised within this deep plane to maintain its blood supply.  The edges of the donor site were undermined.   The donor site was closed in a primary fashion.  The pedicle was then rotated into position and sutured.  Once the tube was sutured into place, adequate blood supply was confirmed with blanching and refill.  The pedicle was then wrapped with xeroform gauze and dressed appropriately with a telfa and gauze bandage to ensure continued blood supply and protect the attached pedicle.
Provider Procedure Text (E): After obtaining clear surgical margins the defect was repaired by another provider.
Tissue Cultured Epidermal Autograft Text: The defect edges were debeveled with a #15 scalpel blade.  Given the location of the defect, shape of the defect and the proximity to free margins a tissue cultured epidermal autograft was deemed most appropriate.  The graft was then trimmed to fit the size of the defect.  The graft was then placed in the primary defect and oriented appropriately.
Dressing (No Sutures): pressure dressing with telfa
Repair Type: Complex Repair
Surgical Defect Length In Cm (Optional): 1.3
Mohs Rapid Report Verbiage: The area of clinically evident tumor was marked with skin marking ink and appropriately hatched.  The initial incision was made following the Mohs approach through the skin.  The specimen was taken to the lab, divided into the necessary number of pieces, chromacoded and processed according to the Mohs protocol.  This was repeated in successive stages until a tumor free defect was achieved.
Number Of Stages: 2
Double O-Z Flap Text: The defect edges were debeveled with a #15 scalpel blade.  Given the location of the defect, shape of the defect and the proximity to free margins a Double O-Z flap was deemed most appropriate.  Using a sterile surgical marker, an appropriate transposition flap was drawn incorporating the defect and placing the expected incisions within the relaxed skin tension lines where possible. The area thus outlined was incised deep to adipose tissue with a #15 scalpel blade.  The skin margins were undermined to an appropriate distance in all directions utilizing iris scissors.
Hemigard Intro: Due to skin fragility and wound tension, it was decided to use HEMIGARD adhesive retention suture devices to permit a linear closure. The skin was cleaned and dried for a 6cm distance away from the wound. Excessive hair, if present, was removed to allow for adhesion.
Rhombic Flap Text: The defect edges were debeveled with a #15 scalpel blade.  Given the location of the defect and the proximity to free margins a rhombic flap was deemed most appropriate.  Using a sterile surgical marker, an appropriate rhombic flap was drawn incorporating the defect.    The area thus outlined was incised deep to adipose tissue with a #15 scalpel blade.  The skin margins were undermined to an appropriate distance in all directions utilizing iris scissors.
Mart-1 - Positive Histology Text: MART-1 staining demonstrates areas of higher density and clustering of melanocytes with Pagetoid spread upwards within the epidermis. The surgical margins are positive for tumor cells.
Complex Repair Preamble Text (Leave Blank If You Do Not Want): Extensive wide undermining was performed.
Cheek-To-Nose Interpolation Flap Text: A decision was made to reconstruct the defect utilizing an interpolation axial flap and a staged reconstruction.  A telfa template was made of the defect.  This telfa template was then used to outline the Cheek-To-Nose Interpolation flap.  The donor area for the pedicle flap was then injected with anesthesia.  The flap was excised through the skin and subcutaneous tissue down to the layer of the underlying musculature.  The interpolation flap was carefully excised within this deep plane to maintain its blood supply.  The edges of the donor site were undermined.   The donor site was closed in a primary fashion.  The pedicle was then rotated into position and sutured.  Once the tube was sutured into place, adequate blood supply was confirmed with blanching and refill.  The pedicle was then wrapped with xeroform gauze and dressed appropriately with a telfa and gauze bandage to ensure continued blood supply and protect the attached pedicle.
Double Z Plasty Text: The lesion was extirpated to the level of the fat with a #15 scalpel blade. Given the location of the defect, shape of the defect and the proximity to free margins a double Z-plasty was deemed most appropriate for repair. Using a sterile surgical marker, the appropriate transposition arms of the double Z-plasty were drawn incorporating the defect and placing the expected incisions within the relaxed skin tension lines where possible. The area thus outlined was incised deep to adipose tissue with a #15 scalpel blade. The skin margins were undermined to an appropriate distance in all directions utilizing iris scissors. The opposing transposition arms were then transposed and carried over into place in opposite direction and anchored with interrupted buried subcutaneous sutures.
Subsequent Stages Histo Method Verbiage: Using a similar technique to that described above, a thin layer of tissue was removed from all areas where tumor was visible on the previous stage.  The tissue was again oriented, mapped, dyed, and processed as above.
Presence Of Scar Tissue (For Histology): absent
Helical Rim Advancement Flap Text: The defect edges were debeveled with a #15 blade scalpel.  Given the location of the defect and the proximity to free margins (helical rim) a double helical rim advancement flap was deemed most appropriate.  Using a sterile surgical marker, the appropriate advancement flaps were drawn incorporating the defect and placing the expected incisions between the helical rim and antihelix where possible.  The area thus outlined was incised through and through with a #15 scalpel blade.  With a skin hook and iris scissors, the flaps were gently and sharply undermined and freed up.
Pinch Graft Text: The defect edges were debeveled with a #15 scalpel blade. Given the location of the defect, shape of the defect and the proximity to free margins a pinch graft was deemed most appropriate. Using a sterile surgical marker, the primary defect shape was transferred to the donor site. The area thus outlined was incised deep to adipose tissue with a #15 scalpel blade.  The harvested graft was then trimmed of adipose tissue until only dermis and epidermis was left. The skin margins of the secondary defect were undermined to an appropriate distance in all directions utilizing iris scissors.  The secondary defect was closed with interrupted buried subcutaneous sutures.  The skin edges were then re-apposed with running  sutures.  The skin graft was then placed in the primary defect and oriented appropriately.
Which Instrument Did You Use For Dermabrasion?: Wire Brush
Primary Defect Width In Cm (Final Defect Size - Required For Flaps/Grafts): 1
Graft Donor Site Bandage (Optional-Leave Blank If You Don't Want In Note): Aquaplast was fitted to the graft site and sewn into place. A pressure bandage were applied to the donor site and over the aquaplast bolster.
Pain Refusal Text: I offered to prescribe pain medication but the patient refused to take this medication.
Purse String (Intermediate) Text: Given the location of the defect and the characteristics of the surrounding skin a purse string intermediate closure was deemed most appropriate.  Undermining was performed circumfirentially around the surgical defect.  A purse string suture was then placed and tightened.
Localized Dermabrasion With Wire Brush Text: The patient was draped in routine manner.  Localized dermabrasion using 3 x 17 mm wire brush was performed in routine manner to papillary dermis. This spot dermabrasion is being performed to complete skin cancer reconstruction. It also will eliminate the other sun damaged precancerous cells that are known to be part of the regional effect of a lifetime's worth of sun exposure. This localized dermabrasion is therapeutic and should not be considered cosmetic in any regard.
Staging Info: By selecting yes to the question above you will include information on AJCC 8 tumor staging in your Mohs note. Information on tumor staging will be automatically added for SCCs on the head and neck. AJCC 8 includes tumor size, tumor depth, perineural involvement and bone invasion.
Consent (Temporal Branch)/Introductory Paragraph: The rationale for Mohs was explained to the patient and consent was obtained. The risks, benefits and alternatives to therapy were discussed in detail. Specifically, the risks of damage to the temporal branch of the facial nerve, infection, scarring, bleeding, prolonged wound healing, incomplete removal, allergy to anesthesia, and recurrence were addressed. Prior to the procedure, the treatment site was clearly identified and confirmed by the patient. All components of Universal Protocol/PAUSE Rule completed.
Dermal Autograft Text: The defect edges were debeveled with a #15 scalpel blade.  Given the location of the defect, shape of the defect and the proximity to free margins a dermal autograft was deemed most appropriate.  Using a sterile surgical marker, the primary defect shape was transferred to the donor site. The area thus outlined was incised deep to adipose tissue with a #15 scalpel blade.  The harvested graft was then trimmed of adipose and epidermal tissue until only dermis was left.  The skin graft was then placed in the primary defect and oriented appropriately.
Hatchet Flap Text: The defect edges were debeveled with a #15 scalpel blade.  Given the location of the defect, shape of the defect and the proximity to free margins a hatchet flap was deemed most appropriate.  Using a sterile surgical marker, an appropriate hatchet flap was drawn incorporating the defect and placing the expected incisions within the relaxed skin tension lines where possible.    The area thus outlined was incised deep to adipose tissue with a #15 scalpel blade.  The skin margins were undermined to an appropriate distance in all directions utilizing iris scissors.
Anesthesia Type: 1% lidocaine with epinephrine and a 1:10 solution of 8.4% sodium bicarbonate
W Plasty Text: The lesion was extirpated to the level of the fat with a #15 scalpel blade.  Given the location of the defect, shape of the defect and the proximity to free margins a W-plasty was deemed most appropriate for repair.  Using a sterile surgical marker, the appropriate transposition arms of the W-plasty were drawn incorporating the defect and placing the expected incisions within the relaxed skin tension lines where possible.    The area thus outlined was incised deep to adipose tissue with a #15 scalpel blade.  The skin margins were undermined to an appropriate distance in all directions utilizing iris scissors.  The opposing transposition arms were then transposed into place in opposite direction and anchored with interrupted buried subcutaneous sutures.
Tumor Depth: Less than 6mm from granular layer and no invasion beyond the subcutaneous fat
Wound Check: 6 weeks
O-T Advancement Flap Text: The defect edges were debeveled with a #15 scalpel blade.  Given the location of the defect, shape of the defect and the proximity to free margins an O-T advancement flap was deemed most appropriate.  Using a sterile surgical marker, an appropriate advancement flap was drawn incorporating the defect and placing the expected incisions within the relaxed skin tension lines where possible.    The area thus outlined was incised deep to adipose tissue with a #15 scalpel blade.  The skin margins were undermined to an appropriate distance in all directions utilizing iris scissors.
Flip-Flop Flap Text: The defect edges were debeveled with a #15 blade scalpel.  Given the location of the defect and the proximity to free margins a flip-flop flap was deemed most appropriate. Using a sterile surgical marker, the appropriate flap was drawn incorporating the defect and placing the expected incisions between the helical rim and antihelix where possible.  The area thus outlined was incised through and through with a #15 scalpel blade. Following this, the designed flap was carried over into the primary defect and sutured into place.
Ftsg Text: The defect edges were debeveled with a #15 scalpel blade.  Given the location of the defect, shape of the defect and the proximity to free margins a full thickness skin graft was deemed most appropriate.  Using a sterile surgical marker, the primary defect shape was transferred to the donor site. The area thus outlined was incised deep to adipose tissue with a #15 scalpel blade.  The harvested graft was then trimmed of adipose tissue until only dermis and epidermis was left.  The skin margins of the secondary defect were undermined to an appropriate distance in all directions utilizing iris scissors.  The secondary defect was closed with interrupted buried subcutaneous sutures.  The skin edges were then re-apposed with running  sutures.  The skin graft was then placed in the primary defect and oriented appropriately.
Closure 4 Information: This tab is for additional flaps and grafts above and beyond our usual structured repairs.  Please note if you enter information here it will not currently bill and you will need to add the billing information manually.
Partial Purse String (Intermediate) Text: Given the location of the defect and the characteristics of the surrounding skin an intermediate purse string closure was deemed most appropriate.  Undermining was performed circumfirentially around the surgical defect.  A purse string suture was then placed and tightened. Wound tension only allowed a partial closure of the circular defect.
A-T Advancement Flap Text: The defect edges were debeveled with a #15 scalpel blade.  Given the location of the defect, shape of the defect and the proximity to free margins an A-T advancement flap was deemed most appropriate.  Using a sterile surgical marker, an appropriate advancement flap was drawn incorporating the defect and placing the expected incisions within the relaxed skin tension lines where possible.    The area thus outlined was incised deep to adipose tissue with a #15 scalpel blade.  The skin margins were undermined to an appropriate distance in all directions utilizing iris scissors.
Graft Donor Site Dermal Sutures (Optional): 5-0 Polysorb
Consent (Spinal Accessory)/Introductory Paragraph: The rationale for Mohs was explained to the patient and consent was obtained. The risks, benefits and alternatives to therapy were discussed in detail. Specifically, the risks of damage to the spinal accessory nerve, infection, scarring, bleeding, prolonged wound healing, incomplete removal, allergy to anesthesia, and recurrence were addressed. Prior to the procedure, the treatment site was clearly identified and confirmed by the patient. All components of Universal Protocol/PAUSE Rule completed.
Xenograft Text: The defect edges were debeveled with a #15 scalpel blade.  Given the location of the defect, shape of the defect and the proximity to free margins a xenograft was deemed most appropriate.  The graft was then trimmed to fit the size of the defect.  The graft was then placed in the primary defect and oriented appropriately.
Graft Donor Site Epidermal Sutures (Optional): 5-0 Surgipro
Additional Anesthesia Volume In Cc: 3
Eyelid Partial Thickness Repair - 92978: The eyelid defect was partial thickness which required a wedge repair of the eyelid. Special care was taken to ensure that the eyelid margin was realligned when placing sutures.
Undermining Location (Optional): in the superficial subcutaneous fat
Skin Substitute Text: The defect edges were debeveled with a #15 scalpel blade.  Given the location of the defect, shape of the defect and the proximity to free margins a skin substitute graft was deemed most appropriate.  The graft material was trimmed to fit the size of the defect. The graft was then placed in the primary defect and oriented appropriately.
O-L Flap Text: The defect edges were debeveled with a #15 scalpel blade.  Given the location of the defect, shape of the defect and the proximity to free margins an O-L flap was deemed most appropriate.  Using a sterile surgical marker, an appropriate advancement flap was drawn incorporating the defect and placing the expected incisions within the relaxed skin tension lines where possible.    The area thus outlined was incised deep to adipose tissue with a #15 scalpel blade.  The skin margins were undermined to an appropriate distance in all directions utilizing iris scissors.
Localized Dermabrasion With 15 Blade Text: The patient was draped in routine manner.  Localized dermabrasion using a 15 blade was performed in routine manner to papillary dermis. This spot dermabrasion is being performed to complete skin cancer reconstruction. It also will eliminate the other sun damaged precancerous cells that are known to be part of the regional effect of a lifetime's worth of sun exposure. This localized dermabrasion is therapeutic and should not be considered cosmetic in any regard.
Abbe Flap (Lower To Upper Lip) Text: The defect of the upper lip was assessed and measured.  Given the location and size of the defect, an Abbe flap was deemed most appropriate.  Using a sterile surgical marker, an appropriate Abbe flap was measured and drawn on the lower lip. Local anesthesia was then infiltrated. A scalpel was then used to incise the upper lip through and through the skin, vermilion, muscle and mucosa, leaving the flap pedicled on the opposite side.  The flap was then rotated and transferred to the lower lip defect.  The flap was then sutured into place with a three layer technique, closing the orbicularis oris muscle layer with subcutaneous buried sutures, followed by a mucosal layer and an epidermal layer.
Consent Type: Consent 1 (Standard)
Complex Repair And Flap Additional Text (Will Appearing After The Standard Complex Repair Text): The complex repair was not sufficient to completely close the primary defect. The remaining additional defect was repaired with the flap mentioned below.
Surgical Defect Width In Cm (Optional): 0.7
No Residual Tumor Seen Histology Text: There were no malignant cells seen in the sections examined.
Is There Documentation In The Chart Showing Discussion Of Changes With Another Physician?: Please Select the Appropriate Response
Tarsorrhaphy Text: A tarsorrhaphy was performed using Frost sutures.
Epidermal Closure: running cuticular
Purse String (Simple) Text: Given the location of the defect and the characteristics of the surrounding skin a purse string closure was deemed most appropriate.  Undermining was performed circumfirentially around the surgical defect.  A purse string suture was then placed and tightened.
Bcc Histology Text: There were numerous aggregates of basaloid cells.
Suturegard Intro: Intraoperative tissue expansion was performed, utilizing the SUTUREGARD device, in order to reduce wound tension.
Mohs Case Number: 
Intermediate Repair And Graft Additional Text (Will Appearing After The Standard Complex Repair Text): The intermediate repair was not sufficient to completely close the primary defect. The remaining additional defect was repaired with the graft mentioned below.
Inflammation Suggestive Of Cancer Camouflage Histology Text: There was a dense lymphocytic infiltrate which prevented adequate histologic evaluation of adjacent structures.
Double Island Pedicle Flap Text: The defect edges were debeveled with a #15 scalpel blade.  Given the location of the defect, shape of the defect and the proximity to free margins a double island pedicle advancement flap was deemed most appropriate.  Using a sterile surgical marker, an appropriate advancement flap was drawn incorporating the defect, outlining the appropriate donor tissue and placing the expected incisions within the relaxed skin tension lines where possible.    The area thus outlined was incised deep to adipose tissue with a #15 scalpel blade.  The skin margins were undermined to an appropriate distance in all directions around the primary defect and laterally outward around the island pedicle utilizing iris scissors.  There was minimal undermining beneath the pedicle flap.
Surgeon/Pathologist Verbiage (Will Incorporate Name Of Surgeon From Intro If Not Blank): operated in two distinct and integrated capacities as the surgeon and pathologist.
Bilateral Rotation Flap Text: The defect edges were debeveled with a #15 scalpel blade. Given the location of the defect, shape of the defect and the proximity to free margins a bilateral rotation flap was deemed most appropriate. Using a sterile surgical marker, an appropriate rotation flap was drawn incorporating the defect and placing the expected incisions within the relaxed skin tension lines where possible. The area thus outlined was incised deep to adipose tissue with a #15 scalpel blade. The skin margins were undermined to an appropriate distance in all directions utilizing iris scissors. Following this, the designed flap was carried over into the primary defect and sutured into place.
Location Indication Override (Is Already Calculated Based On Selected Body Location): Area M
Consent (Scalp)/Introductory Paragraph: The rationale for Mohs was explained to the patient and consent was obtained. The risks, benefits and alternatives to therapy were discussed in detail. Specifically, the risks of changes in hair growth pattern secondary to repair, infection, scarring, bleeding, prolonged wound healing, incomplete removal, allergy to anesthesia, nerve injury and recurrence were addressed. Prior to the procedure, the treatment site was clearly identified and confirmed by the patient. All components of Universal Protocol/PAUSE Rule completed.
Mart-1 - Negative Histology Text: MART-1 staining demonstrates a normal density and pattern of melanocytes along the dermal-epidermal junction. The surgical margins are negative for tumor cells.
Tumor Debulked?: curette
Consent (Ear)/Introductory Paragraph: The rationale for Mohs was explained to the patient and consent was obtained. The risks, benefits and alternatives to therapy were discussed in detail. Specifically, the risks of ear deformity, infection, scarring, bleeding, prolonged wound healing, incomplete removal, allergy to anesthesia, nerve injury and recurrence were addressed. Prior to the procedure, the treatment site was clearly identified and confirmed by the patient. All components of Universal Protocol/PAUSE Rule completed.
Which Eyelid Repair Cpt Are You Using?: 51839
Modified Advancement Flap Text: The defect edges were debeveled with a #15 scalpel blade.  Given the location of the defect, shape of the defect and the proximity to free margins a modified advancement flap was deemed most appropriate.  Using a sterile surgical marker, an appropriate advancement flap was drawn incorporating the defect and placing the expected incisions within the relaxed skin tension lines where possible.    The area thus outlined was incised deep to adipose tissue with a #15 scalpel blade.  The skin margins were undermined to an appropriate distance in all directions utilizing iris scissors.
Island Pedicle Flap With Canthal Suspension Text: The defect edges were debeveled with a #15 scalpel blade.  Given the location of the defect, shape of the defect and the proximity to free margins an island pedicle advancement flap was deemed most appropriate.  Using a sterile surgical marker, an appropriate advancement flap was drawn incorporating the defect, outlining the appropriate donor tissue and placing the expected incisions within the relaxed skin tension lines where possible. The area thus outlined was incised deep to adipose tissue with a #15 scalpel blade.  The skin margins were undermined to an appropriate distance in all directions around the primary defect and laterally outward around the island pedicle utilizing iris scissors.  There was minimal undermining beneath the pedicle flap. A suspension suture was placed in the canthal tendon to prevent tension and prevent ectropion.
Dorsal Nasal Flap Text: The defect edges were debeveled with a #15 scalpel blade.  Given the location of the defect and the proximity to free margins a dorsal nasal flap was deemed most appropriate.  Using a sterile surgical marker, an appropriate dorsal nasal flap was drawn around the defect.    The area thus outlined was incised deep to adipose tissue with a #15 scalpel blade.  The skin margins were undermined to an appropriate distance in all directions utilizing iris scissors.
V-Y Flap Text: The defect edges were debeveled with a #15 scalpel blade.  Given the location of the defect, shape of the defect and the proximity to free margins a V-Y flap was deemed most appropriate.  Using a sterile surgical marker, an appropriate advancement flap was drawn incorporating the defect and placing the expected incisions within the relaxed skin tension lines where possible.    The area thus outlined was incised deep to adipose tissue with a #15 scalpel blade.  The skin margins were undermined to an appropriate distance in all directions utilizing iris scissors.
Bilobed Flap Text: The defect edges were debeveled with a #15 scalpel blade.  Given the location of the defect and the proximity to free margins a bilobe flap was deemed most appropriate.  Using a sterile surgical marker, an appropriate bilobe flap drawn around the defect.    The area thus outlined was incised deep to adipose tissue with a #15 scalpel blade.  The skin margins were undermined to an appropriate distance in all directions utilizing iris scissors.
Complex/Intermediate Repair Variations: Lazy S
Home Suture Removal Text: Patient was provided instructions on removing sutures and will remove their sutures at home.  If they have any questions or difficulties they will call the office.
Nasolabial Transposition Flap Text: The defect edges were debeveled with a #15 scalpel blade.  Given the size, depth and location of the defect and the proximity to free margins a nasolabial transposition flap was deemed most appropriate. Using a sterile surgical marker, an appropriate flap was drawn incorporating the defect. The area thus outlined was incised with a #15 scalpel blade. The skin margins were undermined to an appropriate distance in all directions utilizing iris scissors. Following this, the designed flap was carried into the primary defect and sutured into place.
Mohs Histo Method Verbiage: Each section was then chromacoded and processed in the Mohs lab using the Mohs protocol and submitted for frozen section.
Mauc Instructions: By selecting yes to the question below the MAUC number will be added into the note.  This will be calculated automatically based on the diagnosis chosen, the size entered, the body zone selected (H,M,L) and the specific indications you chose. You will also have the option to override the Mohs AUC if you disagree with the automatically calculated number and this option is found in the Case Summary tab.
Hemostasis: Electrocautery
Epidermal Autograft Text: The defect edges were debeveled with a #15 scalpel blade.  Given the location of the defect, shape of the defect and the proximity to free margins an epidermal autograft was deemed most appropriate.  Using a sterile surgical marker, the primary defect shape was transferred to the donor site. The epidermal graft was then harvested.  The skin graft was then placed in the primary defect and oriented appropriately.
Burow's Graft Text: The defect edges were debeveled with a #15 scalpel blade.  Given the location of the defect, shape of the defect, the proximity to free margins and the presence of a standing cone deformity a Burow's skin graft was deemed most appropriate. The standing cone was removed and this tissue was then trimmed to the shape of the primary defect. The adipose tissue was also removed until only dermis and epidermis were left.  The skin margins of the secondary defect were undermined to an appropriate distance in all directions utilizing iris scissors.  The secondary defect was closed with interrupted buried subcutaneous sutures.  The skin edges were then re-apposed with running  sutures.  The skin graft was then placed in the primary defect and oriented appropriately.
Suturegard Retention Suture: 0-0 Nylon
Nasalis-Muscle-Based Myocutaneous Island Pedicle Flap Text: Using a #15 blade, an incision was made around the donor flap to the level of the nasalis muscle. Wide lateral undermining was then performed in both the subcutaneous plane above the nasalis muscle, and in a submuscular plane just above periosteum. This allowed the formation of a free nasalis muscle axial pedicle (based on the angular artery) which was still attached to the actual cutaneous flap, increasing its mobility and vascular viability. Hemostasis was obtained with pinpoint electrocoagulation. The flap was mobilized into position and the pivotal anchor points positioned and stabilized with buried interrupted sutures. Subcutaneous and dermal tissues were closed in a multilayered fashion with sutures. Tissue redundancies were excised, and the epidermal edges were apposed without significant tension and sutured with sutures.
Debridement Text: The wound edges were debrided prior to proceeding with the closure to facilitate wound healing.
Vermilion Border Text: The closure involved the vermilion border.
Peng Advancement Flap Text: The defect edges were debeveled with a #15 scalpel blade.  Given the location of the defect, shape of the defect and the proximity to free margins a Peng advancement flap was deemed most appropriate.  Using a sterile surgical marker, an appropriate advancement flap was drawn incorporating the defect and placing the expected incisions within the relaxed skin tension lines where possible. The area thus outlined was incised deep to adipose tissue with a #15 scalpel blade.  The skin margins were undermined to an appropriate distance in all directions utilizing iris scissors.
V-Y Plasty Text: The defect edges were debeveled with a #15 scalpel blade.  Given the location of the defect, shape of the defect and the proximity to free margins an V-Y advancement flap was deemed most appropriate.  Using a sterile surgical marker, an appropriate advancement flap was drawn incorporating the defect and placing the expected incisions within the relaxed skin tension lines where possible.    The area thus outlined was incised deep to adipose tissue with a #15 scalpel blade.  The skin margins were undermined to an appropriate distance in all directions utilizing iris scissors.
O-T Plasty Text: The defect edges were debeveled with a #15 scalpel blade.  Given the location of the defect, shape of the defect and the proximity to free margins an O-T plasty was deemed most appropriate.  Using a sterile surgical marker, an appropriate O-T plasty was drawn incorporating the defect and placing the expected incisions within the relaxed skin tension lines where possible.    The area thus outlined was incised deep to adipose tissue with a #15 scalpel blade.  The skin margins were undermined to an appropriate distance in all directions utilizing iris scissors.
Medical Necessity Statement: Based on my medical judgement, Mohs surgery is the most appropriate treatment for this cancer compared to other treatments.
Keystone Flap Text: The defect edges were debeveled with a #15 scalpel blade.  Given the location of the defect, shape of the defect a keystone flap was deemed most appropriate.  Using a sterile surgical marker, an appropriate keystone flap was drawn incorporating the defect, outlining the appropriate donor tissue and placing the expected incisions within the relaxed skin tension lines where possible. The area thus outlined was incised deep to adipose tissue with a #15 scalpel blade.  The skin margins were undermined to an appropriate distance in all directions around the primary defect and laterally outward around the flap utilizing iris scissors.
Trilobed Flap Text: The defect edges were debeveled with a #15 scalpel blade.  Given the location of the defect and the proximity to free margins a trilobed flap was deemed most appropriate.  Using a sterile surgical marker, an appropriate trilobed flap drawn around the defect.    The area thus outlined was incised deep to adipose tissue with a #15 scalpel blade.  The skin margins were undermined to an appropriate distance in all directions utilizing iris scissors.
Consent 1/Introductory Paragraph: The rationale for Mohs was explained to the patient and consent was obtained. The risks, benefits and alternatives to therapy were discussed in detail. Specifically, the risks of infection, scarring, bleeding, prolonged wound healing, incomplete removal, allergy to anesthesia, nerve injury and recurrence were addressed. Prior to the procedure, the treatment site was clearly identified and confirmed by the patient. All components of Universal Protocol/PAUSE Rule completed.
Intermediate Repair And Flap Additional Text (Will Appearing After The Standard Complex Repair Text): The intermediate repair was not sufficient to completely close the primary defect. The remaining additional defect was repaired with the flap mentioned below.
Burow's Advancement Flap Text: The defect edges were debeveled with a #15 scalpel blade.  Given the location of the defect and the proximity to free margins a Burow's advancement flap was deemed most appropriate.  Using a sterile surgical marker, the appropriate advancement flap was drawn incorporating the defect and placing the expected incisions within the relaxed skin tension lines where possible.    The area thus outlined was incised deep to adipose tissue with a #15 scalpel blade.  The skin margins were undermined to an appropriate distance in all directions utilizing iris scissors.
Zygomaticofacial Flap Text: Given the location of the defect, shape of the defect and the proximity to free margins a zygomaticofacial flap was deemed most appropriate for repair.  Using a sterile surgical marker, the appropriate flap was drawn incorporating the defect and placing the expected incisions within the relaxed skin tension lines where possible. The area thus outlined was incised deep to adipose tissue with a #15 scalpel blade with preservation of a vascular pedicle.  The skin margins were undermined to an appropriate distance in all directions utilizing iris scissors.  The flap was then placed into the defect and anchored with interrupted buried subcutaneous sutures.
Z Plasty Text: The lesion was extirpated to the level of the fat with a #15 scalpel blade.  Given the location of the defect, shape of the defect and the proximity to free margins a Z-plasty was deemed most appropriate for repair.  Using a sterile surgical marker, the appropriate transposition arms of the Z-plasty were drawn incorporating the defect and placing the expected incisions within the relaxed skin tension lines where possible.    The area thus outlined was incised deep to adipose tissue with a #15 scalpel blade.  The skin margins were undermined to an appropriate distance in all directions utilizing iris scissors.  The opposing transposition arms were then transposed into place in opposite direction and anchored with interrupted buried subcutaneous sutures.
Area L Indication Text: Tumors in this location are included in Area L (trunk and extremities).  Mohs surgery is indicated for larger tumors, or tumors with aggressive histologic features, in these anatomic locations.
Muscle Hinge Flap Text: The defect edges were debeveled with a #15 scalpel blade.  Given the size, depth and location of the defect and the proximity to free margins a muscle hinge flap was deemed most appropriate.  Using a sterile surgical marker, an appropriate hinge flap was drawn incorporating the defect. The area thus outlined was incised with a #15 scalpel blade.  The skin margins were undermined to an appropriate distance in all directions utilizing iris scissors.
Advancement-Rotation Flap Text: The defect edges were debeveled with a #15 scalpel blade.  Given the location of the defect, shape of the defect and the proximity to free margins an advancement-rotation flap was deemed most appropriate.  Using a sterile surgical marker, an appropriate flap was drawn incorporating the defect and placing the expected incisions within the relaxed skin tension lines where possible. The area thus outlined was incised deep to adipose tissue with a #15 scalpel blade.  The skin margins were undermined to an appropriate distance in all directions utilizing iris scissors.
Suture Removal: 7 days
Surgeon Performing Repair (Optional): Justo Wilder MD
Hemigard Postcare Instructions: The HEMIGARD strips are to remain completely dry for at least 5-7 days.
Mucosal Advancement Flap Text: Given the location of the defect, shape of the defect and the proximity to free margins a mucosal advancement flap was deemed most appropriate. Incisions were made with a 15 blade scalpel in the appropriate fashion along the cutaneous vermilion border and the mucosal lip. The remaining actinically damaged mucosal tissue was excised.  The mucosal advancement flap was then elevated to the gingival sulcus with care taken to preserve the neurovascular structures and advanced into the primary defect. Care was taken to ensure that precise realignment of the vermilion border was achieved.
Consent 2/Introductory Paragraph: Mohs surgery was explained to the patient and consent was obtained. The risks, benefits and alternatives to therapy were discussed in detail. Specifically, the risks of infection, scarring, bleeding, prolonged wound healing, incomplete removal, allergy to anesthesia, nerve injury and recurrence were addressed. Prior to the procedure, the treatment site was clearly identified and confirmed by the patient. All components of Universal Protocol/PAUSE Rule completed.
Split-Thickness Skin Graft Text: The defect edges were debeveled with a #15 scalpel blade.  Given the location of the defect, shape of the defect and the proximity to free margins a split thickness skin graft was deemed most appropriate.  Using a sterile surgical marker, the primary defect shape was transferred to the donor site. The split thickness graft was then harvested.  The skin graft was then placed in the primary defect and oriented appropriately.
Orbicularis Oris Muscle Flap Text: The defect edges were debeveled with a #15 scalpel blade.  Given that the defect affected the competency of the oral sphincter an orbicularis oris muscle flap was deemed most appropriate to restore this competency and normal muscle function.  Using a sterile surgical marker, an appropriate flap was drawn incorporating the defect. The area thus outlined was incised with a #15 scalpel blade.
Rhomboid Transposition Flap Text: The defect edges were debeveled with a #15 scalpel blade.  Given the location of the defect and the proximity to free margins a rhomboid transposition flap was deemed most appropriate.  Using a sterile surgical marker, an appropriate rhomboid flap was drawn incorporating the defect.    The area thus outlined was incised deep to adipose tissue with a #15 scalpel blade.  The skin margins were undermined to an appropriate distance in all directions utilizing iris scissors.
Same Histology In Subsequent Stages Text: The pattern and morphology of the tumor is as described in the first stage.
Nasal Turnover Hinge Flap Text: The defect edges were debeveled with a #15 scalpel blade.  Given the size, depth, location of the defect and the defect being full thickness a nasal turnover hinge flap was deemed most appropriate.  Using a sterile surgical marker, an appropriate hinge flap was drawn incorporating the defect. The area thus outlined was incised with a #15 scalpel blade. The flap was designed to recreate the nasal mucosal lining and the alar rim. The skin margins were undermined to an appropriate distance in all directions utilizing iris scissors.
Cartilage Graft Text: The defect edges were debeveled with a #15 scalpel blade.  Given the location of the defect, shape of the defect, the fact the defect involved a full thickness cartilage defect a cartilage graft was deemed most appropriate.  An appropriate donor site was identified, cleansed, and anesthetized. The cartilage graft was then harvested and transferred to the recipient site, oriented appropriately and then sutured into place.  The secondary defect was then repaired using a primary closure.
Donor Site Anesthesia Type: same as repair anesthesia
Partial Purse String (Simple) Text: Given the location of the defect and the characteristics of the surrounding skin a simple purse string closure was deemed most appropriate.  Undermining was performed circumfirentially around the surgical defect.  A purse string suture was then placed and tightened. Wound tension only allowed a partial closure of the circular defect.
O-Z Plasty Text: The defect edges were debeveled with a #15 scalpel blade.  Given the location of the defect, shape of the defect and the proximity to free margins an O-Z plasty (double transposition flap) was deemed most appropriate.  Using a sterile surgical marker, the appropriate transposition flaps were drawn incorporating the defect and placing the expected incisions within the relaxed skin tension lines where possible.    The area thus outlined was incised deep to adipose tissue with a #15 scalpel blade.  The skin margins were undermined to an appropriate distance in all directions utilizing iris scissors.  Hemostasis was achieved with electrocautery.  The flaps were then transposed into place, one clockwise and the other counterclockwise, and anchored with interrupted buried subcutaneous sutures.
Localized Dermabrasion With Sand Papertext: The patient was draped in routine manner.  Localized dermabrasion using sterile sand paper was performed in routine manner to papillary dermis. This spot dermabrasion is being performed to complete skin cancer reconstruction. It also will eliminate the other sun damaged precancerous cells that are known to be part of the regional effect of a lifetime's worth of sun exposure. This localized dermabrasion is therapeutic and should not be considered cosmetic in any regard.
Detail Level: Detailed
Alternatives Discussed Intro (Do Not Add Period): I discussed alternative treatments to Mohs surgery and specifically discussed the risks and benefits of
Crescentic Advancement Flap Text: The defect edges were debeveled with a #15 scalpel blade.  Given the location of the defect and the proximity to free margins a crescentic advancement flap was deemed most appropriate.  Using a sterile surgical marker, the appropriate advancement flap was drawn incorporating the defect and placing the expected incisions within the relaxed skin tension lines where possible.    The area thus outlined was incised deep to adipose tissue with a #15 scalpel blade.  The skin margins were undermined to an appropriate distance in all directions utilizing iris scissors.
Retention Suture Bite Size: 1 mm
Unna Boot Text: An Unna boot was placed to help immobilize the limb and facilitate more rapid healing.
Mustarde Flap Text: The defect edges were debeveled with a #15 scalpel blade.  Given the size, depth and location of the defect and the proximity to free margins a Mustarde flap was deemed most appropriate.  Using a sterile surgical marker, an appropriate flap was drawn incorporating the defect. The area thus outlined was incised with a #15 scalpel blade.  The skin margins were undermined to an appropriate distance in all directions utilizing iris scissors.
Where Do You Want The Question To Include Opioid Counseling Located?: Case Summary Tab
Banner Transposition Flap Text: The defect edges were debeveled with a #15 scalpel blade.  Given the location of the defect and the proximity to free margins a Banner transposition flap was deemed most appropriate.  Using a sterile surgical marker, an appropriate flap drawn around the defect. The area thus outlined was incised deep to adipose tissue with a #15 scalpel blade.  The skin margins were undermined to an appropriate distance in all directions utilizing iris scissors.
Bill 59 Modifier?: No - Continue to Bill 79 Modifier
Referring Physician (Optional): Tucker
Surgeon: Justo Ma
Consent (Near Eyelid Margin)/Introductory Paragraph: The rationale for Mohs was explained to the patient and consent was obtained. The risks, benefits and alternatives to therapy were discussed in detail. Specifically, the risks of ectropion or eyelid deformity, infection, scarring, bleeding, prolonged wound healing, incomplete removal, allergy to anesthesia, nerve injury and recurrence were addressed. Prior to the procedure, the treatment site was clearly identified and confirmed by the patient. All components of Universal Protocol/PAUSE Rule completed.
Paramedian Forehead Flap Text: A decision was made to reconstruct the defect utilizing an interpolation axial flap and a staged reconstruction.  A telfa template was made of the defect.  This telfa template was then used to outline the paramedian forehead pedicle flap.  The donor area for the pedicle flap was then injected with anesthesia.  The flap was excised through the skin and subcutaneous tissue down to the layer of the underlying musculature.  The pedicle flap was carefully excised within this deep plane to maintain its blood supply.  The edges of the donor site were undermined.   The donor site was closed in a primary fashion.  The pedicle was then rotated into position and sutured.  Once the tube was sutured into place, adequate blood supply was confirmed with blanching and refill.  The pedicle was then wrapped with xeroform gauze and dressed appropriately with a telfa and gauze bandage to ensure continued blood supply and protect the attached pedicle.
Closure 2 Information: This tab is for additional flaps and grafts, including complex repair and grafts and complex repair and flaps. You can also specify a different location for the additional defect, if the location is the same you do not need to select a new one. We will insert the automated text for the repair you select below just as we do for solitary flaps and grafts. Please note that at this time if you select a location with a different insurance zone you will need to override the ICD10 and CPT if appropriate.
Ear Wedge Repair Text: A wedge excision was completed by carrying down an excision through the full thickness of the ear and cartilage with an inward facing Burow's triangle. The wound was then closed in a layered fashion.
Postop Diagnosis: same
H Plasty Text: Given the location of the defect, shape of the defect and the proximity to free margins a H-plasty was deemed most appropriate for repair.  Using a sterile surgical marker, the appropriate advancement arms of the H-plasty were drawn incorporating the defect and placing the expected incisions within the relaxed skin tension lines where possible. The area thus outlined was incised deep to adipose tissue with a #15 scalpel blade. The skin margins were undermined to an appropriate distance in all directions utilizing iris scissors.  The opposing advancement arms were then advanced into place in opposite direction and anchored with interrupted buried subcutaneous sutures.
Advancement Flap (Single) Text: The defect edges were debeveled with a #15 scalpel blade.  Given the location of the defect and the proximity to free margins a single advancement flap was deemed most appropriate.  Using a sterile surgical marker, an appropriate advancement flap was drawn incorporating the defect and placing the expected incisions within the relaxed skin tension lines where possible.    The area thus outlined was incised deep to adipose tissue with a #15 scalpel blade.  The skin margins were undermined to an appropriate distance in all directions utilizing iris scissors.
X Size Of Lesion In Cm (Optional): 0.5
Information: Selecting Yes will display possible errors in your note based on the variables you have selected. This validation is only offered as a suggestion for you. PLEASE NOTE THAT THE VALIDATION TEXT WILL BE REMOVED WHEN YOU FINALIZE YOUR NOTE. IF YOU WANT TO FAX A PRELIMINARY NOTE YOU WILL NEED TO TOGGLE THIS TO 'NO' IF YOU DO NOT WANT IT IN YOUR FAXED NOTE.
Advancement Flap (Double) Text: The defect edges were debeveled with a #15 scalpel blade.  Given the location of the defect and the proximity to free margins a double advancement flap was deemed most appropriate.  Using a sterile surgical marker, the appropriate advancement flaps were drawn incorporating the defect and placing the expected incisions within the relaxed skin tension lines where possible.    The area thus outlined was incised deep to adipose tissue with a #15 scalpel blade.  The skin margins were undermined to an appropriate distance in all directions utilizing iris scissors.
Rectangular Flap Text: The defect edges were debeveled with a #15 scalpel blade. Given the location of the defect and the proximity to free margins a rectangular flap was deemed most appropriate. Using a sterile surgical marker, an appropriate rectangular flap was drawn incorporating the defect. The area thus outlined was incised deep to adipose tissue with a #15 scalpel blade. The skin margins were undermined to an appropriate distance in all directions utilizing iris scissors. Following this, the designed flap was carried over into the primary defect and sutured into place.
Deep Sutures: 5-0 Maxon
Wound Care: Vaseline
Bcc Infiltrative Histology Text: There were numerous aggregates of basaloid cells demonstrating an infiltrative pattern.
Undermining Type: Entire Wound
Suturegard Body: The suture ends were repeatedly re-tightened and re-clamped to achieve the desired tissue expansion.
Graft Cartilage Fenestration Text: The cartilage was fenestrated with a 2mm punch biopsy to help facilitate graft survival and healing.
Eye Protection Verbiage: Before proceeding with the stage, a plastic scleral shield was inserted. The globe was anesthetized with a few drops of 1% lidocaine with 1:100,000 epinephrine. Then, an appropriate sized scleral shield was chosen and coated with lacrilube ointment. The shield was gently inserted and left in place for the duration of each stage. After the stage was completed, the shield was gently removed.
Previous Accession (Optional): [id number]
Complex Repair And Graft Additional Text (Will Appearing After The Standard Complex Repair Text): The complex repair was not sufficient to completely close the primary defect. The remaining additional defect was repaired with the graft mentioned below.
Estimated Blood Loss (Cc): minimal
Simple / Intermediate / Complex Repair - Final Wound Length In Cm: 3.2
Initial Size Of Lesion: 0.8
Helical Rim Text: The closure involved the helical rim.
Cheiloplasty (Less Than 50%) Text: A decision was made to reconstruct the defect with a  cheiloplasty.  The defect was undermined extensively.  Additional obicularis oris muscle was excised with a 15 blade scalpel.  The defect was converted into a full thickness wedge, of less than 50% of the vertical height of the lip, to facilite a better cosmetic result.  Small vessels were then tied off with 5-0 monocyrl. The obicularis oris, superficial fascia, adipose and dermis were then reapproximated.  After the deeper layers were approximated the epidermis was reapproximated with particular care given to realign the vermilion border.
Non-Graft Cartilage Fenestration Text: The cartilage was fenestrated with a 2mm punch biopsy to help facilitate healing.
Consent (Nose)/Introductory Paragraph: The rationale for Mohs was explained to the patient and consent was obtained. The risks, benefits and alternatives to therapy were discussed in detail. Specifically, the risks of nasal deformity, changes in the flow of air through the nose, infection, scarring, bleeding, prolonged wound healing, incomplete removal, allergy to anesthesia, nerve injury and recurrence were addressed. Prior to the procedure, the treatment site was clearly identified and confirmed by the patient. All components of Universal Protocol/PAUSE Rule completed.
Mastoid Interpolation Flap Text: A decision was made to reconstruct the defect utilizing an interpolation axial flap and a staged reconstruction.  A telfa template was made of the defect.  This telfa template was then used to outline the mastoid interpolation flap.  The donor area for the pedicle flap was then injected with anesthesia.  The flap was excised through the skin and subcutaneous tissue down to the layer of the underlying musculature.  The pedicle flap was carefully excised within this deep plane to maintain its blood supply.  The edges of the donor site were undermined.   The donor site was closed in a primary fashion.  The pedicle was then rotated into position and sutured.  Once the tube was sutured into place, adequate blood supply was confirmed with blanching and refill.  The pedicle was then wrapped with xeroform gauze and dressed appropriately with a telfa and gauze bandage to ensure continued blood supply and protect the attached pedicle.
Manual Repair Warning Statement: We plan on removing the manually selected variable below in favor of our much easier automatic structured text blocks found in the previous tab. We decided to do this to help make the flow better and give you the full power of structured data. Manual selection is never going to be ideal in our platform and I would encourage you to avoid using manual selection from this point on, especially since I will be sunsetting this feature. It is important that you do one of two things with the customized text below. First, you can save all of the text in a word file so you can have it for future reference. Second, transfer the text to the appropriate area in the Library tab. Lastly, if there is a flap or graft type which we do not have you need to let us know right away so I can add it in before the variable is hidden. No need to panic, we plan to give you roughly 6 months to make the change.
Bilateral Helical Rim Advancement Flap Text: The defect edges were debeveled with a #15 blade scalpel.  Given the location of the defect and the proximity to free margins (helical rim) a bilateral helical rim advancement flap was deemed most appropriate.  Using a sterile surgical marker, the appropriate advancement flaps were drawn incorporating the defect and placing the expected incisions between the helical rim and antihelix where possible.  The area thus outlined was incised through and through with a #15 scalpel blade.  With a skin hook and iris scissors, the flaps were gently and sharply undermined and freed up.
Rotation Flap Text: The defect edges were debeveled with a #15 scalpel blade.  Given the location of the defect, shape of the defect and the proximity to free margins a rotation flap was deemed most appropriate.  Using a sterile surgical marker, an appropriate rotation flap was drawn incorporating the defect and placing the expected incisions within the relaxed skin tension lines where possible.    The area thus outlined was incised deep to adipose tissue with a #15 scalpel blade.  The skin margins were undermined to an appropriate distance in all directions utilizing iris scissors.
Composite Graft Text: The defect edges were debeveled with a #15 scalpel blade.  Given the location of the defect, shape of the defect, the proximity to free margins and the fact the defect was full thickness a composite graft was deemed most appropriate.  The defect was outline and then transferred to the donor site.  A full thickness graft was then excised from the donor site. The graft was then placed in the primary defect, oriented appropriately and then sutured into place.  The secondary defect was then repaired using a primary closure.
Melolabial Transposition Flap Text: The defect edges were debeveled with a #15 scalpel blade.  Given the location of the defect and the proximity to free margins a melolabial flap was deemed most appropriate.  Using a sterile surgical marker, an appropriate melolabial transposition flap was drawn incorporating the defect.    The area thus outlined was incised deep to adipose tissue with a #15 scalpel blade.  The skin margins were undermined to an appropriate distance in all directions utilizing iris scissors.
Posterior Auricular Interpolation Flap Text: A decision was made to reconstruct the defect utilizing an interpolation axial flap and a staged reconstruction.  A telfa template was made of the defect.  This telfa template was then used to outline the posterior auricular interpolation flap.  The donor area for the pedicle flap was then injected with anesthesia.  The flap was excised through the skin and subcutaneous tissue down to the layer of the underlying musculature.  The pedicle flap was carefully excised within this deep plane to maintain its blood supply.  The edges of the donor site were undermined.   The donor site was closed in a primary fashion.  The pedicle was then rotated into position and sutured.  Once the tube was sutured into place, adequate blood supply was confirmed with blanching and refill.  The pedicle was then wrapped with xeroform gauze and dressed appropriately with a telfa and gauze bandage to ensure continued blood supply and protect the attached pedicle.
Chonodrocutaneous Helical Advancement Flap Text: The defect edges were debeveled with a #15 scalpel blade.  Given the location of the defect and the proximity to free margins a chondrocutaneous helical advancement flap was deemed most appropriate.  Using a sterile surgical marker, the appropriate advancement flap was drawn incorporating the defect and placing the expected incisions within the relaxed skin tension lines where possible.    The area thus outlined was incised deep to adipose tissue with a #15 scalpel blade.  The skin margins were undermined to an appropriate distance in all directions utilizing iris scissors.
Depth Of Tumor Invasion (For Histology): dermis
Adjacent Tissue Transfer Text: The defect edges were debeveled with a #15 scalpel blade.  Given the location of the defect and the proximity to free margins an adjacent tissue transfer was deemed most appropriate.  Using a sterile surgical marker, an appropriate flap was drawn incorporating the defect and placing the expected incisions within the relaxed skin tension lines where possible.    The area thus outlined was incised deep to adipose tissue with a #15 scalpel blade.  The skin margins were undermined to an appropriate distance in all directions utilizing iris scissors.

## 2025-04-28 ENCOUNTER — APPOINTMENT (OUTPATIENT)
Dept: URBAN - METROPOLITAN AREA CLINIC 36 | Facility: CLINIC | Age: 85
Setting detail: DERMATOLOGY
End: 2025-04-28

## 2025-04-28 DIAGNOSIS — Z48.02 ENCOUNTER FOR REMOVAL OF SUTURES: ICD-10-CM

## 2025-04-28 PROCEDURE — ? SUTURE REMOVAL (GLOBAL PERIOD)

## 2025-04-28 ASSESSMENT — LOCATION SIMPLE DESCRIPTION DERM: LOCATION SIMPLE: RIGHT CHEEK

## 2025-04-28 ASSESSMENT — LOCATION DETAILED DESCRIPTION DERM: LOCATION DETAILED: RIGHT CENTRAL MALAR CHEEK

## 2025-04-28 ASSESSMENT — LOCATION ZONE DERM: LOCATION ZONE: FACE

## 2025-04-28 NOTE — PROCEDURE: SUTURE REMOVAL (GLOBAL PERIOD)
Detail Level: Detailed
Add 66182 Cpt? (Important Note: In 2017 The Use Of 13349 Is Being Tracked By Cms To Determine Future Global Period Reimbursement For Global Periods): no

## 2025-05-06 ENCOUNTER — APPOINTMENT (OUTPATIENT)
Dept: URBAN - METROPOLITAN AREA CLINIC 36 | Facility: CLINIC | Age: 85
Setting detail: DERMATOLOGY
End: 2025-05-06

## 2025-05-06 DIAGNOSIS — Z48.817 ENCOUNTER FOR SURGICAL AFTERCARE FOLLOWING SURGERY ON THE SKIN AND SUBCUTANEOUS TISSUE: ICD-10-CM

## 2025-05-06 PROCEDURE — ? POST-OP WOUND CHECK

## 2025-05-06 PROCEDURE — 99024 POSTOP FOLLOW-UP VISIT: CPT

## 2025-05-06 ASSESSMENT — LOCATION ZONE DERM: LOCATION ZONE: FACE

## 2025-05-06 ASSESSMENT — LOCATION DETAILED DESCRIPTION DERM: LOCATION DETAILED: RIGHT CENTRAL MALAR CHEEK

## 2025-05-06 ASSESSMENT — LOCATION SIMPLE DESCRIPTION DERM: LOCATION SIMPLE: RIGHT CHEEK

## 2025-05-06 NOTE — PROCEDURE: POST-OP WOUND CHECK
Detail Level: Generalized
Add 53320 Cpt? (Important Note: In 2017 The Use Of 69117 Is Being Tracked By Cms To Determine Future Global Period Reimbursement For Global Periods): yes

## 2025-05-22 ENCOUNTER — APPOINTMENT (OUTPATIENT)
Dept: URBAN - METROPOLITAN AREA CLINIC 6 | Facility: CLINIC | Age: 85
Setting detail: DERMATOLOGY
End: 2025-05-22

## 2025-05-22 DIAGNOSIS — Z85.828 PERSONAL HISTORY OF OTHER MALIGNANT NEOPLASM OF SKIN: ICD-10-CM

## 2025-05-22 DIAGNOSIS — Z71.89 OTHER SPECIFIED COUNSELING: ICD-10-CM

## 2025-05-22 DIAGNOSIS — D22 MELANOCYTIC NEVI: ICD-10-CM

## 2025-05-22 DIAGNOSIS — L57.0 ACTINIC KERATOSIS: ICD-10-CM

## 2025-05-22 DIAGNOSIS — L81.4 OTHER MELANIN HYPERPIGMENTATION: ICD-10-CM

## 2025-05-22 DIAGNOSIS — D18.0 HEMANGIOMA: ICD-10-CM

## 2025-05-22 DIAGNOSIS — L82.1 OTHER SEBORRHEIC KERATOSIS: ICD-10-CM

## 2025-05-22 PROBLEM — D22.72 MELANOCYTIC NEVI OF LEFT LOWER LIMB, INCLUDING HIP: Status: ACTIVE | Noted: 2025-05-22

## 2025-05-22 PROBLEM — D22.62 MELANOCYTIC NEVI OF LEFT UPPER LIMB, INCLUDING SHOULDER: Status: ACTIVE | Noted: 2025-05-22

## 2025-05-22 PROBLEM — D22.61 MELANOCYTIC NEVI OF RIGHT UPPER LIMB, INCLUDING SHOULDER: Status: ACTIVE | Noted: 2025-05-22

## 2025-05-22 PROBLEM — D22.71 MELANOCYTIC NEVI OF RIGHT LOWER LIMB, INCLUDING HIP: Status: ACTIVE | Noted: 2025-05-22

## 2025-05-22 PROBLEM — D18.01 HEMANGIOMA OF SKIN AND SUBCUTANEOUS TISSUE: Status: ACTIVE | Noted: 2025-05-22

## 2025-05-22 PROBLEM — D22.5 MELANOCYTIC NEVI OF TRUNK: Status: ACTIVE | Noted: 2025-05-22

## 2025-05-22 PROCEDURE — 99213 OFFICE O/P EST LOW 20 MIN: CPT | Mod: 25

## 2025-05-22 PROCEDURE — ? COUNSELING

## 2025-05-22 PROCEDURE — 17003 DESTRUCT PREMALG LES 2-14: CPT

## 2025-05-22 PROCEDURE — ? BENIGN DESTRUCTION COSMETIC

## 2025-05-22 PROCEDURE — ? LIQUID NITROGEN

## 2025-05-22 PROCEDURE — 17000 DESTRUCT PREMALG LESION: CPT

## 2025-05-22 ASSESSMENT — LOCATION SIMPLE DESCRIPTION DERM
LOCATION SIMPLE: RIGHT UPPER BACK
LOCATION SIMPLE: LEFT UPPER ARM
LOCATION SIMPLE: LEFT POSTERIOR THIGH
LOCATION SIMPLE: RIGHT CHEEK
LOCATION SIMPLE: RIGHT THIGH
LOCATION SIMPLE: LEFT FOREARM
LOCATION SIMPLE: LEFT UPPER BACK
LOCATION SIMPLE: LEFT THIGH
LOCATION SIMPLE: RIGHT FOREARM
LOCATION SIMPLE: LEFT PRETIBIAL REGION
LOCATION SIMPLE: RIGHT CALF
LOCATION SIMPLE: LEFT CHEEK
LOCATION SIMPLE: RIGHT UPPER ARM
LOCATION SIMPLE: NOSE
LOCATION SIMPLE: CHEST
LOCATION SIMPLE: LEFT FOREHEAD
LOCATION SIMPLE: LEFT CALF
LOCATION SIMPLE: LEFT LOWER BACK
LOCATION SIMPLE: RIGHT LOWER BACK
LOCATION SIMPLE: RIGHT POSTERIOR THIGH

## 2025-05-22 ASSESSMENT — LOCATION DETAILED DESCRIPTION DERM
LOCATION DETAILED: RIGHT PROXIMAL DORSAL FOREARM
LOCATION DETAILED: LEFT DISTAL DORSAL FOREARM
LOCATION DETAILED: RIGHT SUPERIOR LATERAL MIDBACK
LOCATION DETAILED: LEFT CENTRAL MALAR CHEEK
LOCATION DETAILED: LEFT VENTRAL DISTAL FOREARM
LOCATION DETAILED: RIGHT PROXIMAL CALF
LOCATION DETAILED: RIGHT MID-UPPER BACK
LOCATION DETAILED: NASAL ROOT
LOCATION DETAILED: RIGHT CENTRAL MALAR CHEEK
LOCATION DETAILED: LEFT ANTERIOR DISTAL UPPER ARM
LOCATION DETAILED: LEFT INFERIOR MEDIAL LOWER BACK
LOCATION DETAILED: LEFT PROXIMAL DORSAL FOREARM
LOCATION DETAILED: RIGHT VENTRAL DISTAL FOREARM
LOCATION DETAILED: RIGHT ANTERIOR PROXIMAL THIGH
LOCATION DETAILED: RIGHT VENTRAL PROXIMAL FOREARM
LOCATION DETAILED: RIGHT MEDIAL INFERIOR CHEST
LOCATION DETAILED: RIGHT DISTAL POSTERIOR THIGH
LOCATION DETAILED: LEFT FOREHEAD
LOCATION DETAILED: NASAL DORSUM
LOCATION DETAILED: LEFT INFERIOR UPPER BACK
LOCATION DETAILED: LEFT PROXIMAL CALF
LOCATION DETAILED: LEFT DISTAL POSTERIOR THIGH
LOCATION DETAILED: LEFT DISTAL PRETIBIAL REGION
LOCATION DETAILED: LEFT MEDIAL SUPERIOR CHEST
LOCATION DETAILED: LEFT ANTERIOR PROXIMAL THIGH
LOCATION DETAILED: RIGHT ANTERIOR DISTAL UPPER ARM

## 2025-05-22 ASSESSMENT — LOCATION ZONE DERM
LOCATION ZONE: TRUNK
LOCATION ZONE: ARM
LOCATION ZONE: LEG
LOCATION ZONE: FACE
LOCATION ZONE: NOSE

## 2025-06-04 ENCOUNTER — APPOINTMENT (OUTPATIENT)
Dept: URBAN - METROPOLITAN AREA CLINIC 36 | Facility: CLINIC | Age: 85
Setting detail: DERMATOLOGY
End: 2025-06-04

## 2025-06-04 DIAGNOSIS — Z48.817 ENCOUNTER FOR SURGICAL AFTERCARE FOLLOWING SURGERY ON THE SKIN AND SUBCUTANEOUS TISSUE: ICD-10-CM

## 2025-06-04 PROCEDURE — ? POST-OP WOUND CHECK

## 2025-06-04 ASSESSMENT — LOCATION ZONE DERM: LOCATION ZONE: FACE

## 2025-06-04 ASSESSMENT — LOCATION DETAILED DESCRIPTION DERM: LOCATION DETAILED: RIGHT CENTRAL MALAR CHEEK

## 2025-06-04 ASSESSMENT — LOCATION SIMPLE DESCRIPTION DERM: LOCATION SIMPLE: RIGHT CHEEK

## 2025-06-04 NOTE — PROCEDURE: POST-OP WOUND CHECK
Detail Level: Generalized
Add 77808 Cpt? (Important Note: In 2017 The Use Of 67027 Is Being Tracked By Cms To Determine Future Global Period Reimbursement For Global Periods): yes

## 2025-07-29 ENCOUNTER — APPOINTMENT (OUTPATIENT)
Dept: URBAN - METROPOLITAN AREA CLINIC 6 | Facility: CLINIC | Age: 85
Setting detail: DERMATOLOGY
End: 2025-07-29

## 2025-07-29 DIAGNOSIS — L98.1 FACTITIAL DERMATITIS: ICD-10-CM

## 2025-07-29 DIAGNOSIS — L82.1 OTHER SEBORRHEIC KERATOSIS: ICD-10-CM

## 2025-07-29 PROCEDURE — ? ADDITIONAL NOTES

## 2025-07-29 PROCEDURE — ? DIAGNOSIS COMMENT

## 2025-07-29 PROCEDURE — ? COUNSELING

## 2025-07-29 ASSESSMENT — LOCATION SIMPLE DESCRIPTION DERM
LOCATION SIMPLE: RIGHT CHEEK
LOCATION SIMPLE: LEFT UPPER BACK
LOCATION SIMPLE: LEFT FOREHEAD

## 2025-07-29 ASSESSMENT — LOCATION DETAILED DESCRIPTION DERM
LOCATION DETAILED: LEFT FOREHEAD
LOCATION DETAILED: LEFT LATERAL UPPER BACK
LOCATION DETAILED: RIGHT CENTRAL MALAR CHEEK

## 2025-07-29 ASSESSMENT — LOCATION ZONE DERM
LOCATION ZONE: FACE
LOCATION ZONE: TRUNK

## 2025-08-27 ENCOUNTER — APPOINTMENT (OUTPATIENT)
Dept: URBAN - METROPOLITAN AREA CLINIC 36 | Facility: CLINIC | Age: 85
Setting detail: DERMATOLOGY
End: 2025-08-27

## 2025-08-27 DIAGNOSIS — Z48.817 ENCOUNTER FOR SURGICAL AFTERCARE FOLLOWING SURGERY ON THE SKIN AND SUBCUTANEOUS TISSUE: ICD-10-CM

## 2025-08-27 PROCEDURE — ? PULSED-DYE LASER

## 2025-08-27 PROCEDURE — ? POST-OP WOUND CHECK

## 2025-08-27 ASSESSMENT — LOCATION DETAILED DESCRIPTION DERM: LOCATION DETAILED: RIGHT CENTRAL MALAR CHEEK

## 2025-08-27 ASSESSMENT — LOCATION SIMPLE DESCRIPTION DERM: LOCATION SIMPLE: RIGHT CHEEK

## 2025-08-27 ASSESSMENT — LOCATION ZONE DERM: LOCATION ZONE: FACE
